# Patient Record
Sex: MALE | Race: WHITE | NOT HISPANIC OR LATINO | Employment: OTHER | URBAN - METROPOLITAN AREA
[De-identification: names, ages, dates, MRNs, and addresses within clinical notes are randomized per-mention and may not be internally consistent; named-entity substitution may affect disease eponyms.]

---

## 2017-05-30 DIAGNOSIS — E78.5 DYSLIPIDEMIA: ICD-10-CM

## 2017-05-30 DIAGNOSIS — I10 ESSENTIAL HYPERTENSION: ICD-10-CM

## 2017-05-30 DIAGNOSIS — I25.10 CORONARY ARTERY DISEASE, ANGINA PRESENCE UNSPECIFIED, UNSPECIFIED VESSEL OR LESION TYPE, UNSPECIFIED WHETHER NATIVE OR TRANSPLANTED HEART: Primary | ICD-10-CM

## 2017-05-30 RX ORDER — EZETIMIBE 10 MG
TABLET ORAL
Qty: 30 TABLET | Refills: 12 | Status: SHIPPED | OUTPATIENT
Start: 2017-05-30 | End: 2017-06-02 | Stop reason: SDUPTHER

## 2017-05-30 NOTE — TELEPHONE ENCOUNTER
----- Message from Poonam Meraz sent at 5/30/2017  9:32 AM CDT -----  Patient spouse is requesting a return call to be advised if patient needs any labs or test prior to his appointment on 06/02/17 contact her at [phone].      Thank you

## 2017-05-30 NOTE — TELEPHONE ENCOUNTER
Spoke with patient's wife. Lab orders entered and pt can come do fasting bloodwork in the morning. She verbalized understanding and thanked us.

## 2017-05-31 ENCOUNTER — LAB VISIT (OUTPATIENT)
Dept: LAB | Facility: HOSPITAL | Age: 67
End: 2017-05-31
Attending: INTERNAL MEDICINE
Payer: MEDICARE

## 2017-05-31 DIAGNOSIS — I25.10 CORONARY ARTERY DISEASE, ANGINA PRESENCE UNSPECIFIED, UNSPECIFIED VESSEL OR LESION TYPE, UNSPECIFIED WHETHER NATIVE OR TRANSPLANTED HEART: ICD-10-CM

## 2017-05-31 DIAGNOSIS — E78.5 DYSLIPIDEMIA: ICD-10-CM

## 2017-05-31 DIAGNOSIS — I10 ESSENTIAL HYPERTENSION: ICD-10-CM

## 2017-05-31 LAB
ALBUMIN SERPL BCP-MCNC: 3.9 G/DL
ALP SERPL-CCNC: 43 U/L
ALT SERPL W/O P-5'-P-CCNC: 35 U/L
ANION GAP SERPL CALC-SCNC: 11 MMOL/L
AST SERPL-CCNC: 29 U/L
BASOPHILS # BLD AUTO: 0.03 K/UL
BASOPHILS NFR BLD: 0.4 %
BILIRUB SERPL-MCNC: 0.6 MG/DL
BUN SERPL-MCNC: 34 MG/DL
CALCIUM SERPL-MCNC: 9.7 MG/DL
CHLORIDE SERPL-SCNC: 106 MMOL/L
CHOLEST/HDLC SERPL: 4.5 {RATIO}
CO2 SERPL-SCNC: 25 MMOL/L
CREAT SERPL-MCNC: 1.5 MG/DL
DIFFERENTIAL METHOD: NORMAL
EOSINOPHIL # BLD AUTO: 0.1 K/UL
EOSINOPHIL NFR BLD: 1.9 %
ERYTHROCYTE [DISTWIDTH] IN BLOOD BY AUTOMATED COUNT: 13.2 %
EST. GFR  (AFRICAN AMERICAN): 54.1 ML/MIN/1.73 M^2
EST. GFR  (NON AFRICAN AMERICAN): 46.8 ML/MIN/1.73 M^2
GLUCOSE SERPL-MCNC: 104 MG/DL
HCT VFR BLD AUTO: 45.2 %
HDL/CHOLESTEROL RATIO: 22.5 %
HDLC SERPL-MCNC: 178 MG/DL
HDLC SERPL-MCNC: 40 MG/DL
HGB BLD-MCNC: 15.4 G/DL
LDLC SERPL CALC-MCNC: 99.8 MG/DL
LYMPHOCYTES # BLD AUTO: 1.7 K/UL
LYMPHOCYTES NFR BLD: 24.2 %
MCH RBC QN AUTO: 30.9 PG
MCHC RBC AUTO-ENTMCNC: 34.1 %
MCV RBC AUTO: 91 FL
MONOCYTES # BLD AUTO: 0.5 K/UL
MONOCYTES NFR BLD: 6.6 %
NEUTROPHILS # BLD AUTO: 4.7 K/UL
NEUTROPHILS NFR BLD: 66.8 %
NONHDLC SERPL-MCNC: 138 MG/DL
PLATELET # BLD AUTO: 202 K/UL
PMV BLD AUTO: 11.8 FL
POTASSIUM SERPL-SCNC: 4.4 MMOL/L
PROT SERPL-MCNC: 7.2 G/DL
RBC # BLD AUTO: 4.98 M/UL
SODIUM SERPL-SCNC: 142 MMOL/L
TRIGL SERPL-MCNC: 191 MG/DL
WBC # BLD AUTO: 7.02 K/UL

## 2017-05-31 PROCEDURE — 85025 COMPLETE CBC W/AUTO DIFF WBC: CPT

## 2017-05-31 PROCEDURE — 80061 LIPID PANEL: CPT

## 2017-05-31 PROCEDURE — 36415 COLL VENOUS BLD VENIPUNCTURE: CPT | Mod: PO

## 2017-05-31 PROCEDURE — 80053 COMPREHEN METABOLIC PANEL: CPT

## 2017-06-02 ENCOUNTER — OFFICE VISIT (OUTPATIENT)
Dept: CARDIOLOGY | Facility: CLINIC | Age: 67
End: 2017-06-02
Payer: MEDICARE

## 2017-06-02 VITALS
HEART RATE: 50 BPM | WEIGHT: 197.31 LBS | HEIGHT: 70 IN | BODY MASS INDEX: 28.25 KG/M2 | DIASTOLIC BLOOD PRESSURE: 80 MMHG | SYSTOLIC BLOOD PRESSURE: 139 MMHG

## 2017-06-02 DIAGNOSIS — I25.10 CORONARY ARTERY DISEASE INVOLVING NATIVE CORONARY ARTERY OF NATIVE HEART WITHOUT ANGINA PECTORIS: Chronic | ICD-10-CM

## 2017-06-02 DIAGNOSIS — R00.1 BRADYCARDIA: Chronic | ICD-10-CM

## 2017-06-02 DIAGNOSIS — I10 ESSENTIAL HYPERTENSION: Chronic | ICD-10-CM

## 2017-06-02 DIAGNOSIS — Z95.5 STATUS POST CORONARY ARTERY STENT PLACEMENT: Primary | Chronic | ICD-10-CM

## 2017-06-02 DIAGNOSIS — E78.5 DYSLIPIDEMIA: ICD-10-CM

## 2017-06-02 PROCEDURE — 99499 UNLISTED E&M SERVICE: CPT | Mod: S$GLB,,, | Performed by: INTERNAL MEDICINE

## 2017-06-02 PROCEDURE — 99214 OFFICE O/P EST MOD 30 MIN: CPT | Mod: S$GLB,,, | Performed by: INTERNAL MEDICINE

## 2017-06-02 PROCEDURE — 1159F MED LIST DOCD IN RCRD: CPT | Mod: S$GLB,,, | Performed by: INTERNAL MEDICINE

## 2017-06-02 PROCEDURE — 1126F AMNT PAIN NOTED NONE PRSNT: CPT | Mod: S$GLB,,, | Performed by: INTERNAL MEDICINE

## 2017-06-02 PROCEDURE — 99999 PR PBB SHADOW E&M-EST. PATIENT-LVL III: CPT | Mod: PBBFAC,,, | Performed by: INTERNAL MEDICINE

## 2017-06-02 RX ORDER — CLOPIDOGREL BISULFATE 75 MG/1
75 TABLET ORAL DAILY
Qty: 90 TABLET | Refills: 5 | Status: SHIPPED | OUTPATIENT
Start: 2017-06-02 | End: 2018-06-29 | Stop reason: SDUPTHER

## 2017-06-02 RX ORDER — PRAVASTATIN SODIUM 20 MG/1
20 TABLET ORAL NIGHTLY
Qty: 90 TABLET | Refills: 4 | Status: SHIPPED | OUTPATIENT
Start: 2017-06-02 | End: 2018-10-25 | Stop reason: SDUPTHER

## 2017-06-02 RX ORDER — AMLODIPINE BESYLATE 5 MG/1
5 TABLET ORAL NIGHTLY
Qty: 90 TABLET | Refills: 4 | Status: SHIPPED | OUTPATIENT
Start: 2017-06-02 | End: 2018-08-02 | Stop reason: SDUPTHER

## 2017-06-02 RX ORDER — EZETIMIBE 10 MG/1
10 TABLET ORAL DAILY
Qty: 90 TABLET | Refills: 5 | Status: SHIPPED | OUTPATIENT
Start: 2017-06-02 | End: 2018-10-25 | Stop reason: SDUPTHER

## 2017-06-02 RX ORDER — LISINOPRIL AND HYDROCHLOROTHIAZIDE 12.5; 2 MG/1; MG/1
1 TABLET ORAL EVERY MORNING
Qty: 90 TABLET | Refills: 5 | Status: SHIPPED | OUTPATIENT
Start: 2017-06-02 | End: 2018-01-26

## 2017-06-02 NOTE — PROGRESS NOTES
Subjective:    Patient ID:  Alfonso Ewing is a 67 y.o. male who presents for follow-up of No chief complaint on file.      HPI Here for follow up of CAD-PCI (2011). Patients states is doing well no chest pain, SOB or change in exertional tolerence. Patient does not exercise but remains very active with out change in exertional tolerance or chest pain.       Review of Systems   Constitution: Negative for decreased appetite and malaise/fatigue.   HENT: Negative for congestion and nosebleeds.    Eyes: Negative for blurred vision.   Cardiovascular: Negative for chest pain, claudication, cyanosis, dyspnea on exertion, irregular heartbeat, leg swelling, near-syncope, orthopnea, palpitations, paroxysmal nocturnal dyspnea and syncope.   Respiratory: Negative for cough and shortness of breath.    Endocrine: Negative for polyuria.   Hematologic/Lymphatic: Does not bruise/bleed easily.   Musculoskeletal: Negative for back pain, falls, joint pain, joint swelling, muscle cramps, muscle weakness and myalgias.   Gastrointestinal: Negative for bloating, abdominal pain, change in bowel habit, nausea and vomiting.   Genitourinary: Negative for urgency.   Neurological: Negative for dizziness, focal weakness and light-headedness.   Psychiatric/Behavioral: Negative for altered mental status.        Objective:    Physical Exam   Constitutional: He is oriented to person, place, and time. He appears well-developed and well-nourished. He is cooperative.   HENT:   Head: Normocephalic and atraumatic.   Eyes: Conjunctivae are normal. Right eye exhibits no exudate. Left eye exhibits no exudate.   Neck: Normal range of motion. Neck supple. Normal carotid pulses and no JVD present. Carotid bruit is not present. No thyromegaly present.   Cardiovascular: Normal rate, regular rhythm, normal heart sounds and intact distal pulses.    Pulses:       Carotid pulses are 2+ on the right side, and 2+ on the left side.       Radial pulses are 2+ on the right  side, and 2+ on the left side.        Dorsalis pedis pulses are 2+ on the right side, and 2+ on the left side.        Posterior tibial pulses are 2+ on the right side, and 2+ on the left side.   Pulmonary/Chest: Effort normal and breath sounds normal.   Abdominal: Soft. Bowel sounds are normal.   Musculoskeletal: Normal range of motion. He exhibits no edema.   Neurological: He is alert and oriented to person, place, and time. Gait normal.   Skin: Skin is warm, dry and intact. No cyanosis. Nails show no clubbing.   Psychiatric: He has a normal mood and affect. His speech is normal and behavior is normal. Judgment and thought content normal.             ..    Chemistry        Component Value Date/Time     05/31/2017 0736    K 4.4 05/31/2017 0736     05/31/2017 0736    CO2 25 05/31/2017 0736    BUN 34 (H) 05/31/2017 0736    CREATININE 1.5 (H) 05/31/2017 0736     05/31/2017 0736        Component Value Date/Time    CALCIUM 9.7 05/31/2017 0736    ALKPHOS 43 (L) 05/31/2017 0736    AST 29 05/31/2017 0736    ALT 35 05/31/2017 0736    BILITOT 0.6 05/31/2017 0736            ..  Lab Results   Component Value Date    CHOL 178 05/31/2017    CHOL 165 10/20/2016    CHOL 164 10/01/2015     Lab Results   Component Value Date    HDL 40 05/31/2017    HDL 36 (L) 10/20/2016    HDL 36 (L) 10/01/2015     Lab Results   Component Value Date    LDLCALC 99.8 05/31/2017    LDLCALC 75.8 10/20/2016    LDLCALC 85.0 10/01/2015     Lab Results   Component Value Date    TRIG 191 (H) 05/31/2017    TRIG 266 (H) 10/20/2016    TRIG 215 (H) 10/01/2015     Lab Results   Component Value Date    CHOLHDL 22.5 05/31/2017    CHOLHDL 21.8 10/20/2016    CHOLHDL 22.0 10/01/2015     ..  Lab Results   Component Value Date    WBC 7.02 05/31/2017    HGB 15.4 05/31/2017    HCT 45.2 05/31/2017    MCV 91 05/31/2017     05/31/2017       Test(s) Reviewed  I have reviewed the following in detail:  [] Stress test   [] Angiography   [] Echocardiogram    [x] Labs   [] Other:       Assessment:         ICD-10-CM ICD-9-CM   1. Status post coronary artery stent placement Z95.5 V45.82   2. Bradycardia R00.1 427.89   3. Dyslipidemia E78.5 272.4   4. Essential hypertension I10 401.9   5. Coronary artery disease involving native coronary artery of native heart without angina pectoris I25.10 414.01     Problem List Items Addressed This Visit     Bradycardia (Chronic)    CAD (coronary artery disease) (Chronic)    Overview     07/11/2011 mid LAD, 80% stenosis; mid LCX, 60% stenosis; mid RCA, 80%        stenosis;                                                                    11/14/2000 LAD, luminal irregularities; LCX, luminal irregularities;         RCA, occluded;                                                               Previous PCI:                                                                S/P coated stent to LAD, 07/29/2011                                          S/P coated stent to RCA, 07/29/2011                                          S/P stent to RCA, 11/14/2000            Dyslipidemia    Overview     hx increased LFT while on niaspan an  vytorin. Myalgia with lipitor, zocor.         Essential hypertension (Chronic)    Status post coronary artery stent placement - Primary (Chronic)    Overview                                          Previous PCI:                                                                S/P coated stent to LAD, 07/29/2011                                          S/P coated stent to RCA, 07/29/2011                                          S/P stent to RCA, 11/14/2000                 Other Visit Diagnoses    None.          Plan:           Return to clinic 9 months   Low level/low impact aerobic exercise 5x's/wk. Heart healthy diet and risk factor modification.    See labs and med orders.  Myalgias imroved statins better not at goal.   TM nuc next visit eval chronotropic response

## 2017-07-13 RX ORDER — NITROGLYCERIN 0.4 MG/1
0.4 TABLET SUBLINGUAL EVERY 5 MIN PRN
Qty: 25 TABLET | Refills: 12 | Status: SHIPPED | OUTPATIENT
Start: 2017-07-13 | End: 2019-08-02 | Stop reason: SDUPTHER

## 2017-07-13 NOTE — TELEPHONE ENCOUNTER
----- Message from Patricia Sanders sent at 7/13/2017  1:46 PM CDT -----  Contact: Crystal SanzSpouse  nitroGLYCERIN (NITROSTAT) 0.4 MG SL tablet  .  [facility] [employer] [id number] [place] [address] & [employer] [address] [place] [id number]  Phone: [phone] Fax: [phone]

## 2017-08-05 RX ORDER — AMLODIPINE BESYLATE 5 MG/1
TABLET ORAL
Qty: 90 TABLET | Refills: 0 | Status: SHIPPED | OUTPATIENT
Start: 2017-08-05 | End: 2017-12-01 | Stop reason: SDUPTHER

## 2017-08-28 RX ORDER — PRAVASTATIN SODIUM 20 MG/1
TABLET ORAL
Qty: 90 TABLET | Refills: 4 | Status: SHIPPED | OUTPATIENT
Start: 2017-08-28 | End: 2017-12-01 | Stop reason: SDUPTHER

## 2017-08-29 RX ORDER — FINASTERIDE 5 MG/1
TABLET, FILM COATED ORAL
Qty: 90 TABLET | Refills: 3 | Status: SHIPPED | OUTPATIENT
Start: 2017-08-29 | End: 2018-09-27 | Stop reason: SDUPTHER

## 2017-09-21 ENCOUNTER — PATIENT OUTREACH (OUTPATIENT)
Dept: ADMINISTRATIVE | Facility: HOSPITAL | Age: 67
End: 2017-09-21

## 2017-09-21 NOTE — LETTER
September 21, 2017    Alfonso Ewing  [address]  [facility] [address]             [facility]  [address]  [facility] [address]  Phone: [phone] Dear  Gildardo:    [facility] is committed to your overall health and would like to ensure that you are up to date on all of your health maintenance testing.  Your insurance company ([employer]) has informed us that you have Dr. Raza listed as your primary care provider.    If Dr. Raza is not your primary care provider, please contact your insurance company and have them update their records with the correct provider.     Otherwise,  Please schedule an appointment with Dr. Raza by calling [phone] at your earliest convenience.    You may also be due for a One-time Hepatitis C Screening lab test(a viral condition that can harm the liver), Tetanus immunization, Shingles immunization, Pneumonia immunization, and Influenza vaccine        If you have any questions or concerns, please don't hesitate to call.    Sincerely,    Michaelle Courtney  Clinical Care Coordinator  [facility] [address] [facility] [address]  [place] [address]  Phone: [phone]   Fax: [phone]

## 2017-09-21 NOTE — PROGRESS NOTES
Jose weiss over 12 month report.  Has Dr. Raza as PCP.  Needs to establish care or notify insurance correct pcp.  Letter mailed.

## 2017-09-29 RX ORDER — CLOPIDOGREL BISULFATE 75 MG/1
TABLET ORAL
Qty: 90 TABLET | Refills: 0 | Status: SHIPPED | OUTPATIENT
Start: 2017-09-29 | End: 2017-12-01 | Stop reason: SDUPTHER

## 2017-10-07 RX ORDER — LISINOPRIL AND HYDROCHLOROTHIAZIDE 12.5; 2 MG/1; MG/1
1 TABLET ORAL EVERY MORNING
Qty: 90 TABLET | Refills: 4 | Status: SHIPPED | OUTPATIENT
Start: 2017-10-07 | End: 2017-12-01 | Stop reason: SDUPTHER

## 2017-10-31 ENCOUNTER — OFFICE VISIT (OUTPATIENT)
Dept: PRIMARY CARE CLINIC | Facility: CLINIC | Age: 67
End: 2017-10-31
Payer: MEDICARE

## 2017-10-31 ENCOUNTER — HOSPITAL ENCOUNTER (OUTPATIENT)
Dept: RADIOLOGY | Facility: HOSPITAL | Age: 67
Discharge: HOME | End: 2017-10-31
Attending: NURSE PRACTITIONER
Payer: MEDICARE

## 2017-10-31 VITALS
HEIGHT: 70 IN | WEIGHT: 197.06 LBS | DIASTOLIC BLOOD PRESSURE: 84 MMHG | SYSTOLIC BLOOD PRESSURE: 136 MMHG | HEART RATE: 85 BPM | TEMPERATURE: 99 F | OXYGEN SATURATION: 97 % | BODY MASS INDEX: 28.21 KG/M2

## 2017-10-31 DIAGNOSIS — M25.561 ACUTE PAIN OF RIGHT KNEE: ICD-10-CM

## 2017-10-31 DIAGNOSIS — T14.8XXA BRUISING: ICD-10-CM

## 2017-10-31 DIAGNOSIS — M25.461 EFFUSION OF RIGHT KNEE: ICD-10-CM

## 2017-10-31 DIAGNOSIS — M25.561 ACUTE PAIN OF RIGHT KNEE: Primary | ICD-10-CM

## 2017-10-31 PROCEDURE — 73562 X-RAY EXAM OF KNEE 3: CPT | Mod: 26,RT,, | Performed by: RADIOLOGY

## 2017-10-31 PROCEDURE — 73560 X-RAY EXAM OF KNEE 1 OR 2: CPT | Mod: 26,59,LT, | Performed by: RADIOLOGY

## 2017-10-31 PROCEDURE — 99214 OFFICE O/P EST MOD 30 MIN: CPT | Mod: S$GLB,,, | Performed by: NURSE PRACTITIONER

## 2017-10-31 PROCEDURE — 73560 X-RAY EXAM OF KNEE 1 OR 2: CPT | Mod: TC,LT,59,PO

## 2017-10-31 PROCEDURE — 99999 PR PBB SHADOW E&M-EST. PATIENT-LVL IV: CPT | Mod: PBBFAC,,, | Performed by: NURSE PRACTITIONER

## 2017-10-31 NOTE — PATIENT INSTRUCTIONS
RICE     Rest an injury, elevate it, and use ice and compression as directed.   RICE stands for rest, ice, compression, and elevation. These can limit pain and swelling after an injury. RICE may be recommended to help treat fractures, sprains, strains, and bruises or bumps.   Home care  The following explain the details of RICE:  · Rest. Limit the use of the injured body part. This helps prevent further damage to the body part and gives it time to heal. In some cases, you may need a sling, brace, splint, or cast to help keep the body part still until it has healed.  · Ice. Applying ice right after an injury helps relieve pain and swelling. Wrap a bag of ice in a thin towel. Then, place it over the injured area. Do this for 10 to 15 minutes every 3 to 4 hours. Continue for the next 1 to 3 days or until your symptoms improve. Never put ice directly on your skin or ice an area longer than 15 minutes at a time.  · Compression. Putting pressure on an injury helps reduce swelling and provides support. Wrap the injured area firmly with an elastic bandage/wrap. Make sure not to wrap the bandage too tightly or you will cut off blood flow to the injured area. If your bandage loosens, rewrap it.  · Elevation. Keeping an injury raised above the level of your heart reduces swelling, pain, and throbbing. For instance, if you have a broken leg, it may help to rest your leg on several pillows when sitting or lying down. Try to keep the injured area elevated for at least 2 to 3 hours per day.  Follow-up care  Follow up with your healthcare provider, or as advised.  When to seek medical advice  Call your healthcare provider right away if any of these occur:  · Fever of 100.4°F (38°C) or higher, or as directed by your healthcare provider  · Increased pain or swelling in the injured body part  · Injured body part becomes cold, blue, numb, or tingly  · Signs of infection. These include warmth in the skin, redness, drainage, or bad  smell coming from the injured body part.  Date Last Reviewed: 1/18/2016  © 2158-0387 [employer]. [address] [place] [address], [place] [address]. All rights reserved. This information is not intended as a substitute for professional medical care. Always follow your healthcare professional's instructions.

## 2017-11-01 NOTE — PROGRESS NOTES
Please let the patient know that there is some fluid in the knee and some soft tissue injury. I put in a referral for ortho and I want him to be seen soon. He also needs to be set up with an appointment with Dr. Raza. Thanks.

## 2017-11-02 ENCOUNTER — OFFICE VISIT (OUTPATIENT)
Dept: ORTHOPEDICS | Facility: CLINIC | Age: 67
End: 2017-11-02
Payer: MEDICARE

## 2017-11-02 VITALS — HEIGHT: 70 IN | WEIGHT: 197 LBS | BODY MASS INDEX: 28.2 KG/M2

## 2017-11-02 DIAGNOSIS — S83.241A ACUTE MEDIAL MENISCUS TEAR OF RIGHT KNEE, INITIAL ENCOUNTER: Primary | ICD-10-CM

## 2017-11-02 PROCEDURE — 99999 PR PBB SHADOW E&M-EST. PATIENT-LVL II: CPT | Mod: PBBFAC,,, | Performed by: ORTHOPAEDIC SURGERY

## 2017-11-02 PROCEDURE — 99203 OFFICE O/P NEW LOW 30 MIN: CPT | Mod: S$GLB,,, | Performed by: ORTHOPAEDIC SURGERY

## 2017-11-02 NOTE — PROGRESS NOTES
DATE: 11/2/2017  PATIENT: Alfonso Ewing  REFERRING MD:  CHIEF COMPLAINT:   Chief Complaint   Patient presents with    Right Knee - Pain       HISTORY:  Alfonso Ewing is a 67 y.o. male  who presents for initial evaluation of right knee injury.  States he was well 4 days ago when he was loading hogs onto a truck.  He developed pain.  He noted swelling and bruising but denies any michael injury.  He had difficulty ambulating.  He states over the last 2 or 3 days the pain started to improve and is been able to weight-bear better.  Pain is rated at 4/10.  He denies any previous injuries to his knee.  He is using a cane for ambulation.      PAST MEDICAL/SURGICAL HISTORY:  Past Medical History:   Diagnosis Date    Allergy     Anticoagulant long-term use     ASA 81 mg, Plavix    BPH (benign prostatic hyperplasia)     BPH (benign prostatic hypertrophy)     Bradycardia     chronic since 2012    Cataract     ou done//    Coronary artery disease     2 stents    Dyspnea on exertion     stable, chronic    Gout     HEARING LOSS     no hearing aides anymore    History of myocardial infarction 2000    Hypertension     Nephrolithiasis 1980    passed it on his own    Otitis media     Sleep apnea     Hx of sleep apnea, does not use CPAP anymore, feels it has improved     Past Surgical History:   Procedure Laterality Date    CARDIAC CATHETERIZATION      2 stents    CATARACT EXTRACTION W/  INTRAOCULAR LENS IMPLANT Right 11/24/2015    By Dr Byrd    CATARACT EXTRACTION W/  INTRAOCULAR LENS IMPLANT Left 1/12/2016    [facility]    COLONOSCOPY      CORONARY STENT PLACEMENT      x 2       Current Medications:   Current Outpatient Prescriptions:     amlodipine (NORVASC) 5 MG tablet, Take 1 tablet (5 mg total) by mouth every evening., Disp: 90 tablet, Rfl: 4    amlodipine (NORVASC) 5 MG tablet, TAKE 1 TABLET(5 MG) BY MOUTH EVERY EVENING, Disp: 90 tablet, Rfl: 0    aspirin (ECOTRIN) 81 MG EC tablet, Take 81 mg by mouth every  evening. , Disp: , Rfl:     clopidogrel (PLAVIX) 75 mg tablet, Take 1 tablet (75 mg total) by mouth once daily., Disp: 90 tablet, Rfl: 5    clopidogrel (PLAVIX) 75 mg tablet, TAKE 1 TABLET BY MOUTH EVERY DAY, Disp: 90 tablet, Rfl: 0    colchicine 0.6 mg tablet, 1 tab now and 1 at bedtime then 1 daily. (Patient taking differently: Take 0.6 mg by mouth daily as needed. ), Disp: 30 tablet, Rfl: 5    ezetimibe (ZETIA) 10 mg tablet, Take 1 tablet (10 mg total) by mouth once daily., Disp: 90 tablet, Rfl: 5    finasteride (PROSCAR) 5 mg tablet, TAKE 1 TABLET BY MOUTH EVERY DAY, Disp: 90 tablet, Rfl: 3    lisinopril-hydrochlorothiazide (PRINZIDE,ZESTORETIC) 20-12.5 mg per tablet, Take 1 tablet by mouth every morning., Disp: 90 tablet, Rfl: 5    lisinopril-hydrochlorothiazide (PRINZIDE,ZESTORETIC) 20-12.5 mg per tablet, TAKE 1 TABLET BY MOUTH EVERY MORNING, Disp: 90 tablet, Rfl: 4    nitroGLYCERIN (NITROSTAT) 0.4 MG SL tablet, Place 1 tablet (0.4 mg total) under the tongue every 5 (five) minutes as needed for Chest pain. 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 Negative for unexplained weight loss.  HENT: Negative for headaches and blurry vision.   Cardiovascular: Negative for chest pain, irregular heartbeat, leg swelling and palpitations.   Respiratory: Negative for cough and shortness of breath.   Gastrointestinal: Negative for abdominal pain, heartburn, nausea and vomiting.   Genitourinary: Negative for bladder incontinence and dysuria.   Musculoskeletal: Negative for systemic arthritis, muscle weakness and myalgias.   Neurological: Negative for numbness.   Psychiatric/Behavioral: Negative for depression.  Endocrine: Negative for polyuria.   Hematologic/Lymphatic: Negative for bleeding disorders.   Skin: Negative for poor wound healing.        PHYSICAL EXAM:  Ht 5' 10" (1.778 m)   Wt 89.4 kg (197 lb)   BMI 28.27 kg/m²   Alfonso Ewing is a well developed, well nourished male in no acute distress. Physical examination of the left knee evaluated the following:    Gait and Alignment  Inspection for ecchymosis, swelling, atrophy, or deformity  Inspection for intra-articular and/or bursal effusions  Tenderness to palpation over the bony and soft tissue structures around the knee  Range of Motion and presence of extensor lag/contractures  Sensation and motor strength  Varus/valgus or anterior/posterior/rotatory instability  Flexion pinch and Huong's Tests  Patellar alignment/tracking/pain to palpation  Vascular exam to include skin temperature/color/capillary refill    Remarkable findings included:  Ecchymosis seen about the medial knee.  Trace effusion.  Range of motion 10° lacking full extension to 120° of flexion.  There is pain at the extreme of extension.  There appears to be a mechanical block.  There is mild medial joint line tenderness.  No lateral tenderness.  No instability on exam.  Flexion pinch is painful.  Huong's test nonspecific.        IMAGING:   The patient's knee radiographs " were personally reviewed and compared to the radiologist's report. No acute fractures or dislocations are seen. The medial and lateral compartments are well preserved without degenerative changes or malalignment. The patellofemoral joint is also without degenerative changes or malalignment. No bony or soft tissue lesions are seen. No loose bodies or calcifications are present.         ASSESSMENT:   Contusion versus medial meniscus tear right knee    PLAN:  The nature of the diagnosis, using models and diagrams when appropriate, was explained to the patient and his wife in detail.  I recommended gentle range of motion exercises and observation versus MRI to rule out a displaced medial meniscus tear.. All questions answered and the patient wishes to observe his symptoms for the next week.  I've recommended gentle exercises.  I will see him back.  Monday for reexamination.  Should he continue to have loss of extension, MRI will be considered to rule out a medial meniscus tear.    This note was dictated using voice recognition software. Please excuse any grammatical or typographical errors.

## 2017-11-02 NOTE — LETTER
November 2, 2017      Smitha Alex NP  [address] [facility] [address] [place] [address]           [facility]  [address] [facility] [address] [place] [address]  Phone: [phone]          Patient: Alfonso Ewing   MR Number: [id number]   YOB: 1950   Date of Visit: 11/2/2017       Dear Smitha Alex:    Thank you for referring Alfonso Ewing to me for evaluation. Attached you will find relevant portions of my assessment and plan of care.    If you have questions, please do not hesitate to call me. I look forward to following Alfonso Ewing along with you.    Sincerely,    Froylan Castle MD    Enclosure  CC:  No Recipients    If you would like to receive this communication electronically, please contact [email or web address] or [phone] to request more information on [employer] Link access.    For providers and/or their staff who would like to refer a patient to [facility], please contact us through our one-stop-shop provider referral line, [facility], at [phone].    If you feel you have received this communication in error or would no longer like to receive these types of communications, please e-mail [email or web address]

## 2017-11-09 ENCOUNTER — LAB VISIT (OUTPATIENT)
Dept: LAB | Facility: HOSPITAL | Age: 67
End: 2017-11-09
Attending: NURSE PRACTITIONER
Payer: MEDICARE

## 2017-11-09 ENCOUNTER — OFFICE VISIT (OUTPATIENT)
Dept: PRIMARY CARE CLINIC | Facility: CLINIC | Age: 67
End: 2017-11-09
Payer: MEDICARE

## 2017-11-09 VITALS
OXYGEN SATURATION: 98 % | SYSTOLIC BLOOD PRESSURE: 138 MMHG | TEMPERATURE: 98 F | BODY MASS INDEX: 28.53 KG/M2 | HEART RATE: 50 BPM | HEIGHT: 70 IN | WEIGHT: 199.31 LBS | DIASTOLIC BLOOD PRESSURE: 88 MMHG

## 2017-11-09 DIAGNOSIS — M79.674 PAIN IN RIGHT TOE(S): Primary | ICD-10-CM

## 2017-11-09 DIAGNOSIS — M79.674 PAIN IN RIGHT TOE(S): ICD-10-CM

## 2017-11-09 LAB — URATE SERPL-MCNC: 7 MG/DL

## 2017-11-09 PROCEDURE — 99999 PR PBB SHADOW E&M-EST. PATIENT-LVL III: CPT | Mod: PBBFAC,,, | Performed by: NURSE PRACTITIONER

## 2017-11-09 PROCEDURE — 36415 COLL VENOUS BLD VENIPUNCTURE: CPT | Mod: PO

## 2017-11-09 PROCEDURE — 84550 ASSAY OF BLOOD/URIC ACID: CPT | Mod: PO

## 2017-11-09 PROCEDURE — 99213 OFFICE O/P EST LOW 20 MIN: CPT | Mod: S$GLB,,, | Performed by: NURSE PRACTITIONER

## 2017-11-09 RX ORDER — COLCHICINE 0.6 MG/1
TABLET ORAL
Qty: 30 TABLET | Refills: 2 | Status: SHIPPED | OUTPATIENT
Start: 2017-11-09 | End: 2022-02-17 | Stop reason: SDUPTHER

## 2017-11-09 NOTE — PATIENT INSTRUCTIONS
Gout    Gout is an inflammation of a joint due to a build-up of gout crystals in the joint fluid. This occurs when there is an excess of uric acid (a normal waste product) in the body. Uric acid builds up in the body when the kidneys are unable to filter enough of it from the blood. This may occur with age. It is also associated with kidney disease. Gout occurs more often in people with obesity, diabetes, high blood pressure, or high levels of fats in the blood. It may run in families. Gout tends to come and go. A flare up of gout is called an attack. Drinking alcohol or eating certain foods (such as shellfish or foods with additives such as high-fructose corn syrup) may increase uric acid levels in the blood and cause a gout attack.  During a gout attack, the affected joint may become a hot, red, swollen and painful. If you have had one attack of gout, you are likely to have another. An attack of gout can be treated with medicine. If these attacks become frequent, a daily medicine may be prescribed to help the kidneys remove uric acid from the body.  Home care  During a gout attack:  · Rest painful joints. If gout affects the joints of your foot or leg, you may want to use crutches for the first few days to keep from bearing weight on the affected joint.  · When sitting or lying down, raise the painful joint to a level higher than your heart.  · Apply an ice pack (ice cubes in a plastic bag wrapped in a thin towel) over the injured area for 20 minutes every 1 to 2 hours the first day for pain relief. Continue this 3 to 4 times a day for swelling and pain.  · Avoid alcohol and foods listed below (see Preventing attacks) during a gout attack. Drink extra fluid to help flush the uric acid through your kidneys.  · If you were prescribed a medicine to treat gout, take it as your healthcare provider has instructed. Don't skip doses.  · Take anti-inflammatory medicine as directed.   · If pain medicines have been  prescribed, take them exactly as directed.    Preventing attacks  · Minimize or avoid alcohol use. Excess alcohol intake can cause a gout attack.  · Limit these foods and beverages:  ¨ Organ meats, such as kidneys and liver  ¨ Certain seafoods (anchovies, sardines, shrimp, scallops, herring, mackerel)  ¨ Wild game, meat extracts and meat gravies  ¨ Foods and beverages sweetened with high-fructose corn syrup, such as sodas  · Eat a healthy diet including low-fat and nonfat dairy, whole grains, and vegetables.  · If you are overweight, talk to your healthcare provider about a weight reduction plan. Avoid fasting or extreme low calorie diets (less than 900 calories per day). This will increase uric acid levels in the body.  · If you have diabetes or high blood pressure, work with your doctor to manage these conditions.  · Protect the joint from injury. Trauma can trigger a gout attack.  Follow-up care  Follow up with your healthcare provider, or as advised.   When to seek medical advice  Call your healthcare provider if you have any of the following:  · Fever over 100.4°F (38.ºC) with worsening joint pain  · Increasing redness around the joint  · Pain developing in another joint  · Repeated vomiting, abdominal pain, or blood in the vomit or stool (black or red color)  Date Last Reviewed: 3/1/2017  © 1647-5170 [employer]. [address] [place] [address], [place] [address]. All rights reserved. This information is not intended as a substitute for professional medical care. Always follow your healthcare professional's instructions.        Treating Gout Attacks     Raising the joint above the level of your heart can help reduce gout symptoms.     Gout is a disease that affects the joints. It is caused by excess uric acid in your blood stream that may lead to crystals forming in your joints. Left untreated, it can lead to painful foot and joint deformities and even kidney problems. But, by treating gout early, you can  relieve pain and help prevent future problems. Gout can usually be treated with medication and proper diet. In severe cases, surgery may be needed.  Gout attacks are painful and often happen more than once. Taking medications may reduce pain and prevent attacks in the future. There are also some things you can do at home to relieve symptoms.  Medications for gout  Your healthcare provider may prescribe a daily medication to reduce levels of uric acid. Reducing your uric acid levels may help prevent gout attacks. Allopurinol is one commonly used medication taken daily to reduce uric acid levels. Other medications can help relieve pain and swelling during an acute attack. Medicines such as NSAIDs (nonsteroidal anti-inflammatory medicines), steroids, and colchicine may be prescribed for intermittent use to relieve an acute gout attack. Be sure to take your medication as directed.  What you can do  Below are some things you can do at home to relieve gout symptoms. Your healthcare provider may have other tips.  · Rest the painful joint as much as you can.  · Raise the painful joint so it is at a level higher than your heart.  · Use ice for 10 minutes every 1-2 hours as possible.  How can I prevent gout?  With a little effort, you may be able to prevent gout attacks in the future. Here are some things you can do:  · Avoid foods high in purines  ¨ Certain meats (red meat, processed meat, turkey)  ¨ Organ meats (kidney, liver, sweetbread)  ¨ Shellfish (lobster, crab, shrimp, scallop, mussel)  ¨ Certain fish (anchovy, sardine, herring, mackerel)  · Take any medications prescribed by your healthcare provider.  · Lose weight if you need to.  · Reduce high fructose corn syrup in meals and drinks.  · Reduce or eliminate consumption of alcohol, particularly beer, but also red wine and spirits.  · Control blood pressure, diabetes, and cholesterol.  · Drink plenty of water to help flush uric acid from your body.  Date Last  Reviewed: 2/1/2016  © 1010-3754 [employer]. [address] [place] [address], [place] [address]. All rights reserved. This information is not intended as a substitute for professional medical care. Always follow your healthcare professional's instructions.      If you are not better in 3 days, if worse or you have concerns or questions, please do not hesitate to call.  You can reach us at [phone] Monday through Friday (except holidays) 12 nooon to 8 p.m.    Thank you for using the [facility]!    JENNIFER Holloway, [facility]-BC  [facility]  [facility]

## 2017-11-09 NOTE — PROGRESS NOTES
Subjective:       Patient ID: Alfonso Ewing is a 67 y.o. male.    Chief Complaint: Foot Pain (right- Tylenol taken once a day)    Patient who is known to me presents with c/o right toe pain. He has had similar symptoms in the past and recently ate some red meat. He tried to use black cherry concentrate to help with symptoms.       Foot Pain   This is a new problem. Episode onset: 4-5 days ago. The problem occurs daily. The problem has been gradually worsening. Associated symptoms include arthralgias and joint swelling. Pertinent negatives include no abdominal pain, chest pain, chills, congestion, coughing, diaphoresis, fatigue, fever, headaches, numbness, rash, sore throat, swollen glands, urinary symptoms, vertigo, visual change, vomiting or weakness. The symptoms are aggravated by standing and walking (touching the toe). He has tried immobilization, position changes, acetaminophen and drinking for the symptoms. The treatment provided mild relief.     Review of Systems   Constitutional: Negative for chills, diaphoresis, fatigue and fever.   HENT: Negative for congestion, ear pain, sinus pressure and sore throat.    Respiratory: Negative for cough, shortness of breath and wheezing.    Cardiovascular: Negative for chest pain and leg swelling.   Gastrointestinal: Negative for abdominal distention, abdominal pain and vomiting.   Genitourinary: Negative for dysuria and flank pain.   Musculoskeletal: Positive for arthralgias, gait problem and joint swelling.   Skin: Positive for color change. Negative for pallor and rash.   Neurological: Negative for vertigo, weakness, light-headedness, numbness and headaches.       Objective:      Physical Exam   Constitutional: He is oriented to person, place, and time. He appears well-developed and well-nourished.   HENT:   Head: Normocephalic and atraumatic.   Right Ear: External ear normal.   Left Ear: External ear normal.   Eyes: Conjunctivae and EOM are normal. Pupils are equal,  round, and reactive to light.   Neck: Normal range of motion. Neck supple.   Cardiovascular: Normal rate and regular rhythm.    Pulmonary/Chest: Effort normal and breath sounds normal.   Abdominal: Soft. Bowel sounds are normal.   Musculoskeletal:        Right foot: There is decreased range of motion, tenderness and swelling. There is normal capillary refill, no crepitus and no laceration.        Feet:    Neurological: He is alert and oriented to person, place, and time.   Skin: Skin is warm and dry.   Nursing note and vitals reviewed.      Assessment:       1. Pain in right toe(s)        Plan:       Pain in right toe(s)  -     URIC ACID; Future; Expected date: 11/23/2017  -     colchicine 0.6 mg tablet; 1 tab now and 1 at bedtime, then 1 tab daily.  Dispense: 30 tablet; Refill: 2    Patient instructed to follow up in 1 week if no improvement in symptoms.

## 2017-11-09 NOTE — PROGRESS NOTES
Please call patient and let him know that he should take the colchicine as the level is on the high end of normal and since the symptoms are similar to his last gout flare. If he is not better in a week after taking the medication he should give my a call. Thanks.

## 2017-12-01 ENCOUNTER — OFFICE VISIT (OUTPATIENT)
Dept: FAMILY MEDICINE | Facility: CLINIC | Age: 67
End: 2017-12-01
Payer: MEDICARE

## 2017-12-01 ENCOUNTER — TELEPHONE (OUTPATIENT)
Dept: CARDIOLOGY | Facility: CLINIC | Age: 67
End: 2017-12-01

## 2017-12-01 VITALS
HEART RATE: 49 BPM | SYSTOLIC BLOOD PRESSURE: 145 MMHG | HEIGHT: 70 IN | DIASTOLIC BLOOD PRESSURE: 79 MMHG | WEIGHT: 193.31 LBS | BODY MASS INDEX: 27.67 KG/M2

## 2017-12-01 DIAGNOSIS — Z00.00 ENCOUNTER FOR PREVENTIVE HEALTH EXAMINATION: ICD-10-CM

## 2017-12-01 DIAGNOSIS — Z95.5 STATUS POST CORONARY ARTERY STENT PLACEMENT: Chronic | ICD-10-CM

## 2017-12-01 DIAGNOSIS — Z12.11 ENCOUNTER FOR SCREENING FOR MALIGNANT NEOPLASM OF COLON: Primary | ICD-10-CM

## 2017-12-01 DIAGNOSIS — I25.10 CORONARY ARTERY DISEASE INVOLVING NATIVE CORONARY ARTERY OF NATIVE HEART WITHOUT ANGINA PECTORIS: Chronic | ICD-10-CM

## 2017-12-01 DIAGNOSIS — E78.5 DYSLIPIDEMIA: ICD-10-CM

## 2017-12-01 DIAGNOSIS — I10 ESSENTIAL HYPERTENSION: Chronic | ICD-10-CM

## 2017-12-01 PROCEDURE — 99999 PR PBB SHADOW E&M-EST. PATIENT-LVL IV: CPT | Mod: PBBFAC,,, | Performed by: NURSE PRACTITIONER

## 2017-12-01 PROCEDURE — 99499 UNLISTED E&M SERVICE: CPT | Mod: S$GLB,,, | Performed by: NURSE PRACTITIONER

## 2017-12-01 PROCEDURE — G0439 PPPS, SUBSEQ VISIT: HCPCS | Mod: S$GLB,,, | Performed by: NURSE PRACTITIONER

## 2017-12-01 PROCEDURE — G0009 ADMIN PNEUMOCOCCAL VACCINE: HCPCS | Mod: S$GLB,,, | Performed by: INTERNAL MEDICINE

## 2017-12-01 PROCEDURE — 90670 PCV13 VACCINE IM: CPT | Mod: S$GLB,,, | Performed by: INTERNAL MEDICINE

## 2017-12-01 NOTE — PATIENT INSTRUCTIONS
Counseling and Referral of Other Preventative  (Italic type indicates deductible and co-insurance are waived)    Patient Name: Alfonso Ewing  Today's Date: 12/1/2017      SERVICE LIMITATIONS RECOMMENDATION    Vaccines    · Pneumococcal (once after 65)    · Influenza (annually)    · Hepatitis B (if medium/high risk)    · Prevnar 13      Hepatitis B medium/high risk factors:       - End-stage renal disease       - Hemophiliacs who received Factor VII or         IX concentrates       - Clients of institutions for the mentally             retarded       - Persons who live in the same house as          a HepB carrier       - Homosexual men       - Illicit injectable drug abusers     Pneumococcal: N/A     Influenza: Recommended to patient, declined     Hepatitis B: N/A     Prevnar 13: Scheduled - see appointments    Prostate cancer screening (annually to age 75)     Prostate specific antigen (PSA) Shared decision making with Provider. Sometimes a co-pay may be required if the patient decides to have this test. The USPSTF no longer recommends prostate cancer screening routinely in medicine: not to follow    Colorectal cancer screening (to age 75)    · Fecal occult blood test (annual)  · Flexible sigmoidoscopy (5y)  · Screening colonoscopy (10y)  · Barium enema   Scheduled, see appointments    Diabetes self-management training (no USPSTF recommendations)  Requires referral by treating physician for patient with diabetes or renal disease. 10 hours of initial DSMT sessions of no less than 30 minutes each in a continuous 12-month period. 2 hours of follow-up DSMT in subsequent years.  N/A    Glaucoma screening (no USPSTF recommendation)  Diabetes mellitus, family history   , age 50 or over    American, age 65 or over  Recommend follow up with eye care professional regularly    Medical nutrition therapy for diabetes or renal disease (no recommended schedule)  Requires referral by treating physician for  patient with diabetes or renal disease or kidney transplant within the past 3 years.  Can be provided in same year as diabetes self-management training (DSMT), and CMS recommends medical nutrition therapy take place after DSMT. Up to 3 hours for initial year and 2 hours in subsequent years.  N/A    Cardiovascular screening blood tests (every 5 years)  · Fasting lipid panel  Order as a panel if possible  Done this year, repeat every year    Diabetes screening tests (at least every 3 years, Medicare covers annually or at 6-month intervals for prediabetic patients)  · Fasting blood sugar (FBS) or glucose tolerance test (GTT)  Patient must be diagnosed with one of the following:       - Hypertension       - Dyslipidemia       - Obesity (BMI 30kg/m2)       - Previous elevated impaired FBS or GTT       ... or any two of the following:       - Overweight (BMI 25 but <30)       - Family history of diabetes       - Age 65 or older       - History of gestational diabetes or birth of baby weighing more than 9 pounds  Done this year, repeat every year    Abdominal aortic aneurysm screening (once)  · Sonogram   Limited to patients who meet one of the following criteria:       - Men who are 65-75 years old and have smoked more than 100 cigarette in their lifetime       - Anyone with a family history of abdominal aortic aneurysm       - Anyone recommended for screening by the USPSTF  N/A    HIV screening (annually for increased risk patients)  · HIV-1 and HIV-2 by EIA, or SHANDRA, rapid antibody test or oral mucosa transudate  Patients must be at increased risk for HIV infection per USPSTF guidelines or pregnant. Tests covered annually for patient at increased risk or as requested by the patient. Pregnant patients may receive up to 3 tests during pregnancy.  Risks discussed, screening is not recommended    Smoking cessation counseling (up to 8 sessions per year)  Patients must be asymptomatic of tobacco-related conditions to receive  as a preventative service.  Non-smoker    Subsequent annual wellness visit  At least 12 months since last AWV  Return in one year     The following information is provided to all patients.  This information is to help you find resources for any of the problems found today that may be affecting your health:                Living healthy guide: www.[facility].[place].[facility]      Understanding Diabetes: www.diabetes.org      Eating healthy: www.cdc.gov/healthyweight      CDC home safety checklist: www.cdc.gov/steadi/patient.html      Agency on Aging: www.goea.[place].[facility]      Alcoholics anonymous (AA): www.aa.org      Physical Activity: www.kathya.nih.gov/yl9yesf      Tobacco use: www.quitwithusla.org

## 2017-12-01 NOTE — TELEPHONE ENCOUNTER
----- Message from Vonda Vail sent at 12/1/2017  1:45 PM CST -----  Contact: patient  He said he is to schedule a follow up appointment with Dr Borrero in February, 2018 and labs also. I also see a NM stress test, and lab work. I did not know if these are to be done prior to his seeing Dr Borrero or if they have been done already. Please advise the patient about these appointments.

## 2017-12-11 NOTE — PROGRESS NOTES
"Alfonso Ewing presented for a  Medicare AWV and comprehensive Health Risk Assessment today. The following components were reviewed and updated:    · Medical history  · Family History  · Social history  · Allergies and Current Medications  · Health Risk Assessment  · Health Maintenance  · Care Team     ** See Completed Assessments for Annual Wellness Visit within the encounter summary.**       The following assessments were completed:  · Living Situation  · CAGE  · Depression Screening  · Timed Get Up and Go  · Whisper Test  · Cognitive Function Screening          · Nutrition Screening  · ADL Screening  · PAQ Screening    Vitals:    12/01/17 1249   BP: (!) 145/79   BP Location: Left arm   Patient Position: Sitting   BP Method: Medium (Automatic)   Pulse: (!) 49   Weight: 87.7 kg (193 lb 5.5 oz)   Height: 5' 10" (1.778 m)     Body mass index is 27.74 kg/m².  Physical Exam   Constitutional: He is oriented to person, place, and time. He appears well-developed and well-nourished. No distress.   HENT:   Head: Normocephalic and atraumatic.   Right Ear: Decreased hearing is noted.   Left Ear: Decreased hearing is noted.   Neck: Carotid bruit is not present. No tracheal deviation present.   Cardiovascular: Normal rate, regular rhythm and intact distal pulses.    No murmur heard.  Pulmonary/Chest: Effort normal and breath sounds normal. No respiratory distress. He has no wheezes.   Lymphadenopathy:     He has no cervical adenopathy.   Neurological: He is alert and oriented to person, place, and time.   Skin: Skin is warm and dry.   Psychiatric: He has a normal mood and affect. His behavior is normal. Judgment and thought content normal.         Diagnoses and health risks identified today and associated recommendations/orders:    1. Encounter for screening for malignant neoplasm of colon  Reviewed health maintenance and provided recommendations    Written rx for PCV13  Recommend est care with Ry  - Case request GI: " COLONOSCOPY    2. Encounter for preventive health examination    - Hepatitis C antibody; Future    3. Coronary artery disease involving native coronary artery of native heart without angina pectoris  Stable.   No CP  Followed by Gissell.       4. Dyslipidemia  Stable.   Taking statin  Followed by gissell.       5. Essential hypertension  Stable.   Controlled on current medications.  Followed by gissell.       6. Status post coronary artery stent placement  Stable.   No CP  Followed by gissell.         Provided Alfonso with a 5-10 year written screening schedule and personal prevention plan. Recommendations were developed using the USPSTF age appropriate recommendations. Education, counseling, and referrals were provided as needed. After Visit Summary printed and given to patient which includes a list of additional screenings\tests needed.    Return in about 1 year (around 12/1/2018).    Kiara Joseph NP

## 2018-01-04 ENCOUNTER — TELEPHONE (OUTPATIENT)
Dept: CARDIOLOGY | Facility: CLINIC | Age: 68
End: 2018-01-04

## 2018-01-04 NOTE — TELEPHONE ENCOUNTER
Spoke to pt. Wife, stated they need a prescription for the generic brand of Zetia. Advise her to request for the generic brand of the Zetia at the pharmacy's upon getting a refill and if there is a problem give us a call back. Pt. Wife verbalized understanding.

## 2018-01-04 NOTE — TELEPHONE ENCOUNTER
----- Message from Santy Mccabe sent at 1/4/2018 11:45 AM CST -----  Contact: Wife, Crystal Rojas want to know if you can write a rx for Ezetimibe if so please send to [facility], any questions please call back at [phone] (home)     [facility] [address] [place] [address] & [address] [address]  Phone: [phone] Fax: [phone]

## 2018-01-24 ENCOUNTER — HOSPITAL ENCOUNTER (OUTPATIENT)
Dept: RADIOLOGY | Facility: HOSPITAL | Age: 68
Discharge: HOME | End: 2018-01-24
Attending: INTERNAL MEDICINE
Payer: MEDICARE

## 2018-01-24 ENCOUNTER — CLINICAL SUPPORT (OUTPATIENT)
Dept: CARDIOLOGY | Facility: CLINIC | Age: 68
End: 2018-01-24
Attending: INTERNAL MEDICINE
Payer: MEDICARE

## 2018-01-24 DIAGNOSIS — Z95.5 STATUS POST CORONARY ARTERY STENT PLACEMENT: Chronic | ICD-10-CM

## 2018-01-24 DIAGNOSIS — I10 ESSENTIAL HYPERTENSION: Chronic | ICD-10-CM

## 2018-01-24 DIAGNOSIS — E78.5 DYSLIPIDEMIA: ICD-10-CM

## 2018-01-24 DIAGNOSIS — R00.1 BRADYCARDIA: Chronic | ICD-10-CM

## 2018-01-24 PROCEDURE — 78452 HT MUSCLE IMAGE SPECT MULT: CPT | Mod: 26,,, | Performed by: INTERNAL MEDICINE

## 2018-01-24 PROCEDURE — 93015 CV STRESS TEST SUPVJ I&R: CPT | Mod: S$GLB,,, | Performed by: INTERNAL MEDICINE

## 2018-01-24 PROCEDURE — A9502 TC99M TETROFOSMIN: HCPCS | Mod: PO

## 2018-01-25 LAB — DIASTOLIC DYSFUNCTION: NO

## 2018-01-26 ENCOUNTER — OFFICE VISIT (OUTPATIENT)
Dept: CARDIOLOGY | Facility: CLINIC | Age: 68
End: 2018-01-26
Payer: MEDICARE

## 2018-01-26 VITALS
HEART RATE: 52 BPM | SYSTOLIC BLOOD PRESSURE: 164 MMHG | WEIGHT: 192.88 LBS | BODY MASS INDEX: 27.61 KG/M2 | DIASTOLIC BLOOD PRESSURE: 79 MMHG | HEIGHT: 70 IN

## 2018-01-26 DIAGNOSIS — E78.5 DYSLIPIDEMIA: ICD-10-CM

## 2018-01-26 DIAGNOSIS — R94.39 ABNORMAL THALLIUM STRESS TEST: ICD-10-CM

## 2018-01-26 DIAGNOSIS — R00.1 BRADYCARDIA: Chronic | ICD-10-CM

## 2018-01-26 DIAGNOSIS — I25.10 CORONARY ARTERY DISEASE INVOLVING NATIVE CORONARY ARTERY OF NATIVE HEART WITHOUT ANGINA PECTORIS: Chronic | ICD-10-CM

## 2018-01-26 DIAGNOSIS — I10 ESSENTIAL HYPERTENSION: Chronic | ICD-10-CM

## 2018-01-26 DIAGNOSIS — Z95.5 STATUS POST CORONARY ARTERY STENT PLACEMENT: Primary | Chronic | ICD-10-CM

## 2018-01-26 PROCEDURE — 99999 PR PBB SHADOW E&M-EST. PATIENT-LVL III: CPT | Mod: PBBFAC,,, | Performed by: INTERNAL MEDICINE

## 2018-01-26 PROCEDURE — 99213 OFFICE O/P EST LOW 20 MIN: CPT | Mod: S$GLB,,, | Performed by: INTERNAL MEDICINE

## 2018-01-26 PROCEDURE — 99499 UNLISTED E&M SERVICE: CPT | Mod: S$GLB,,, | Performed by: INTERNAL MEDICINE

## 2018-01-26 RX ORDER — VALSARTAN AND HYDROCHLOROTHIAZIDE 160; 12.5 MG/1; MG/1
1 TABLET, FILM COATED ORAL DAILY
Qty: 90 TABLET | Refills: 3 | Status: SHIPPED | OUTPATIENT
Start: 2018-01-26 | End: 2018-10-25 | Stop reason: SDUPTHER

## 2018-01-26 RX ORDER — PNEUMOCOCCAL 13-VALENT CONJUGATE VACCINE 2.2; 2.2; 2.2; 2.2; 2.2; 4.4; 2.2; 2.2; 2.2; 2.2; 2.2; 2.2; 2.2 UG/.5ML; UG/.5ML; UG/.5ML; UG/.5ML; UG/.5ML; UG/.5ML; UG/.5ML; UG/.5ML; UG/.5ML; UG/.5ML; UG/.5ML; UG/.5ML; UG/.5ML
INJECTION, SUSPENSION INTRAMUSCULAR
COMMUNITY
Start: 2017-12-01 | End: 2018-08-08

## 2018-01-26 NOTE — PROGRESS NOTES
Subjective:    Patient ID:  Alfonso Ewing is a 67 y.o. male who presents for follow-up of No chief complaint on file.      HPI  Here for follow up of CAD-PCI(2011) returns due to abnl stress test. Active man, no CP no change in exertional tolereance.    Review of Systems   Constitution: Negative for malaise/fatigue.   Eyes: Negative for blurred vision.   Cardiovascular: Negative for chest pain, claudication, cyanosis, dyspnea on exertion, irregular heartbeat, leg swelling, near-syncope, orthopnea, palpitations, paroxysmal nocturnal dyspnea and syncope.   Respiratory: Negative for cough and shortness of breath.    Hematologic/Lymphatic: Does not bruise/bleed easily.   Musculoskeletal: Negative for back pain, falls, joint pain, muscle cramps, muscle weakness and myalgias.   Gastrointestinal: Negative for abdominal pain, change in bowel habit, nausea and vomiting.   Genitourinary: Negative for urgency.   Neurological: Negative for dizziness, focal weakness and light-headedness.        Objective:    Physical Exam   Constitutional: He is oriented to person, place, and time. He appears well-developed and well-nourished.   Neck: Normal range of motion. Neck supple. No JVD present.   Cardiovascular: Normal rate, regular rhythm, normal heart sounds and intact distal pulses.    Pulses:       Carotid pulses are 2+ on the right side, and 2+ on the left side.       Radial pulses are 2+ on the right side, and 2+ on the left side.   Pulmonary/Chest: Effort normal and breath sounds normal.   Musculoskeletal: Normal range of motion. He exhibits no edema.   Neurological: He is alert and oriented to person, place, and time.   Skin: Skin is warm and dry.   Psychiatric: He has a normal mood and affect. His speech is normal. Cognition and memory are normal.   Nursing note and vitals reviewed.            ..    Chemistry        Component Value Date/Time     01/24/2018 0836    K 4.1 01/24/2018 0836     01/24/2018 0836    CO2 27  01/24/2018 0836    BUN 23 01/24/2018 0836    CREATININE 1.3 01/24/2018 0836     01/24/2018 0836        Component Value Date/Time    CALCIUM 9.7 01/24/2018 0836    ALKPHOS 45 (L) 01/24/2018 0836    AST 31 01/24/2018 0836    ALT 45 (H) 01/24/2018 0836    BILITOT 0.9 01/24/2018 0836    ESTGFRAFRICA >60.0 01/24/2018 0836    EGFRNONAA 55.7 (A) 01/24/2018 0836            ..  Lab Results   Component Value Date    CHOL 153 01/24/2018    CHOL 178 05/31/2017    CHOL 165 10/20/2016     Lab Results   Component Value Date    HDL 38 (L) 01/24/2018    HDL 40 05/31/2017    HDL 36 (L) 10/20/2016     Lab Results   Component Value Date    LDLCALC 88.4 01/24/2018    LDLCALC 99.8 05/31/2017    LDLCALC 75.8 10/20/2016     Lab Results   Component Value Date    TRIG 133 01/24/2018    TRIG 191 (H) 05/31/2017    TRIG 266 (H) 10/20/2016     Lab Results   Component Value Date    CHOLHDL 24.8 01/24/2018    CHOLHDL 22.5 05/31/2017    CHOLHDL 21.8 10/20/2016     ..  Lab Results   Component Value Date    WBC 7.02 05/31/2017    HGB 15.4 05/31/2017    HCT 45.2 05/31/2017    MCV 91 05/31/2017     05/31/2017       Test(s) Reviewed  I have reviewed the following in detail:  [] Stress test   [] Angiography   [] Echocardiogram   [x] Labs   [x] Other:       Assessment:         ICD-10-CM ICD-9-CM   1. Status post coronary artery stent placement Z95.5 V45.82   2. Bradycardia R00.1 427.89   3. Dyslipidemia E78.5 272.4   4. Essential hypertension I10 401.9   5. Coronary artery disease involving native coronary artery of native heart without angina pectoris I25.10 414.01     Problem List Items Addressed This Visit     Bradycardia (Chronic)    CAD (coronary artery disease) (Chronic)    Overview     07/11/2011 mid LAD, 80% stenosis; mid LCX, 60% stenosis; mid RCA, 80%        stenosis;                                                                    11/14/2000 LAD, luminal irregularities; LCX, luminal irregularities;         RCA, occluded;                                                                         Dyslipidemia    Overview     hx increased LFT while on niaspan an  vytorin. Myalgia with lipitor, zocor.         Essential hypertension (Chronic)    Status post coronary artery stent placement - Primary (Chronic)    Overview                                          Previous PCI:                                                                S/P coated stent to LAD, 07/29/2011                                          S/P coated stent to RCA, 07/29/2011                                          S/P stent to RCA, 11/14/2000                      Plan:           Return to clinic 8 months   Low level/low impact aerobic exercise 5x's/wk. Heart healthy diet and risk factor modification.    See labs and med orders.  If develops sx's will do LHC. Pt started riding stationary bike this week w/o sx's continue.

## 2018-02-19 ENCOUNTER — OFFICE VISIT (OUTPATIENT)
Dept: FAMILY MEDICINE | Facility: CLINIC | Age: 68
End: 2018-02-19
Payer: MEDICARE

## 2018-02-19 VITALS
WEIGHT: 195.13 LBS | OXYGEN SATURATION: 96 % | HEART RATE: 96 BPM | HEIGHT: 70 IN | DIASTOLIC BLOOD PRESSURE: 68 MMHG | BODY MASS INDEX: 27.94 KG/M2 | RESPIRATION RATE: 14 BRPM | SYSTOLIC BLOOD PRESSURE: 120 MMHG

## 2018-02-19 DIAGNOSIS — E78.5 DYSLIPIDEMIA: ICD-10-CM

## 2018-02-19 DIAGNOSIS — I25.10 CORONARY ARTERY DISEASE, ANGINA PRESENCE UNSPECIFIED, UNSPECIFIED VESSEL OR LESION TYPE, UNSPECIFIED WHETHER NATIVE OR TRANSPLANTED HEART: ICD-10-CM

## 2018-02-19 DIAGNOSIS — L98.9 SKIN LESION: ICD-10-CM

## 2018-02-19 DIAGNOSIS — I10 ESSENTIAL HYPERTENSION: ICD-10-CM

## 2018-02-19 DIAGNOSIS — Z00.00 ROUTINE PHYSICAL EXAMINATION: Primary | ICD-10-CM

## 2018-02-19 DIAGNOSIS — M10.9 GOUT, UNSPECIFIED CAUSE, UNSPECIFIED CHRONICITY, UNSPECIFIED SITE: ICD-10-CM

## 2018-02-19 PROCEDURE — 99499 UNLISTED E&M SERVICE: CPT | Mod: S$GLB,,, | Performed by: INTERNAL MEDICINE

## 2018-02-19 PROCEDURE — 99999 PR PBB SHADOW E&M-EST. PATIENT-LVL III: CPT | Mod: PBBFAC,,, | Performed by: INTERNAL MEDICINE

## 2018-02-19 PROCEDURE — 99397 PER PM REEVAL EST PAT 65+ YR: CPT | Mod: S$GLB,,, | Performed by: INTERNAL MEDICINE

## 2018-02-19 RX ORDER — LISINOPRIL AND HYDROCHLOROTHIAZIDE 12.5; 2 MG/1; MG/1
TABLET ORAL
Refills: 4 | COMMUNITY
Start: 2018-01-06 | End: 2018-08-08

## 2018-02-19 NOTE — PROGRESS NOTES
Subjective:       Patient ID: Alfonso Ewing is a 67 y.o. male.    Chief Complaint: Annual Exam    Here for routine health maintenance.    Hard of hearing.  Quit hearing aids.   Gout - rare attacks.  Uric acid mildly high.  Controlled with prn colchicine - CAN.   CAD s/p MI s/p stents x2.  No chest pain.  Dr Borrero  HTN - controlled.  Will start valsartan HCTZ 160/12.5 shortly.   HLD - mildly uncontrolled goal < 70 LDL on Zetia (CAN) and 20 mg pravastatin.  Previous high dose statin caused muscle pain.   BPH - controlled          Review of Systems   Constitutional: Negative for appetite change and fever.   HENT: Negative for nosebleeds and trouble swallowing.    Eyes: Negative for discharge and visual disturbance.   Respiratory: Negative for choking and shortness of breath.    Cardiovascular: Negative for chest pain and palpitations.   Gastrointestinal: Negative for abdominal pain, nausea and vomiting.   Musculoskeletal: Negative for arthralgias and joint swelling.   Skin: Negative for rash and wound.   Neurological: Negative for dizziness and syncope.   Psychiatric/Behavioral: Negative for confusion and dysphoric mood.       Objective:      Vitals:    02/19/18 1651   BP: 120/68   Pulse: 96   Resp: 14     Physical Exam   Constitutional: He appears well-nourished.   Eyes: Conjunctivae and EOM are normal.   Neck: Trachea normal and normal range of motion. No thyromegaly present.   Cardiovascular: Normal heart sounds.    Edema negative   Pulmonary/Chest: Effort normal and breath sounds normal.   Abdominal: Soft. There is no hepatomegaly.   Neurological: No cranial nerve deficit.   DTR decreased bilateral   Skin: Skin is warm, dry and intact.   Psychiatric: He has a normal mood and affect.   Alert and Oriented    Vitals reviewed.        Assessment:       1. Routine physical examination    2. Essential hypertension    3. Dyslipidemia    4. Coronary artery disease, angina presence unspecified, unspecified vessel or lesion  "type, unspecified whether native or transplanted heart    5. Gout, unspecified cause, unspecified chronicity, unspecified site        Plan:       Routine physical examination    Essential hypertension    Dyslipidemia    Coronary artery disease, angina presence unspecified, unspecified vessel or lesion type, unspecified whether native or transplanted heart    Gout, unspecified cause, unspecified chronicity, unspecified site    Skin lesion  -     Ambulatory referral to Dermatology    wellness reviewed  Colchicine rx given  Will see card in 6 mo        Counseled on regular exercise, maintenance of a healthy weight, balanced diet rich in fruits/vegetables and lean protein, and avoidance of unhealthy habits like smoking and excessive alcohol intake.   Also, counseled on importance of being compliant with medication, health appointments, diet and exercise.     Follow-up in about 1 year (around 2/19/2019).    "This note will not be shared with the patient."  "

## 2018-03-19 ENCOUNTER — INITIAL CONSULT (OUTPATIENT)
Dept: DERMATOLOGY | Facility: CLINIC | Age: 68
End: 2018-03-19
Payer: MEDICARE

## 2018-03-19 VITALS
RESPIRATION RATE: 16 BRPM | WEIGHT: 195 LBS | BODY MASS INDEX: 27.92 KG/M2 | HEIGHT: 70 IN | HEART RATE: 44 BPM | DIASTOLIC BLOOD PRESSURE: 78 MMHG | SYSTOLIC BLOOD PRESSURE: 149 MMHG

## 2018-03-19 DIAGNOSIS — D48.5 NEOPLASM OF UNCERTAIN BEHAVIOR OF SKIN: ICD-10-CM

## 2018-03-19 DIAGNOSIS — L81.4 LENTIGINES: ICD-10-CM

## 2018-03-19 DIAGNOSIS — L57.0 ACTINIC KERATOSIS: ICD-10-CM

## 2018-03-19 DIAGNOSIS — L28.1 PRURIGO NODULARIS: ICD-10-CM

## 2018-03-19 DIAGNOSIS — L82.1 SEBORRHEIC KERATOSES: Primary | ICD-10-CM

## 2018-03-19 PROCEDURE — 11100 PR BIOPSY OF SKIN LESION: CPT | Mod: 59,S$GLB,, | Performed by: DERMATOLOGY

## 2018-03-19 PROCEDURE — 88305 TISSUE EXAM BY PATHOLOGIST: CPT | Performed by: PATHOLOGY

## 2018-03-19 PROCEDURE — 99202 OFFICE O/P NEW SF 15 MIN: CPT | Mod: 25,S$GLB,, | Performed by: DERMATOLOGY

## 2018-03-19 PROCEDURE — 17000 DESTRUCT PREMALG LESION: CPT | Mod: S$GLB,,, | Performed by: DERMATOLOGY

## 2018-03-19 PROCEDURE — 3074F SYST BP LT 130 MM HG: CPT | Mod: CPTII,S$GLB,, | Performed by: DERMATOLOGY

## 2018-03-19 PROCEDURE — 99999 PR PBB SHADOW E&M-EST. PATIENT-LVL IV: CPT | Mod: PBBFAC,,, | Performed by: DERMATOLOGY

## 2018-03-19 PROCEDURE — 3078F DIAST BP <80 MM HG: CPT | Mod: CPTII,S$GLB,, | Performed by: DERMATOLOGY

## 2018-03-19 RX ORDER — TRIAMCINOLONE ACETONIDE 1 MG/G
CREAM TOPICAL 2 TIMES DAILY
Qty: 45 G | Refills: 0 | Status: SHIPPED | OUTPATIENT
Start: 2018-03-19 | End: 2019-12-27

## 2018-03-19 NOTE — PROGRESS NOTES
Subjective:       Patient ID:  Alfonso Ewing is a 67 y.o. male who presents for   Chief Complaint   Patient presents with    Lesion     R ear , arms , L wrist & R lower lip     Initial visit for skin lesion per Dr Adrian Raza .  Lesions to bi-lat arms . Slightly raised , dry & scaly . Never treated   Lesion to top of R ear . Slightly raised , dry & scaly   Lesion to R lower lip, a few years . Dark in color . Never drains . Never treated   Lesion over R  eyebrow Slightly raised - years .Never treated   Lesion R wrist , few years . Raised & red . Never treated     High exposure to sun . Worked outdoors , fished & gardened   No phx skin ca   No known fhx skin ca       Past Medical History:   Diagnosis Date    Allergy     Anticoagulant long-term use     ASA 81 mg, Plavix    BPH (benign prostatic hyperplasia)     BPH (benign prostatic hypertrophy)     Bradycardia     chronic since 2012    Cataract     ou done//    Coronary artery disease     2 stents    Dyspnea on exertion     stable, chronic    Gout     HEARING LOSS     no hearing aides anymore    History of myocardial infarction 2000    Hypertension     Nephrolithiasis 1980    passed it on his own    Otitis media     Sleep apnea     Hx of sleep apnea, does not use CPAP anymore, feels it has improved     Review of Systems   Skin: Positive for dry skin.   Hematologic/Lymphatic: Bruises/bleeds easily.        Objective:    Physical Exam   Skin:   Areas Examined (abnormalities noted in diagram):   Scalp / Hair Palpated and Inspected  Head / Face Inspection Performed  Neck Inspection Performed  Chest / Axilla Inspection Performed  Abdomen Inspection Performed  Back Inspection Performed  RUE Inspected  LUE Inspection Performed                       Diagram Legend     Erythematous scaling macule/papule c/w actinic keratosis       Vascular papule c/w angioma      Pigmented verrucoid papule/plaque c/w seborrheic keratosis      Yellow umbilicated papule c/w  sebaceous hyperplasia      Irregularly shaped tan macule c/w lentigo     1-2 mm smooth white papules consistent with Milia      Movable subcutaneous cyst with punctum c/w epidermal inclusion cyst      Subcutaneous movable cyst c/w pilar cyst      Firm pink to brown papule c/w dermatofibroma      Pedunculated fleshy papule(s) c/w skin tag(s)      Evenly pigmented macule c/w junctional nevus     Mildly variegated pigmented, slightly irregular-bordered macule c/w mildly atypical nevus      Flesh colored to evenly pigmented papule c/w intradermal nevus       Pink pearly papule/plaque c/w basal cell carcinoma      Erythematous hyperkeratotic cursted plaque c/w SCC      Surgical scar with no sign of skin cancer recurrence      Open and closed comedones      Inflammatory papules and pustules      Verrucoid papule consistent consistent with wart     Erythematous eczematous patches and plaques     Dystrophic onycholytic nail with subungual debris c/w onychomycosis     Umbilicated papule    Erythematous-base heme-crusted tan verrucoid plaque consistent with inflamed seborrheic keratosis     Erythematous Silvery Scaling Plaque c/w Psoriasis     See annotation      Assessment / Plan:      Pathology Orders:     Normal Orders This Visit    Tissue Specimen To Pathology, Dermatology     Questions:    Directional Terms:  Other(comment)    Clinical information:  r/o SCC vs BCC vs other    Specific Site:  L dorsal wrist        Seborrheic keratoses  These are benign inherited growths without a malignant potential. Reassurance given to patient. No treatment is necessary.     Actinic keratosis  Cryosurgery Procedure Note    Verbal consent from the patient is obtained and the patient is aware of the precancerous quality and need for treatment of these lesions. Liquid nitrogen cryosurgery is applied to the 1 actinic keratosis, as detailed in the physical exam, to produce a freeze injury. The patient is aware that blisters may form and is  instructed on wound care with gentle cleansing and use of vaseline ointment to keep moist until healed. The patient is supplied a handout on cryosurgery and is instructed to call if lesions do not completely resolve.    Neoplasm of uncertain behavior of skin  -     Tissue Specimen To Pathology, Dermatology  Shave biopsy procedure note:    Shave biopsy performed after verbal consent including risk of infection, scar, recurrence, need for additional treatment of site. Area prepped with alcohol, anesthetized with approximately 1.0cc of 1% lidocaine with epinephrine. Lesional tissue shaved with razor blade. Hemostasis achieved with application of aluminum chloride followed by hyfrecation. No complications. Dressing applied. Wound care explained.      Prurigo nodularis  -     triamcinolone acetonide 0.1% (KENALOG) 0.1 % cream; Apply topically 2 (two) times daily.  Dispense: 45 g; Refill: 0  Encouraged patient to stop scratching/rubbing as this can exacerbate the condition    Lentigines  These are benign hyperpigmented sun induced lesions. Daily sun protection will reduce the number of new lesions    **Pt had vasovagal episode after biopsy.  Bradycardic at baseline (40s-50s).  BP stable.  Pt felt better after cool compresses and legs elevated. Pt left ambulatory with his wife.  Declined wheelchair**         Follow-up for pending Pathology.

## 2018-03-19 NOTE — LETTER
March 19, 2018      Adrian Raza MD  [address] [facility] [address] [place] [address]           [facility]  [address] [facility] [address] [place] [address]  Phone: [phone]  Fax: [phone]          Patient: Alfonso Ewing   MR Number: [id number]   YOB: 1950   Date of Visit: 3/19/2018       Dear Dr. Adrian Raza:    Thank you for referring Alfonso Ewing to me for evaluation. Attached you will find relevant portions of my assessment and plan of care.    If you have questions, please do not hesitate to call me. I look forward to following Alfonso Ewing along with you.    Sincerely,    Desirae Bolden MD    Enclosure  CC:  No Recipients    If you would like to receive this communication electronically, please contact [email or web address] or [phone] to request more information on [employer] Mobile App Link access.    For providers and/or their staff who would like to refer a patient to [facility], please contact us through our one-stop-shop provider referral line, [facility], at [phone].    If you feel you have received this communication in error or would no longer like to receive these types of communications, please e-mail [email or web address]

## 2018-06-29 DIAGNOSIS — I10 ESSENTIAL HYPERTENSION: Chronic | ICD-10-CM

## 2018-06-29 DIAGNOSIS — R00.1 BRADYCARDIA: Chronic | ICD-10-CM

## 2018-06-29 DIAGNOSIS — Z95.5 STATUS POST CORONARY ARTERY STENT PLACEMENT: Chronic | ICD-10-CM

## 2018-06-29 DIAGNOSIS — E78.5 DYSLIPIDEMIA: ICD-10-CM

## 2018-06-29 RX ORDER — CLOPIDOGREL BISULFATE 75 MG/1
TABLET ORAL
Qty: 90 TABLET | Refills: 4 | Status: SHIPPED | OUTPATIENT
Start: 2018-06-29 | End: 2018-10-25 | Stop reason: SDUPTHER

## 2018-07-05 ENCOUNTER — PES CALL (OUTPATIENT)
Dept: ADMINISTRATIVE | Facility: CLINIC | Age: 68
End: 2018-07-05

## 2018-07-25 ENCOUNTER — TELEPHONE (OUTPATIENT)
Dept: UROLOGY | Facility: CLINIC | Age: 68
End: 2018-07-25

## 2018-07-25 DIAGNOSIS — R97.20 ELEVATED PSA: Primary | ICD-10-CM

## 2018-07-25 NOTE — TELEPHONE ENCOUNTER
----- Message from Ning Berry sent at 7/25/2018 12:25 PM CDT -----  Contact: pt wife  Type: Needs Medical Advice    Who Called:  Crystaledison Ewing   Symptoms (please be specific):  n/a  How long has patient had these symptoms:  n/a  Pharmacy name and phone #:   n/a  Best Call Back Number:     Additional Information:  Pt wife is calling to see if pt would need blood work prior to his appt for an annual check up . Please call back and advise or email.    [email or web address]

## 2018-08-02 ENCOUNTER — LAB VISIT (OUTPATIENT)
Dept: LAB | Facility: HOSPITAL | Age: 68
End: 2018-08-02
Attending: UROLOGY
Payer: MEDICARE

## 2018-08-02 DIAGNOSIS — E78.5 DYSLIPIDEMIA: ICD-10-CM

## 2018-08-02 DIAGNOSIS — R00.1 BRADYCARDIA: Chronic | ICD-10-CM

## 2018-08-02 DIAGNOSIS — I10 ESSENTIAL HYPERTENSION: Chronic | ICD-10-CM

## 2018-08-02 DIAGNOSIS — Z95.5 STATUS POST CORONARY ARTERY STENT PLACEMENT: Chronic | ICD-10-CM

## 2018-08-02 DIAGNOSIS — R97.20 ELEVATED PSA: ICD-10-CM

## 2018-08-02 LAB — COMPLEXED PSA SERPL-MCNC: 4.3 NG/ML

## 2018-08-02 PROCEDURE — 84153 ASSAY OF PSA TOTAL: CPT

## 2018-08-02 PROCEDURE — 36415 COLL VENOUS BLD VENIPUNCTURE: CPT | Mod: PO

## 2018-08-02 RX ORDER — AMLODIPINE BESYLATE 5 MG/1
TABLET ORAL
Qty: 90 TABLET | Refills: 4 | Status: SHIPPED | OUTPATIENT
Start: 2018-08-02 | End: 2018-10-25 | Stop reason: SDUPTHER

## 2018-08-08 ENCOUNTER — OFFICE VISIT (OUTPATIENT)
Dept: UROLOGY | Facility: CLINIC | Age: 68
End: 2018-08-08
Payer: MEDICARE

## 2018-08-08 VITALS — WEIGHT: 194.44 LBS | BODY MASS INDEX: 27.84 KG/M2 | HEIGHT: 70 IN

## 2018-08-08 DIAGNOSIS — R97.20 ELEVATED PSA: ICD-10-CM

## 2018-08-08 DIAGNOSIS — N40.0 BENIGN PROSTATIC HYPERPLASIA WITHOUT LOWER URINARY TRACT SYMPTOMS: Primary | ICD-10-CM

## 2018-08-08 LAB
BILIRUB SERPL-MCNC: NORMAL MG/DL
BLOOD URINE, POC: NORMAL
COLOR, POC UA: YELLOW
GLUCOSE UR QL STRIP: NORMAL
KETONES UR QL STRIP: NORMAL
LEUKOCYTE ESTERASE URINE, POC: NORMAL
NITRITE, POC UA: NORMAL
PH, POC UA: 5.5
PROTEIN, POC: NORMAL
SPECIFIC GRAVITY, POC UA: 1.02
UROBILINOGEN, POC UA: NORMAL

## 2018-08-08 PROCEDURE — 81002 URINALYSIS NONAUTO W/O SCOPE: CPT | Mod: S$GLB,,, | Performed by: UROLOGY

## 2018-08-08 PROCEDURE — 99999 PR PBB SHADOW E&M-EST. PATIENT-LVL II: CPT | Mod: PBBFAC,,, | Performed by: UROLOGY

## 2018-08-08 PROCEDURE — 99214 OFFICE O/P EST MOD 30 MIN: CPT | Mod: 25,S$GLB,, | Performed by: UROLOGY

## 2018-08-08 NOTE — PROGRESS NOTES
UROLOGY [place]  8 8 18    c-c prostate check    Age 68, nocturia x 2-3, no burning or pains. Stream is good, no need to strain to void. Pt is on proscar.  Nocturia x 2.    Pt also being followed for borderline psa, which was 4.2 one year ago. No nodularities were noted in his rectal exam when he was examined. Had a prostate biopsy in July 2011 and it was negative.   His psa was higher in the past, having been 9.7 six years ago.        PMH     Surgical:  has a past surgical history that includes Coronary stent placement; Colonoscopy; Cardiac catheterization; Cataract extraction w/  intraocular lens implant (Right, 11/24/2015); and Cataract extraction w/  intraocular lens implant (Left, 1/12/2016).     Medical:  has a past medical history of Allergy; Anticoagulant long-term use; BPH (benign prostatic hyperplasia); BPH (benign prostatic hypertrophy); Bradycardia; Cataract; Coronary artery disease; Dyspnea on exertion; Gout; HEARING LOSS; History of myocardial infarction; Hypertension; Nephrolithiasis; Otitis media; and Sleep apnea.      Familial: father had heart failure, mother had a stroke     Social: , lives in [place], retired, used to work for [employer], did asbestos work            Current Outpatient Prescriptions on File Prior to Visit   Medication Sig Dispense Refill    amlodipine (NORVASC) 5 MG tablet Take 1 table. 90 tablet 4    aspirin (ECOTRIN) 81 MG EC tablet Garrick        clopidogrel (PLAVIX) 75 mg tablet Take 1 t. 90 tablet 0    colchicine 0.6 mg tablet 1 da 30 tablet 5    ezetimibe (ZETIA) 10 mg tablet Take 1 tablet (. 30 tablet 12    finasteride (PROSCAR) 5 mg tablet  diff DAY-  90 tablet 3    lisinopril-hydrochlorothiazi (PRINZIDE,ZESTORETIC)  Take 1 tablet  90 tablet 5    nitroGLYCERIN (NITROSTAT) 0.4 MG SL Place 1 ta tongue rachel 25 tablet 12    pravastatin (PRAVACHOL) 20 MG tablet Take 1  90 tablet 4   ROS:  Denies malaise, headaches, eye symptoms, difficulty swallowing or breathing  problems.   No chest pains or palpitations.   No change in bowel habits, no tarry stools or hematochezia. No acid reflux.  No genital lesions.  No bleeding disorders, no seizures.         Pt alert, oriented, no distress  HEENT: wnl.  Neck: supple, no JVD, no lymphadenopathy  Chest: CV NSR  Lungs: normal chest expansion  Abdomen flat, nontender, no organomegaly, no masses.  No hernias  Penis noncircumcised, meatus nl  Testes nl, epi nl, scrotum nl  ANNALEE: anus nl, sphincter nl tone, mucosa without lesions, prostate 40 gm, symmetric, no nodules or indurations  Extremities: no edema, peripheral pulses nl  Neuro: preserved        IMPRESSION:  Bph, on proscar  elevated psa, we need to adjust value for use of proscar (psa currently 8, but has been at this level for several years)  Hypoacusia  Obesity  Has family hx of prostate ca  Can RTC in 6 mo, might have to do another biopsy on account of family hx of prostate ca

## 2018-09-27 RX ORDER — FINASTERIDE 5 MG/1
TABLET, FILM COATED ORAL
Qty: 90 TABLET | Refills: 3 | Status: SHIPPED | OUTPATIENT
Start: 2018-09-27 | End: 2019-08-02

## 2018-10-25 ENCOUNTER — OFFICE VISIT (OUTPATIENT)
Dept: CARDIOLOGY | Facility: CLINIC | Age: 68
End: 2018-10-25
Payer: MEDICARE

## 2018-10-25 VITALS
HEIGHT: 70 IN | HEART RATE: 45 BPM | WEIGHT: 194 LBS | SYSTOLIC BLOOD PRESSURE: 139 MMHG | DIASTOLIC BLOOD PRESSURE: 82 MMHG | BODY MASS INDEX: 27.77 KG/M2

## 2018-10-25 DIAGNOSIS — Z95.5 STATUS POST CORONARY ARTERY STENT PLACEMENT: Primary | Chronic | ICD-10-CM

## 2018-10-25 DIAGNOSIS — I10 ESSENTIAL HYPERTENSION: Chronic | ICD-10-CM

## 2018-10-25 DIAGNOSIS — R00.1 BRADYCARDIA: Chronic | ICD-10-CM

## 2018-10-25 DIAGNOSIS — E78.5 DYSLIPIDEMIA: ICD-10-CM

## 2018-10-25 DIAGNOSIS — I25.10 CORONARY ARTERY DISEASE INVOLVING NATIVE CORONARY ARTERY OF NATIVE HEART WITHOUT ANGINA PECTORIS: Chronic | ICD-10-CM

## 2018-10-25 PROCEDURE — 3075F SYST BP GE 130 - 139MM HG: CPT | Mod: CPTII,,, | Performed by: INTERNAL MEDICINE

## 2018-10-25 PROCEDURE — 1101F PT FALLS ASSESS-DOCD LE1/YR: CPT | Mod: CPTII,,, | Performed by: INTERNAL MEDICINE

## 2018-10-25 PROCEDURE — 3079F DIAST BP 80-89 MM HG: CPT | Mod: CPTII,,, | Performed by: INTERNAL MEDICINE

## 2018-10-25 PROCEDURE — 99214 OFFICE O/P EST MOD 30 MIN: CPT | Mod: S$PBB,,, | Performed by: INTERNAL MEDICINE

## 2018-10-25 PROCEDURE — 99999 PR PBB SHADOW E&M-EST. PATIENT-LVL III: CPT | Mod: PBBFAC,,, | Performed by: INTERNAL MEDICINE

## 2018-10-25 PROCEDURE — 99213 OFFICE O/P EST LOW 20 MIN: CPT | Mod: PBBFAC,PO | Performed by: INTERNAL MEDICINE

## 2018-10-25 RX ORDER — PRAVASTATIN SODIUM 20 MG/1
20 TABLET ORAL NIGHTLY
Qty: 90 TABLET | Refills: 4 | Status: SHIPPED | OUTPATIENT
Start: 2018-10-25 | End: 2019-08-02 | Stop reason: SDUPTHER

## 2018-10-25 RX ORDER — AMLODIPINE BESYLATE 5 MG/1
5 TABLET ORAL NIGHTLY
Qty: 90 TABLET | Refills: 4 | Status: SHIPPED | OUTPATIENT
Start: 2018-10-25 | End: 2019-08-02 | Stop reason: SDUPTHER

## 2018-10-25 RX ORDER — CLOPIDOGREL BISULFATE 75 MG/1
75 TABLET ORAL DAILY
Qty: 90 TABLET | Refills: 4 | Status: SHIPPED | OUTPATIENT
Start: 2018-10-25 | End: 2019-08-02 | Stop reason: SDUPTHER

## 2018-10-25 RX ORDER — EZETIMIBE 10 MG/1
10 TABLET ORAL DAILY
Qty: 90 TABLET | Refills: 5 | Status: SHIPPED | OUTPATIENT
Start: 2018-10-25 | End: 2019-08-02 | Stop reason: SDUPTHER

## 2018-10-25 RX ORDER — VALSARTAN AND HYDROCHLOROTHIAZIDE 160; 12.5 MG/1; MG/1
1 TABLET, FILM COATED ORAL DAILY
Qty: 90 TABLET | Refills: 3 | Status: SHIPPED | OUTPATIENT
Start: 2018-10-25 | End: 2019-08-02 | Stop reason: SDUPTHER

## 2018-10-25 NOTE — PROGRESS NOTES
Subjective:    Patient ID:  Alfonso Ewing is a 68 y.o. male who presents for follow-up of Hypertension (6 Month F/U)      HPI  Here for follow up of CAD-PCI(2011)/low risk abn stress test. Patients states is doing well no chest pain, SOB or change in exertional tolerence. Patient is exercising regularly with out symptoms.        Review of Systems   Constitution: Negative for malaise/fatigue.   Eyes: Negative for blurred vision.   Cardiovascular: Negative for chest pain, claudication, cyanosis, dyspnea on exertion, irregular heartbeat, leg swelling, near-syncope, orthopnea, palpitations, paroxysmal nocturnal dyspnea and syncope.   Respiratory: Negative for cough and shortness of breath.    Hematologic/Lymphatic: Does not bruise/bleed easily.   Musculoskeletal: Negative for back pain, falls, joint pain, muscle cramps, muscle weakness and myalgias.   Gastrointestinal: Negative for abdominal pain, change in bowel habit, nausea and vomiting.   Genitourinary: Negative for urgency.   Neurological: Negative for dizziness, focal weakness and light-headedness.        Objective:    Physical Exam   Constitutional: He is oriented to person, place, and time. He appears well-developed and well-nourished.   Neck: Normal range of motion. Neck supple. No JVD present.   Cardiovascular: Normal rate, regular rhythm, normal heart sounds and intact distal pulses.   Pulses:       Carotid pulses are 2+ on the right side, and 2+ on the left side.       Radial pulses are 2+ on the right side, and 2+ on the left side.   Pulmonary/Chest: Effort normal and breath sounds normal.   Musculoskeletal: Normal range of motion. He exhibits no edema.   Neurological: He is alert and oriented to person, place, and time.   Skin: Skin is warm and dry.   Psychiatric: He has a normal mood and affect. His speech is normal. Cognition and memory are normal.   Nursing note and vitals reviewed.              ..    Chemistry        Component Value Date/Time      01/24/2018 0836    K 4.1 01/24/2018 0836     01/24/2018 0836    CO2 27 01/24/2018 0836    BUN 23 01/24/2018 0836    CREATININE 1.3 01/24/2018 0836     01/24/2018 0836        Component Value Date/Time    CALCIUM 9.7 01/24/2018 0836    ALKPHOS 45 (L) 01/24/2018 0836    AST 31 01/24/2018 0836    ALT 45 (H) 01/24/2018 0836    BILITOT 0.9 01/24/2018 0836    ESTGFRAFRICA >60.0 01/24/2018 0836    EGFRNONAA 55.7 (A) 01/24/2018 0836            ..  Lab Results   Component Value Date    CHOL 153 01/24/2018    CHOL 178 05/31/2017    CHOL 165 10/20/2016     Lab Results   Component Value Date    HDL 38 (L) 01/24/2018    HDL 40 05/31/2017    HDL 36 (L) 10/20/2016     Lab Results   Component Value Date    LDLCALC 88.4 01/24/2018    LDLCALC 99.8 05/31/2017    LDLCALC 75.8 10/20/2016     Lab Results   Component Value Date    TRIG 133 01/24/2018    TRIG 191 (H) 05/31/2017    TRIG 266 (H) 10/20/2016     Lab Results   Component Value Date    CHOLHDL 24.8 01/24/2018    CHOLHDL 22.5 05/31/2017    CHOLHDL 21.8 10/20/2016     ..  Lab Results   Component Value Date    WBC 7.02 05/31/2017    HGB 15.4 05/31/2017    HCT 45.2 05/31/2017    MCV 91 05/31/2017     05/31/2017       Test(s) Reviewed  I have reviewed the following in detail:  [] Stress test   [] Angiography   [] Echocardiogram   [x] Labs   [] Other:       Assessment:         ICD-10-CM ICD-9-CM   1. Status post coronary artery stent placement Z95.5 V45.82   2. Dyslipidemia E78.5 272.4   3. Coronary artery disease involving native coronary artery of native heart without angina pectoris I25.10 414.01   4. Essential hypertension I10 401.9     Problem List Items Addressed This Visit     CAD (coronary artery disease) (Chronic)    Overview     07/11/2011 mid LAD, 80% stenosis; mid LCX, 60% stenosis; mid RCA, 80%        stenosis;                                                                    11/14/2000 LAD, luminal irregularities; LCX, luminal irregularities;          RCA, occluded;                                                                        Dyslipidemia    Overview     hx increased LFT while on niaspan an  vytorin. Myalgia with lipitor, zocor.         Essential hypertension (Chronic)    Status post coronary artery stent placement - Primary (Chronic)    Overview                                          Previous PCI:                                                                S/P coated stent to LAD, 07/29/2011                                          S/P coated stent to RCA, 07/29/2011                                          S/P stent to RCA, 11/14/2000                      Plan:           Return to clinic 10 months   Low level/low impact aerobic exercise 5x's/wk. Heart healthy diet and risk factor modification.    See labs and med orders.  [facility] if develops sx's  Labs next visit

## 2018-12-19 ENCOUNTER — OFFICE VISIT (OUTPATIENT)
Dept: FAMILY MEDICINE | Facility: CLINIC | Age: 68
End: 2018-12-19
Payer: MEDICARE

## 2018-12-19 ENCOUNTER — IMMUNIZATION (OUTPATIENT)
Dept: PHARMACY | Facility: CLINIC | Age: 68
End: 2018-12-19
Payer: MEDICARE

## 2018-12-19 VITALS
WEIGHT: 202.19 LBS | BODY MASS INDEX: 28.95 KG/M2 | OXYGEN SATURATION: 99 % | HEART RATE: 45 BPM | DIASTOLIC BLOOD PRESSURE: 78 MMHG | HEIGHT: 70 IN | SYSTOLIC BLOOD PRESSURE: 145 MMHG

## 2018-12-19 DIAGNOSIS — R00.1 BRADYCARDIA: Chronic | ICD-10-CM

## 2018-12-19 DIAGNOSIS — H25.12 NUCLEAR SENILE CATARACT OF LEFT EYE: ICD-10-CM

## 2018-12-19 DIAGNOSIS — E78.5 DYSLIPIDEMIA: ICD-10-CM

## 2018-12-19 DIAGNOSIS — I10 ESSENTIAL HYPERTENSION: Chronic | ICD-10-CM

## 2018-12-19 DIAGNOSIS — Z95.5 STATUS POST CORONARY ARTERY STENT PLACEMENT: Chronic | ICD-10-CM

## 2018-12-19 DIAGNOSIS — Z00.00 ENCOUNTER FOR PREVENTIVE HEALTH EXAMINATION: Primary | ICD-10-CM

## 2018-12-19 DIAGNOSIS — H90.5 SENSORINEURAL HEARING LOSS (SNHL), UNSPECIFIED LATERALITY: ICD-10-CM

## 2018-12-19 DIAGNOSIS — I25.10 CORONARY ARTERY DISEASE INVOLVING NATIVE CORONARY ARTERY OF NATIVE HEART WITHOUT ANGINA PECTORIS: Chronic | ICD-10-CM

## 2018-12-19 DIAGNOSIS — R94.39 ABNORMAL THALLIUM STRESS TEST: ICD-10-CM

## 2018-12-19 PROCEDURE — 99999 PR PBB SHADOW E&M-EST. PATIENT-LVL IV: CPT | Mod: PBBFAC,,, | Performed by: NURSE PRACTITIONER

## 2018-12-19 PROCEDURE — 3077F SYST BP >= 140 MM HG: CPT | Mod: CPTII,S$GLB,, | Performed by: NURSE PRACTITIONER

## 2018-12-19 PROCEDURE — G0439 PPPS, SUBSEQ VISIT: HCPCS | Mod: S$GLB,,, | Performed by: NURSE PRACTITIONER

## 2018-12-19 PROCEDURE — 3078F DIAST BP <80 MM HG: CPT | Mod: CPTII,S$GLB,, | Performed by: NURSE PRACTITIONER

## 2018-12-19 NOTE — PATIENT INSTRUCTIONS
Counseling and Referral of Other Preventative  (Italic type indicates deductible and co-insurance are waived)    Patient Name: Alfonso Ewing  Today's Date: 12/19/2018    Health Maintenance       Date Due Completion Date    Abdominal Aortic Aneurysm Screening 04/22/2013 ---    Influenza Vaccine 08/01/2018 2/19/2018 (Declined)    Override on 2/19/2018: Declined    Pneumococcal Vaccine (65+ Low/Medium Risk) (2 of 2 - PPSV23) 12/01/2018 12/1/2017    High Dose Statin 12/19/2019 12/19/2018    Colonoscopy 06/06/2021 6/6/2011 (Done)    Override on 6/6/2011: Done    Lipid Panel 01/24/2023 1/24/2018    TETANUS VACCINE 07/13/2028 7/13/2018        No orders of the defined types were placed in this encounter.    The following information is provided to all patients.  This information is to help you find resources for any of the problems found today that may be affecting your health:                Living healthy guide: www.[facility].[place].[facility]      Understanding Diabetes: www.diabetes.org      Eating healthy: www.cdc.gov/healthyweight      CDC home safety checklist: www.cdc.gov/steadi/patient.html      Agency on Aging: www.goea.[place].gov      Alcoholics anonymous (AA): www.aa.org      Physical Activity: www.kathya.nih.gov/ly9tcpy      Tobacco use: www.quitwithusla.org

## 2018-12-21 ENCOUNTER — TELEPHONE (OUTPATIENT)
Dept: GASTROENTEROLOGY | Facility: CLINIC | Age: 68
End: 2018-12-21

## 2018-12-21 NOTE — TELEPHONE ENCOUNTER
Hi,     Pt has been schedule with Dr. Basurto for a colon on 1/31. Pt will need to stop plavix 5 days prior. Please advise.    Thank you,  Page

## 2018-12-24 NOTE — PROGRESS NOTES
"Alfonso Ewing presented for a  Medicare AWV and comprehensive Health Risk Assessment today. The following components were reviewed and updated:    · Medical history  · Family History  · Social history  · Allergies and Current Medications  · Health Risk Assessment  · Health Maintenance  · Care Team     ** See Completed Assessments for Annual Wellness Visit within the encounter summary.**       The following assessments were completed:  · Living Situation  · CAGE  · Depression Screening  · Timed Get Up and Go  · Whisper Test  · Cognitive Function Screening          · Nutrition Screening  · ADL Screening  · PAQ Screening    Vitals:    12/19/18 1116   BP: (!) 145/78   BP Location: Left arm   Patient Position: Sitting   BP Method: Medium (Automatic)   Pulse: (!) 45   SpO2: 99%   Weight: 91.7 kg (202 lb 2.6 oz)   Height: 5' 10" (1.778 m)     Body mass index is 29.01 kg/m².  Physical Exam   Constitutional: He is oriented to person, place, and time. He appears well-developed. No distress.   HENT:   Right Ear: Decreased hearing is noted.   Left Ear: Decreased hearing is noted.   Cardiovascular: Normal heart sounds. Bradycardia present.   Pulmonary/Chest: Effort normal and breath sounds normal. No respiratory distress.   Neurological: He is alert and oriented to person, place, and time. No cranial nerve deficit.   Skin: Skin is warm.   Vitals reviewed.        Diagnoses and health risks identified today and associated recommendations/orders:    1. Encounter for preventive health examination  Reviewed health maintenance and provided recommendations      - Case request GI: COLONOSCOPY    2. Sensorineural hearing loss (SNHL), unspecified laterality  Continue to monitor   Followed by Adrian Raza MD     3. Abnormal thallium stress test  Continue to monitor     4. Bradycardia  Followed by Dr. Borrero    5. Coronary artery disease involving native coronary artery of native heart without angina pectoris  Continue to monitor   No " CP  Followed by Gissell    6. Dyslipidemia  Stable.     Followed by Adrian Raza MD .      7. Essential hypertension  Stable.     Followed by Adrian Raza MD .      8. Status post coronary artery stent placement  No CP  Followed by Gissell.      9. Nuclear senile cataract of left eye  Continue to monitor   Followed by optometry.        Provided Alfonso with a 5-10 year written screening schedule and personal prevention plan. Recommendations were developed using the USPSTF age appropriate recommendations. Education, counseling, and referrals were provided as needed. After Visit Summary printed and given to patient which includes a list of additional screenings\tests needed.    Follow-up in about 1 year (around 12/19/2019).    Kiara Joseph NP

## 2019-01-22 ENCOUNTER — TELEPHONE (OUTPATIENT)
Dept: UROLOGY | Facility: CLINIC | Age: 69
End: 2019-01-22

## 2019-01-22 DIAGNOSIS — R97.20 ELEVATED PSA: Primary | ICD-10-CM

## 2019-01-22 NOTE — TELEPHONE ENCOUNTER
----- Message from Jimmie Garcia sent at 1/22/2019 10:31 AM CST -----  Type:  Patient Call Back    Who Called:pt wife ashanti     Does the patient know what this is regarding?: pt needs to know do they have to have blood work done before pat;s appointment   Best Call Back Number: [phone]   Additional Information:  Please call pt and leave a detailed message if there is no answer.

## 2019-01-31 ENCOUNTER — ANESTHESIA EVENT (OUTPATIENT)
Dept: ENDOSCOPY | Facility: HOSPITAL | Age: 69
End: 2019-01-31
Payer: MEDICARE

## 2019-01-31 ENCOUNTER — HOSPITAL ENCOUNTER (OUTPATIENT)
Facility: HOSPITAL | Age: 69
Discharge: HOME | End: 2019-01-31
Attending: INTERNAL MEDICINE | Admitting: INTERNAL MEDICINE
Payer: MEDICARE

## 2019-01-31 ENCOUNTER — ANESTHESIA (OUTPATIENT)
Dept: ENDOSCOPY | Facility: HOSPITAL | Age: 69
End: 2019-01-31
Payer: MEDICARE

## 2019-01-31 VITALS
HEART RATE: 39 BPM | RESPIRATION RATE: 18 BRPM | TEMPERATURE: 97 F | BODY MASS INDEX: 27.49 KG/M2 | WEIGHT: 192 LBS | DIASTOLIC BLOOD PRESSURE: 63 MMHG | OXYGEN SATURATION: 99 % | SYSTOLIC BLOOD PRESSURE: 145 MMHG | HEIGHT: 70 IN

## 2019-01-31 DIAGNOSIS — R00.1 BRADYCARDIA: ICD-10-CM

## 2019-01-31 DIAGNOSIS — Z12.11 COLON CANCER SCREENING: ICD-10-CM

## 2019-01-31 PROCEDURE — 27201089 HC SNARE, DISP (ANY): Mod: HCNC,PO | Performed by: INTERNAL MEDICINE

## 2019-01-31 PROCEDURE — D9220A PRA ANESTHESIA: Mod: HCNC,PT,ANES, | Performed by: ANESTHESIOLOGY

## 2019-01-31 PROCEDURE — 37000008 HC ANESTHESIA 1ST 15 MINUTES: Mod: HCNC,PO | Performed by: INTERNAL MEDICINE

## 2019-01-31 PROCEDURE — 37000009 HC ANESTHESIA EA ADD 15 MINS: Mod: HCNC,PO | Performed by: INTERNAL MEDICINE

## 2019-01-31 PROCEDURE — 63600175 PHARM REV CODE 636 W HCPCS: Mod: HCNC,PO | Performed by: NURSE ANESTHETIST, CERTIFIED REGISTERED

## 2019-01-31 PROCEDURE — 45385 PR COLONOSCOPY,REMV LESN,SNARE: ICD-10-PCS | Mod: PT,HCNC,, | Performed by: INTERNAL MEDICINE

## 2019-01-31 PROCEDURE — 45385 COLONOSCOPY W/LESION REMOVAL: CPT | Mod: HCNC,PO | Performed by: INTERNAL MEDICINE

## 2019-01-31 PROCEDURE — 93010 EKG 12-LEAD: ICD-10-PCS | Mod: HCNC,,, | Performed by: INTERNAL MEDICINE

## 2019-01-31 PROCEDURE — 88305 TISSUE EXAM BY PATHOLOGIST: CPT | Mod: HCNC | Performed by: PATHOLOGY

## 2019-01-31 PROCEDURE — 25000003 PHARM REV CODE 250: Mod: HCNC,PO | Performed by: INTERNAL MEDICINE

## 2019-01-31 PROCEDURE — 88305 TISSUE EXAM BY PATHOLOGIST: CPT | Mod: 26,HCNC,, | Performed by: PATHOLOGY

## 2019-01-31 PROCEDURE — 88305 TISSUE SPECIMEN TO PATHOLOGY - SURGERY: ICD-10-PCS | Mod: 26,HCNC,, | Performed by: PATHOLOGY

## 2019-01-31 PROCEDURE — D9220A PRA ANESTHESIA: ICD-10-PCS | Mod: HCNC,PT,ANES, | Performed by: ANESTHESIOLOGY

## 2019-01-31 PROCEDURE — 45385 COLONOSCOPY W/LESION REMOVAL: CPT | Mod: PT,HCNC,, | Performed by: INTERNAL MEDICINE

## 2019-01-31 PROCEDURE — 93005 ELECTROCARDIOGRAM TRACING: CPT | Mod: HCNC,PO,59

## 2019-01-31 PROCEDURE — 93010 ELECTROCARDIOGRAM REPORT: CPT | Mod: HCNC,,, | Performed by: INTERNAL MEDICINE

## 2019-01-31 RX ORDER — PROPOFOL 10 MG/ML
VIAL (ML) INTRAVENOUS
Status: DISCONTINUED | OUTPATIENT
Start: 2019-01-31 | End: 2019-01-31

## 2019-01-31 RX ORDER — SODIUM CHLORIDE 0.9 % (FLUSH) 0.9 %
3 SYRINGE (ML) INJECTION
Status: DISCONTINUED | OUTPATIENT
Start: 2019-01-31 | End: 2019-01-31 | Stop reason: HOSPADM

## 2019-01-31 RX ORDER — SODIUM CHLORIDE, SODIUM LACTATE, POTASSIUM CHLORIDE, CALCIUM CHLORIDE 600; 310; 30; 20 MG/100ML; MG/100ML; MG/100ML; MG/100ML
INJECTION, SOLUTION INTRAVENOUS CONTINUOUS
Status: DISCONTINUED | OUTPATIENT
Start: 2019-01-31 | End: 2019-01-31 | Stop reason: HOSPADM

## 2019-01-31 RX ORDER — LIDOCAINE HCL/PF 100 MG/5ML
SYRINGE (ML) INTRAVENOUS
Status: DISCONTINUED | OUTPATIENT
Start: 2019-01-31 | End: 2019-01-31

## 2019-01-31 RX ADMIN — PROPOFOL 100 MG: 10 INJECTION, EMULSION INTRAVENOUS at 11:01

## 2019-01-31 RX ADMIN — PROPOFOL 20 MG: 10 INJECTION, EMULSION INTRAVENOUS at 11:01

## 2019-01-31 RX ADMIN — SODIUM CHLORIDE, SODIUM LACTATE, POTASSIUM CHLORIDE, AND CALCIUM CHLORIDE: .6; .31; .03; .02 INJECTION, SOLUTION INTRAVENOUS at 10:01

## 2019-01-31 RX ADMIN — LIDOCAINE HYDROCHLORIDE 100 MG: 20 INJECTION, SOLUTION INTRAVENOUS at 11:01

## 2019-01-31 NOTE — H&P
History & Physical - Short Stay  Gastroenterology      SUBJECTIVE:     Procedure: Colonoscopy    Chief Complaint/Indication for Procedure: Screening    PTA Medications   Medication Sig    amLODIPine (NORVASC) 5 MG tablet Take 1 tablet (5 mg total) by mouth every evening.    aspirin (ECOTRIN) 81 MG EC tablet Take 81 mg by mouth every evening.     clopidogrel (PLAVIX) 75 mg tablet Take 1 tablet (75 mg total) by mouth once daily.    colchicine 0.6 mg tablet 1 tab now and 1 at bedtime, then 1 tab daily.    ezetimibe (ZETIA) 10 mg tablet Take 1 tablet (10 mg total) by mouth once daily.    finasteride (PROSCAR) 5 mg tablet TAKE 1 TABLET BY MOUTH EVERY DAY    nitroGLYCERIN (NITROSTAT) 0.4 MG SL tablet Place 1 tablet (0.4 mg total) under the tongue every 5 (five) minutes as needed for Chest pain. As directed PRN    pravastatin (PRAVACHOL) 20 MG tablet Take 1 tablet (20 mg total) by mouth every evening.    triamcinolone acetonide 0.1% (KENALOG) 0.1 % cream Apply topically 2 (two) times daily.    valsartan-hydrochlorothiazide (DIOVAN-HCT) 160-12.5 mg per tablet Take 1 tablet by mouth once daily.       Review of patient's allergies indicates:   Allergen Reactions    Niacin Rash    Promethazine Nausea Only    Statins-hmg-coa reductase inhibitors Other (See Comments)     Other reaction(s): Muscle pain        Past Medical History:   Diagnosis Date    Allergy     Anticoagulant long-term use     ASA 81 mg, Plavix    BPH (benign prostatic hyperplasia)     BPH (benign prostatic hypertrophy)     Bradycardia     chronic since 2012    Cataract     ou done//    Coronary artery disease     2 stents    Dyspnea on exertion     stable, chronic    Gout     HEARING LOSS     no hearing aides anymore    History of myocardial infarction 2000    Hypertension     Nephrolithiasis 1980    passed it on his own    Otitis media     Sleep apnea     Hx of sleep apnea, does not use CPAP anymore, feels it has improved     Past  Surgical History:   Procedure Laterality Date    CARDIAC CATHETERIZATION      2 stents    CATARACT EXTRACTION W/  INTRAOCULAR LENS IMPLANT Right 11/24/2015    By Dr Byrd    CATARACT EXTRACTION W/  INTRAOCULAR LENS IMPLANT Left 1/12/2016    [facility]    COLONOSCOPY      CORONARY STENT PLACEMENT      x 2    INSERTION-INTRAOCULAR LENS (IOL) Left 1/12/2016    [facility] at [facility]    INSERTION-INTRAOCULAR LENS (IOL) Right 11/24/2015    [facility]    PHACOEMULSIFICATION-ASPIRATION-CATARACT Left 1/12/2016    [facility]    PHACOEMULSIFICATION-ASPIRATION-CATARACT Right 11/24/2015    [facility] at [facility]     Family History   Problem Relation Age of Onset    Stroke Mother     Kidney disease Mother     Glaucoma Mother     Heart failure Father     Heart disease Father     Hypertension Father     Hyperlipidemia Father     Glaucoma Brother     Diabetes Brother     Heart disease Brother     Hyperlipidemia Brother     Cancer Sister     Hypertension Son     Heart disease Brother     Diabetes Brother     Hyperlipidemia Brother     Heart disease Brother     Hyperlipidemia Brother     Heart disease Brother     Hyperlipidemia Brother     Hypertension Brother     Hypertension Brother     Hyperlipidemia Brother     Heart disease Sister     Hypertension Sister     Hyperlipidemia Sister     Heart disease Sister     Hypertension Sister     Hyperlipidemia Sister     Heart disease Sister     Hypertension Sister     Hyperlipidemia Sister     Prostate cancer Sister     Amblyopia Neg Hx     Blindness Neg Hx     Cataracts Neg Hx     Macular degeneration Neg Hx     Retinal detachment Neg Hx     Strabismus Neg Hx     Thyroid disease Neg Hx      Social History     Tobacco Use    Smoking status: Never Smoker    Smokeless tobacco: Former User     Types: Snuff    Tobacco comment: dipped  tobacco   Substance Use Topics    Alcohol use: No    Drug use: No         OBJECTIVE:     Vital Signs (Most Recent)       Physical Exam                                                        GENERAL:  Comfortable, in no acute distress.                                 HEENT EXAM:  Nonicteric.  No adenopathy.  Oropharynx is clear.               NECK:  Supple.                                                               LUNGS:  Clear.                                                               CARDIAC:  Regular rate and rhythm.  S1, S2.  No murmur.                      ABDOMEN:  Soft, positive bowel sounds, nontender.  No hepatosplenomegaly or masses.  No rebound or guarding.                                             EXTREMITIES:  No edema.     MENTAL STATUS:  Normal, alert and oriented.      ASSESSMENT/PLAN:     Assessment: Colorectal cancer screening    Plan: Colonoscopy    Anesthesia Plan: General    ASA Grade: ASA 2 - Patient with mild systemic disease with no functional limitations    MALLAMPATI SCORE:  I (soft palate, uvula, fauces, and tonsillar pillars visible)

## 2019-01-31 NOTE — PLAN OF CARE
0320 Dr. Escalera called to bedside. Pt's heart rate 37. Pt states that he runs low. Pt asymptomatic. Verbal orders for EKG per Dr. Escalera.

## 2019-01-31 NOTE — DISCHARGE SUMMARY
Discharge Note  Short Stay      SUMMARY     Admit Date: 1/31/2019    Attending Physician: Evan Basurto MD     Discharge Physician: Evan Basurto MD    Discharge Date: 1/31/2019 11:20 AM    Final Diagnosis: Screening [Z13.9]    Disposition: HOME OR SELF CARE    Patient Instructions:   Current Discharge Medication List      CONTINUE these medications which have NOT CHANGED    Details   amLODIPine (NORVASC) 5 MG tablet Take 1 tablet (5 mg total) by mouth every evening.  Qty: 90 tablet, Refills: 4    Associated Diagnoses: Status post coronary artery stent placement; Bradycardia; Dyslipidemia; Essential hypertension      colchicine 0.6 mg tablet 1 tab now and 1 at bedtime, then 1 tab daily.  Qty: 30 tablet, Refills: 2    Associated Diagnoses: Pain in right toe(s)      ezetimibe (ZETIA) 10 mg tablet Take 1 tablet (10 mg total) by mouth once daily.  Qty: 90 tablet, Refills: 5    Associated Diagnoses: Status post coronary artery stent placement; Bradycardia; Dyslipidemia; Essential hypertension      finasteride (PROSCAR) 5 mg tablet TAKE 1 TABLET BY MOUTH EVERY DAY  Qty: 90 tablet, Refills: 3      pravastatin (PRAVACHOL) 20 MG tablet Take 1 tablet (20 mg total) by mouth every evening.  Qty: 90 tablet, Refills: 4    Associated Diagnoses: Status post coronary artery stent placement; Bradycardia; Dyslipidemia; Essential hypertension      valsartan-hydrochlorothiazide (DIOVAN-HCT) 160-12.5 mg per tablet Take 1 tablet by mouth once daily.  Qty: 90 tablet, Refills: 3      aspirin (ECOTRIN) 81 MG EC tablet Take 81 mg by mouth every evening.       clopidogrel (PLAVIX) 75 mg tablet Take 1 tablet (75 mg total) by mouth once daily.  Qty: 90 tablet, Refills: 4    Associated Diagnoses: Status post coronary artery stent placement; Bradycardia; Dyslipidemia; Essential hypertension      nitroGLYCERIN (NITROSTAT) 0.4 MG SL tablet Place 1 tablet (0.4 mg total) under the tongue every 5 (five) minutes as needed for Chest pain. As  directed PRN  Qty: 25 tablet, Refills: 12      triamcinolone acetonide 0.1% (KENALOG) 0.1 % cream Apply topically 2 (two) times daily.  Qty: 45 g, Refills: 0    Associated Diagnoses: Prurigo nodularis             Discharge Procedure Orders (must include Diet, Follow-up, Activity)    Follow Up:  Follow up with PCP as previously scheduled  Resume routine diet.  Activity as tolerated.    No driving day of procedure.

## 2019-01-31 NOTE — ANESTHESIA PREPROCEDURE EVALUATION
01/31/2019  Alfonso Ewing is a 68 y.o., male.    Anesthesia Evaluation    I have reviewed the Patient Summary Reports.    I have reviewed the Nursing Notes.      Review of Systems  Anesthesia Hx:  No problems with previous Anesthesia    Cardiovascular:   Hypertension CAD asymptomatic CABG/stent  CAD - S/P MI.  Stents in place.  Stable   Pulmonary:   Shortness of breath Sleep Apnea, CPAP        Physical Exam  General:  Obesity    Airway/Jaw/Neck:  Airway Findings: Mallampati: II                Anesthesia Plan  Type of Anesthesia, risks & benefits discussed:  Anesthesia Type:  general  Patient's Preference:   Intra-op Monitoring Plan:   Intra-op Monitoring Plan Comments:   Post Op Pain Control Plan:   Post Op Pain Control Plan Comments:   Induction:   IV  Beta Blocker:  Patient is not currently on a Beta-Blocker (No further documentation required).       Informed Consent: Patient understands risks and agrees with Anesthesia plan.  Questions answered. Anesthesia consent signed with patient.  ASA Score: 3     Day of Surgery Review of History & Physical:    H&P update referred to the surgeon.         Ready For Surgery From Anesthesia Perspective.

## 2019-01-31 NOTE — DISCHARGE INSTRUCTIONS

## 2019-01-31 NOTE — ANESTHESIA POSTPROCEDURE EVALUATION
"Anesthesia Post Evaluation    Patient: Alfonso Ewing    Procedure(s) Performed: Procedure(s) (LRB):  COLONOSCOPY (N/A)    Final Anesthesia Type: general  Patient location during evaluation: PACU  Patient participation: Yes- Able to Participate  Level of consciousness: awake and alert  Post-procedure vital signs: reviewed and stable  Pain management: adequate  Airway patency: patent  PONV status at discharge: No PONV  Anesthetic complications: no      Cardiovascular status: blood pressure returned to baseline and hemodynamically stable  Respiratory status: unassisted  Hydration status: euvolemic  Follow-up not needed.        Visit Vitals  BP (!) 149/73 (BP Location: Right arm, Patient Position: Sitting)   Pulse (!) 46   Temp 36.3 °C (97.3 °F) (Skin)   Resp 16   Ht 5' 10" (1.778 m)   Wt 87.1 kg (192 lb)   SpO2 97%   BMI 27.55 kg/m²       Pain/Kofi Score: No Data Recorded      "

## 2019-01-31 NOTE — TRANSFER OF CARE
"Anesthesia Transfer of Care Note    Patient: Alfonso Ewing    Procedure(s) Performed: Procedure(s) (LRB):  COLONOSCOPY (N/A)    Patient location: PACU    Anesthesia Type: general    Transport from OR: Transported from OR on room air with adequate spontaneous ventilation    Post pain: adequate analgesia    Post assessment: no apparent anesthetic complications and tolerated procedure well    Post vital signs: stable    Level of consciousness: sedated    Nausea/Vomiting: no nausea/vomiting    Complications: none    Transfer of care protocol was followed      Last vitals:   Visit Vitals  BP (!) 149/73 (BP Location: Right arm, Patient Position: Sitting)   Pulse (!) 46   Temp 36.3 °C (97.3 °F) (Skin)   Resp 16   Ht 5' 10" (1.778 m)   Wt 87.1 kg (192 lb)   SpO2 97%   BMI 27.55 kg/m²     "

## 2019-02-01 ENCOUNTER — TELEPHONE (OUTPATIENT)
Dept: CARDIOLOGY | Facility: CLINIC | Age: 69
End: 2019-02-01

## 2019-02-01 NOTE — TELEPHONE ENCOUNTER
Spoke to. Pt. Wife, stated pt. May need to f/u with Dr. Borrero from Colonoscopy procedure. Informed pt. Wife f/u appointment it to be scheduled with Dr. Raza PCP. Pt. Wife verbalized understanding.

## 2019-02-01 NOTE — TELEPHONE ENCOUNTER
----- Message from Nate Alvarez sent at 2/1/2019  8:42 AM CST -----  Contact: Crystal - Wife  Type:  Sooner Apoointment Request    Caller is requesting a sooner appointment.    Name of Caller:  Crystal  When is the first available appointment?  8/1  Symptoms:  Very low heart rate after colonoscopy; hospital follow up within 2 weeks  Best Call Back Number:  [phone]  Additional Information:  NA

## 2019-02-04 ENCOUNTER — TELEPHONE (OUTPATIENT)
Dept: FAMILY MEDICINE | Facility: CLINIC | Age: 69
End: 2019-02-04

## 2019-02-04 NOTE — TELEPHONE ENCOUNTER
Spoke w/ wife. Req sooner appt with Dr. Raza only. Has appt for 2/19/19. Advised to keep appt & can be placed on waitlist. Voices understanding.

## 2019-02-04 NOTE — TELEPHONE ENCOUNTER
----- Message from Racquel Flor sent at 2/4/2019 10:55 AM CST -----  Type:  Sooner Apoointment Request    Caller is requesting a sooner appointment.  Requesting a message be sent to doctor.    Name of Caller: Alfonso  When is the first available appointment?  2/19/19  Symptoms:  Heart rate dropped during the colonoscopy#EKG didn't show anything.  He was told to follow up with you within 2 weeks.  It was performed on 1/31/19.  Best Call Back Number:  [phone]  Additional Information:  Thank you!

## 2019-02-19 ENCOUNTER — OFFICE VISIT (OUTPATIENT)
Dept: FAMILY MEDICINE | Facility: CLINIC | Age: 69
End: 2019-02-19
Payer: MEDICARE

## 2019-02-19 ENCOUNTER — LAB VISIT (OUTPATIENT)
Dept: LAB | Facility: HOSPITAL | Age: 69
End: 2019-02-19
Attending: UROLOGY
Payer: MEDICARE

## 2019-02-19 VITALS
OXYGEN SATURATION: 97 % | WEIGHT: 197.75 LBS | DIASTOLIC BLOOD PRESSURE: 80 MMHG | HEIGHT: 70 IN | HEART RATE: 48 BPM | BODY MASS INDEX: 28.31 KG/M2 | SYSTOLIC BLOOD PRESSURE: 130 MMHG

## 2019-02-19 DIAGNOSIS — Z13.6 SCREENING FOR AAA (ABDOMINAL AORTIC ANEURYSM): ICD-10-CM

## 2019-02-19 DIAGNOSIS — H91.90 DECREASED HEARING, UNSPECIFIED LATERALITY: ICD-10-CM

## 2019-02-19 DIAGNOSIS — R97.20 ELEVATED PSA: ICD-10-CM

## 2019-02-19 DIAGNOSIS — I10 ESSENTIAL HYPERTENSION: ICD-10-CM

## 2019-02-19 DIAGNOSIS — G47.33 OSA (OBSTRUCTIVE SLEEP APNEA): ICD-10-CM

## 2019-02-19 DIAGNOSIS — I25.10 CORONARY ARTERY DISEASE, ANGINA PRESENCE UNSPECIFIED, UNSPECIFIED VESSEL OR LESION TYPE, UNSPECIFIED WHETHER NATIVE OR TRANSPLANTED HEART: ICD-10-CM

## 2019-02-19 DIAGNOSIS — E78.5 DYSLIPIDEMIA: ICD-10-CM

## 2019-02-19 DIAGNOSIS — Z00.00 ROUTINE PHYSICAL EXAMINATION: Primary | ICD-10-CM

## 2019-02-19 DIAGNOSIS — R00.1 BRADYCARDIA: ICD-10-CM

## 2019-02-19 PROCEDURE — 84153 ASSAY OF PSA TOTAL: CPT | Mod: HCNC

## 2019-02-19 PROCEDURE — 3079F DIAST BP 80-89 MM HG: CPT | Mod: HCNC,CPTII,S$GLB, | Performed by: INTERNAL MEDICINE

## 2019-02-19 PROCEDURE — 3075F SYST BP GE 130 - 139MM HG: CPT | Mod: HCNC,CPTII,S$GLB, | Performed by: INTERNAL MEDICINE

## 2019-02-19 PROCEDURE — 99999 PR PBB SHADOW E&M-EST. PATIENT-LVL III: ICD-10-PCS | Mod: PBBFAC,HCNC,, | Performed by: INTERNAL MEDICINE

## 2019-02-19 PROCEDURE — 3079F PR MOST RECENT DIASTOLIC BLOOD PRESSURE 80-89 MM HG: ICD-10-PCS | Mod: HCNC,CPTII,S$GLB, | Performed by: INTERNAL MEDICINE

## 2019-02-19 PROCEDURE — 99397 PR PREVENTIVE VISIT,EST,65 & OVER: ICD-10-PCS | Mod: HCNC,S$GLB,, | Performed by: INTERNAL MEDICINE

## 2019-02-19 PROCEDURE — 99999 PR PBB SHADOW E&M-EST. PATIENT-LVL III: CPT | Mod: PBBFAC,HCNC,, | Performed by: INTERNAL MEDICINE

## 2019-02-19 PROCEDURE — 99397 PER PM REEVAL EST PAT 65+ YR: CPT | Mod: HCNC,S$GLB,, | Performed by: INTERNAL MEDICINE

## 2019-02-19 PROCEDURE — 3075F PR MOST RECENT SYSTOLIC BLOOD PRESS GE 130-139MM HG: ICD-10-PCS | Mod: HCNC,CPTII,S$GLB, | Performed by: INTERNAL MEDICINE

## 2019-02-19 PROCEDURE — 80053 COMPREHEN METABOLIC PANEL: CPT | Mod: HCNC

## 2019-02-19 PROCEDURE — 36415 COLL VENOUS BLD VENIPUNCTURE: CPT | Mod: HCNC,PO

## 2019-02-19 NOTE — PROGRESS NOTES
Subjective:       Patient ID: Alfonso Ewing is a 68 y.o. male.    Chief Complaint: Annual Exam    Here for routine health maintenance.    Hard of hearing.  Quit hearing aids.   Gout - rare attacks.  Uric acid mildly high.  Controlled with prn colchicine     Had an episode of bradycardia during c/scp; EKG was normal.  History of HR staying in 40s well known to cardiology.  No dizziness, near syncope or weakness.  Monitors at home and staying the same.   CAD s/p MI s/p stents x2.  Abnormal stress test /124/19.  No chest pain.  Saw Dr Borrero after stress test  HTN - uncontrolled.    HLD - controlled goal < 70 LDL on Zetia (CAN) and 20 mg pravastatin.  Previous high dose statin caused muscle pain.   BPH - controlled  Has a cold and cough that is almost completely gone      Review of Systems   Constitutional: Negative for appetite change and fever.   HENT: Negative for nosebleeds and trouble swallowing.    Eyes: Negative for discharge and visual disturbance.   Respiratory: Positive for cough. Negative for choking and shortness of breath.    Cardiovascular: Negative for chest pain and palpitations.   Gastrointestinal: Negative for abdominal pain, nausea and vomiting.   Musculoskeletal: Negative for arthralgias and joint swelling.   Skin: Negative for rash and wound.   Neurological: Negative for dizziness and syncope.   Psychiatric/Behavioral: Negative for confusion and dysphoric mood.       Objective:      Vitals:    02/19/19 1641   BP: (!) 150/82   Pulse: (!) 48     Physical Exam   Constitutional: He appears well-nourished.   Eyes: Conjunctivae and EOM are normal.   Neck: Trachea normal and normal range of motion. No thyromegaly present.   Cardiovascular: Normal heart sounds.   Edema negative   Pulmonary/Chest: Effort normal and breath sounds normal.   Abdominal: Soft. There is no hepatomegaly.   Neurological: No cranial nerve deficit.   DTR decreased bilateral   Skin: Skin is warm, dry and intact.   Psychiatric: He has a  normal mood and affect.   Alert and Oriented    Vitals reviewed.        Assessment:       1. Routine physical examination    2. Essential hypertension    3. Dyslipidemia    4. Bradycardia    5. Coronary artery disease, angina presence unspecified, unspecified vessel or lesion type, unspecified whether native or transplanted heart        Plan:       Routine physical examination    Essential hypertension  -     Comprehensive metabolic panel; Future; Expected date: 02/19/2019    Dyslipidemia    Bradycardia    Coronary artery disease, angina presence unspecified, unspecified vessel or lesion type, unspecified whether native or transplanted heart    NIC (obstructive sleep apnea)  -     Ambulatory consult to Sleep Disorders    Decreased hearing, unspecified laterality  -     Ambulatory consult to Audiology    Screening for AAA (abdominal aortic aneurysm)            Medication List with Changes/Refills   Current Medications    AMLODIPINE (NORVASC) 5 MG TABLET    Take 1 tablet (5 mg total) by mouth every evening.    ASPIRIN (ECOTRIN) 81 MG EC TABLET    Take 81 mg by mouth every evening.     CLOPIDOGREL (PLAVIX) 75 MG TABLET    Take 1 tablet (75 mg total) by mouth once daily.    COLCHICINE 0.6 MG TABLET    1 tab now and 1 at bedtime, then 1 tab daily.    EZETIMIBE (ZETIA) 10 MG TABLET    Take 1 tablet (10 mg total) by mouth once daily.    FINASTERIDE (PROSCAR) 5 MG TABLET    TAKE 1 TABLET BY MOUTH EVERY DAY    NITROGLYCERIN (NITROSTAT) 0.4 MG SL TABLET    Place 1 tablet (0.4 mg total) under the tongue every 5 (five) minutes as needed for Chest pain. As directed PRN    PRAVASTATIN (PRAVACHOL) 20 MG TABLET    Take 1 tablet (20 mg total) by mouth every evening.    TRIAMCINOLONE ACETONIDE 0.1% (KENALOG) 0.1 % CREAM    Apply topically 2 (two) times daily.    VALSARTAN-HYDROCHLOROTHIAZIDE (DIOVAN-HCT) 160-12.5 MG PER TABLET    Take 1 tablet by mouth once daily.     Wellness reviewed  Stated card checked his AAA screen on one of  his echos - could not find record  No flu today while recovering from cold  Monitor sx and contact cardiology if worsen or HR worsens - likely related to procedure/sedation  Continue current management and monitor.    Counseled on regular exercise, maintenance of a healthy weight, balanced diet rich in fruits/vegetables and lean protein, and avoidance of unhealthy habits like smoking and excessive alcohol intake.   Also, counseled on importance of being compliant with medication, health appointments, diet and exercise.     Follow-up in about 1 year (around 2/19/2020).

## 2019-02-20 LAB
ALBUMIN SERPL BCP-MCNC: 4 G/DL
ALP SERPL-CCNC: 39 U/L
ALT SERPL W/O P-5'-P-CCNC: 36 U/L
ANION GAP SERPL CALC-SCNC: 11 MMOL/L
AST SERPL-CCNC: 31 U/L
BILIRUB SERPL-MCNC: 0.5 MG/DL
BUN SERPL-MCNC: 25 MG/DL
CALCIUM SERPL-MCNC: 9.8 MG/DL
CHLORIDE SERPL-SCNC: 104 MMOL/L
CO2 SERPL-SCNC: 27 MMOL/L
COMPLEXED PSA SERPL-MCNC: 4.7 NG/ML
CREAT SERPL-MCNC: 1.6 MG/DL
EST. GFR  (AFRICAN AMERICAN): 49.7 ML/MIN/1.73 M^2
EST. GFR  (NON AFRICAN AMERICAN): 43 ML/MIN/1.73 M^2
GLUCOSE SERPL-MCNC: 103 MG/DL
POTASSIUM SERPL-SCNC: 3.7 MMOL/L
PROT SERPL-MCNC: 7.3 G/DL
SODIUM SERPL-SCNC: 142 MMOL/L

## 2019-02-21 ENCOUNTER — TELEPHONE (OUTPATIENT)
Dept: FAMILY MEDICINE | Facility: CLINIC | Age: 69
End: 2019-02-21

## 2019-02-21 DIAGNOSIS — N18.30 CKD (CHRONIC KIDNEY DISEASE), STAGE III: Primary | ICD-10-CM

## 2019-02-25 ENCOUNTER — OFFICE VISIT (OUTPATIENT)
Dept: NEPHROLOGY | Facility: CLINIC | Age: 69
End: 2019-02-25
Payer: MEDICARE

## 2019-02-25 VITALS
DIASTOLIC BLOOD PRESSURE: 72 MMHG | HEART RATE: 57 BPM | HEIGHT: 70 IN | WEIGHT: 195.13 LBS | SYSTOLIC BLOOD PRESSURE: 136 MMHG | OXYGEN SATURATION: 98 % | BODY MASS INDEX: 27.94 KG/M2

## 2019-02-25 DIAGNOSIS — N18.30 CKD (CHRONIC KIDNEY DISEASE) STAGE 3, GFR 30-59 ML/MIN: Primary | ICD-10-CM

## 2019-02-25 PROCEDURE — 99204 PR OFFICE/OUTPT VISIT, NEW, LEVL IV, 45-59 MIN: ICD-10-PCS | Mod: HCNC,S$GLB,, | Performed by: INTERNAL MEDICINE

## 2019-02-25 PROCEDURE — 99999 PR PBB SHADOW E&M-EST. PATIENT-LVL III: CPT | Mod: PBBFAC,HCNC,, | Performed by: INTERNAL MEDICINE

## 2019-02-25 PROCEDURE — 99499 UNLISTED E&M SERVICE: CPT | Mod: S$GLB,,, | Performed by: INTERNAL MEDICINE

## 2019-02-25 PROCEDURE — 3075F PR MOST RECENT SYSTOLIC BLOOD PRESS GE 130-139MM HG: ICD-10-PCS | Mod: HCNC,CPTII,S$GLB, | Performed by: INTERNAL MEDICINE

## 2019-02-25 PROCEDURE — 3078F PR MOST RECENT DIASTOLIC BLOOD PRESSURE < 80 MM HG: ICD-10-PCS | Mod: HCNC,CPTII,S$GLB, | Performed by: INTERNAL MEDICINE

## 2019-02-25 PROCEDURE — 99999 PR PBB SHADOW E&M-EST. PATIENT-LVL III: ICD-10-PCS | Mod: PBBFAC,HCNC,, | Performed by: INTERNAL MEDICINE

## 2019-02-25 PROCEDURE — 99204 OFFICE O/P NEW MOD 45 MIN: CPT | Mod: HCNC,S$GLB,, | Performed by: INTERNAL MEDICINE

## 2019-02-25 PROCEDURE — 1101F PT FALLS ASSESS-DOCD LE1/YR: CPT | Mod: HCNC,CPTII,S$GLB, | Performed by: INTERNAL MEDICINE

## 2019-02-25 PROCEDURE — 99499 RISK ADDL DX/OHS AUDIT: ICD-10-PCS | Mod: S$GLB,,, | Performed by: INTERNAL MEDICINE

## 2019-02-25 PROCEDURE — 3075F SYST BP GE 130 - 139MM HG: CPT | Mod: HCNC,CPTII,S$GLB, | Performed by: INTERNAL MEDICINE

## 2019-02-25 PROCEDURE — 1101F PR PT FALLS ASSESS DOC 0-1 FALLS W/OUT INJ PAST YR: ICD-10-PCS | Mod: HCNC,CPTII,S$GLB, | Performed by: INTERNAL MEDICINE

## 2019-02-25 PROCEDURE — 3078F DIAST BP <80 MM HG: CPT | Mod: HCNC,CPTII,S$GLB, | Performed by: INTERNAL MEDICINE

## 2019-02-25 NOTE — PROGRESS NOTES
Subjective:       Patient ID: Alfonso Ewing is a 68 y.o. White male who presents for new patient evaluation for chronic renal failure.    Alfonso Ewing is referred by Adrian Raza MD to be evaluated for chronic renal failure.  He reports that he does not recall that he has been told he has kidney disease.  He has chronic bradycardia and is followed by Dr. Borrero.  He has no uremic or urinary symptoms and is in his usual state of health.  There have been no recent illnesses, hospitalizations or procedures.  He is to get a hearing aid evaluation soon.      Review of Systems   Constitutional: Negative for appetite change, chills and fever.   HENT: Positive for hearing loss.         Dry mouth   Eyes: Negative for visual disturbance.   Respiratory: Negative for cough and shortness of breath.    Cardiovascular: Negative for chest pain and leg swelling.        Bradycardia   Gastrointestinal: Negative for diarrhea, nausea and vomiting.   Genitourinary: Negative for difficulty urinating, dysuria and hematuria.   Musculoskeletal: Negative for myalgias.   Skin: Negative for rash.   Neurological: Negative for headaches.   Psychiatric/Behavioral: Negative for sleep disturbance.       The past medical, family and social histories were reviewed for this encounter.     Past Medical History:   Diagnosis Date    Allergy     Anticoagulant long-term use     ASA 81 mg, Plavix    BPH (benign prostatic hyperplasia)     BPH (benign prostatic hypertrophy)     Bradycardia     chronic since 2012    Cataract     ou done//    Coronary artery disease     2 stents    Dyspnea on exertion     stable, chronic    Gout     HEARING LOSS     no hearing aides anymore    History of myocardial infarction 2000    Hypertension     Nephrolithiasis 1980    passed it on his own    Otitis media     Sleep apnea     Hx of sleep apnea, does not use CPAP anymore, feels it has improved     Past Surgical History:   Procedure Laterality Date    CARDIAC  CATHETERIZATION      2 stents    CATARACT EXTRACTION W/  INTRAOCULAR LENS IMPLANT Right 11/24/2015    By Dr Byrd    CATARACT EXTRACTION W/  INTRAOCULAR LENS IMPLANT Left 1/12/2016    [facility]    COLONOSCOPY      COLONOSCOPY N/A 1/31/2019    [facility]    CORONARY STENT PLACEMENT      x 2    INSERTION-INTRAOCULAR LENS (IOL) Left 1/12/2016    [facility]    INSERTION-INTRAOCULAR LENS (IOL) Right 11/24/2015    [facility]    PHACOEMULSIFICATION-ASPIRATION-CATARACT Left 1/12/2016    [facility]    PHACOEMULSIFICATION-ASPIRATION-CATARACT Right 11/24/2015    [facility] at [facility]     Social History     Socioeconomic History    Marital status:      Spouse name: Not on file    Number of children: 2    Years of education: Not on file    Highest education level: Not on file   Social Needs    Financial resource strain: Not on file    Food insecurity - worry: Not on file    Food insecurity - inability: Not on file    Transportation needs - medical: Not on file    Transportation needs - non-medical: Not on file   Occupational History    Occupation: retired retired     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    aspirin (ECOTRIN) 81 MG EC tablet Take 81 mg by mouth every evening.     clopidogrel (PLAVIX) 75 mg tablet Take 1 tablet (75 mg  "total) by mouth once daily.    colchicine 0.6 mg tablet 1 tab now and 1 at bedtime, then 1 tab daily.    ezetimibe (ZETIA) 10 mg tablet Take 1 tablet (10 mg total) by mouth once daily.    finasteride (PROSCAR) 5 mg tablet TAKE 1 TABLET BY MOUTH EVERY DAY    nitroGLYCERIN (NITROSTAT) 0.4 MG SL tablet Place 1 tablet (0.4 mg total) under the tongue every 5 (five) minutes as needed for Chest pain. As directed PRN    pravastatin (PRAVACHOL) 20 MG tablet Take 1 tablet (20 mg total) by mouth every evening.    triamcinolone acetonide 0.1% (KENALOG) 0.1 % cream Apply topically 2 (two) times daily.    valsartan-hydrochlorothiazide (DIOVAN-HCT) 160-12.5 mg per tablet Take 1 tablet by mouth once daily.     No current facility-administered medications for this visit.        /72 (BP Location: Left arm, Patient Position: Sitting)   Pulse (!) 57   Ht 5' 10" (1.778 m)   Wt 88.5 kg (195 lb 1.7 oz)   SpO2 98%   BMI 27.99 kg/m²     Objective:      Physical Exam   Constitutional: He is oriented to person, place, and time. He appears well-developed and well-nourished. No distress.   HENT:   Head: Normocephalic and atraumatic.   Eyes: Conjunctivae are normal.   Neck: Neck supple. No JVD present.   Cardiovascular: Normal rate, regular rhythm and normal heart sounds. Exam reveals no gallop and no friction rub.   No murmur heard.  Pulmonary/Chest: Effort normal and breath sounds normal. No respiratory distress. He has no wheezes. He has no rales.   Abdominal: Soft. Bowel sounds are normal. He exhibits no distension. There is no tenderness.   Musculoskeletal: He exhibits no edema.   Neurological: He is alert and oriented to person, place, and time.   Skin: Skin is warm and dry. No rash noted.   Psychiatric: He has a normal mood and affect.   Vitals reviewed.      Assessment:       1. CKD (chronic kidney disease) stage 3, GFR 30-59 ml/min        Plan:   Return to clinic in 6 months.  Labs for next visit include rp, pth, ua, " renal US.  Baseline creatinine is 1.3-1.5 since 2004.  Blood pressure is controlled on the current regimen.  Continue current medications as prescribed and reviewed.   His function has been stable but I will check his urine and a renal US to further define his anatomy.

## 2019-02-25 NOTE — LETTER
February 25, 2019      Adrian Raza MD  [address] [facility] [address] [place] [address]           [facility]  [address] [facility] [address] [place] [address]  Phone: [phone]          Patient: Alfonso Ewing   MR Number: [id number]   YOB: 1950   Date of Visit: 2/25/2019       Dear Dr. Adrian Raza:    Thank you for referring Alfonso Ewing to me for evaluation. Attached you will find relevant portions of my assessment and plan of care.    If you have questions, please do not hesitate to call me. I look forward to following Alfonso Ewing along with you.    Sincerely,    Quin Shabazz, HECTOR    Enclosure  CC:  No Recipients    If you would like to receive this communication electronically, please contact [email or web address] or [phone] to request more information on [facility] access.    For providers and/or their staff who would like to refer a patient to [facility], please contact us through our one-stop-shop provider referral line, Zara Mcdonald, at [phone].    If you feel you have received this communication in error or would no longer like to receive these types of communications, please e-mail [email or web address]

## 2019-04-04 ENCOUNTER — OFFICE VISIT (OUTPATIENT)
Dept: UROLOGY | Facility: CLINIC | Age: 69
End: 2019-04-04
Payer: MEDICARE

## 2019-04-04 VITALS
HEIGHT: 70 IN | WEIGHT: 195.56 LBS | SYSTOLIC BLOOD PRESSURE: 141 MMHG | BODY MASS INDEX: 28 KG/M2 | HEART RATE: 60 BPM | DIASTOLIC BLOOD PRESSURE: 75 MMHG

## 2019-04-04 DIAGNOSIS — N40.1 BENIGN PROSTATIC HYPERPLASIA WITH URINARY OBSTRUCTION: ICD-10-CM

## 2019-04-04 DIAGNOSIS — N13.8 BENIGN PROSTATIC HYPERPLASIA WITH URINARY OBSTRUCTION: ICD-10-CM

## 2019-04-04 DIAGNOSIS — R97.20 ELEVATED PSA: Primary | ICD-10-CM

## 2019-04-04 PROCEDURE — 3077F SYST BP >= 140 MM HG: CPT | Mod: HCNC,CPTII,S$GLB, | Performed by: UROLOGY

## 2019-04-04 PROCEDURE — 3077F PR MOST RECENT SYSTOLIC BLOOD PRESSURE >= 140 MM HG: ICD-10-PCS | Mod: HCNC,CPTII,S$GLB, | Performed by: UROLOGY

## 2019-04-04 PROCEDURE — 3078F PR MOST RECENT DIASTOLIC BLOOD PRESSURE < 80 MM HG: ICD-10-PCS | Mod: HCNC,CPTII,S$GLB, | Performed by: UROLOGY

## 2019-04-04 PROCEDURE — 3078F DIAST BP <80 MM HG: CPT | Mod: HCNC,CPTII,S$GLB, | Performed by: UROLOGY

## 2019-04-04 PROCEDURE — 1101F PR PT FALLS ASSESS DOC 0-1 FALLS W/OUT INJ PAST YR: ICD-10-PCS | Mod: HCNC,CPTII,S$GLB, | Performed by: UROLOGY

## 2019-04-04 PROCEDURE — 1101F PT FALLS ASSESS-DOCD LE1/YR: CPT | Mod: HCNC,CPTII,S$GLB, | Performed by: UROLOGY

## 2019-04-04 PROCEDURE — 81002 URINALYSIS NONAUTO W/O SCOPE: CPT | Mod: HCNC,S$GLB,, | Performed by: UROLOGY

## 2019-04-04 PROCEDURE — 99999 PR PBB SHADOW E&M-EST. PATIENT-LVL III: ICD-10-PCS | Mod: PBBFAC,HCNC,, | Performed by: UROLOGY

## 2019-04-04 PROCEDURE — 99214 OFFICE O/P EST MOD 30 MIN: CPT | Mod: 25,HCNC,S$GLB, | Performed by: UROLOGY

## 2019-04-04 PROCEDURE — 99214 PR OFFICE/OUTPT VISIT, EST, LEVL IV, 30-39 MIN: ICD-10-PCS | Mod: 25,HCNC,S$GLB, | Performed by: UROLOGY

## 2019-04-04 PROCEDURE — 81002 POCT URINE DIPSTICK WITHOUT MICROSCOPE: ICD-10-PCS | Mod: HCNC,S$GLB,, | Performed by: UROLOGY

## 2019-04-04 PROCEDURE — 99999 PR PBB SHADOW E&M-EST. PATIENT-LVL III: CPT | Mod: PBBFAC,HCNC,, | Performed by: UROLOGY

## 2019-04-04 RX ORDER — FINASTERIDE 5 MG/1
5 TABLET, FILM COATED ORAL DAILY
Qty: 90 TABLET | Refills: 3 | Status: SHIPPED | OUTPATIENT
Start: 2019-04-04 | End: 2020-03-24 | Stop reason: SDUPTHER

## 2019-04-04 NOTE — PROGRESS NOTES
UROLOGY [place]  4 4 19    c-c prostate check     Severely hearing impaired. Age 69, comes in to follow up on his psa level. Has been fluctuating between 3 and 5 for the last 3-4 years. The latest value was 4.7 last month. It was 4.3 in aug 2018. Pt is on proscar but he takes it every other day. No nodularities were noted in his rectal exam when he was examined in the past. Had a prostate biopsy in July 2011 by dr hastings and it was negative. His psa was higher in the past, having been 9.7 six years ago.    Voids with acceptable stream, no hesitancy or need to strain. Has nocturia x 2-3, no burning or pains.    PMH     Surgical:  has a past surgical history that includes Coronary stent placement; Colonoscopy; Cardiac catheterization; Cataract extraction w/  intraocular lens implant (Right, 11/24/2015); and Cataract extraction w/  intraocular lens implant (Left, 1/12/2016).     Medical:  has a past medical history of Allergy; Anticoagulant long-term use; BPH (benign prostatic hyperplasia); BPH (benign prostatic hypertrophy); Bradycardia; Cataract; Coronary artery disease; Dyspnea on exertion; Gout; HEARING LOSS; History of myocardial infarction; Hypertension; Nephrolithiasis; Otitis media; and Sleep apnea.      Familial: father had heart failure, mother had a stroke     Social: , lives in [place], retired, used to work for [employer], did asbestos work                 Current Outpatient Prescriptions on File Prior to Visit   Medication Sig Dispense Refill    amlodipine (NORVASC) 5 MG tablet Take 1 table. 90 tablet 4    aspirin (ECOTRIN) 81  Garrick        clopidogrel (PLAVIX)  Take 1 t. 90 tablet 0    colchicine 0.6 mg tablet 1 da 30 tablet 5    ezetimibe (ZETIA) 10 m  30 tablet 12    finasteride (PROSC  diff DAY-  90 tablet 3    lisinopril-hydrochlorothiazi (PRINZIDE,ZESTORETIC)  Take 1 tablet  90 tablet 5    nitroGLYCERIN (NITROSTAT) 0.4 MG SL Place 1 ta to 25 tablet 12    pravastatin (PRAVACH Take 1  90  tablet 4   ROS:  Denies malaise, headaches, eye symptoms, difficulty swallowing or breathing problems.   No chest pains or palpitations.   No change in bowel habits, no tarry stools or hematochezia. Rare acid reflux.  No genital lesions.  No bleeding disorders, no seizures.         Pt alert, oriented, no distress  HEENT: wnl.  Neck: supple, no JVD, no lymphadenopathy  Chest: CV NSR  Lungs: normal chest expansion  Abdomen flat, nontender, no organomegaly, no masses.  No hernias  Penis noncircumcised, meatus nl  Testes nl, epi nl, scrotum nl  ANNALEE: anus nl, sphincter nl tone, mucosa without lesions, prostate 40 gm, symmetric, no nodules or indurations  Extremities: no edema, peripheral pulses nl  Neuro: preserved        IMPRESSION:    Bph, on proscar. I suggest he get on a standard dosing of proscar, so as to better interpret the true value of psa.   Borderline psa, has stayed generally stable. Will see again on 6 months and update the psa.  Hypoacusia  Obesity  Has family hx of prostate ca  Might have to do another biopsy on account of family hx of prostate ca

## 2019-04-08 RX ORDER — VALSARTAN AND HYDROCHLOROTHIAZIDE 160; 12.5 MG/1; MG/1
TABLET, FILM COATED ORAL
Qty: 90 TABLET | Refills: 4 | Status: SHIPPED | OUTPATIENT
Start: 2019-04-08 | End: 2019-08-02

## 2019-06-07 ENCOUNTER — PES CALL (OUTPATIENT)
Dept: ADMINISTRATIVE | Facility: CLINIC | Age: 69
End: 2019-06-07

## 2019-07-25 ENCOUNTER — CLINICAL SUPPORT (OUTPATIENT)
Dept: CARDIOLOGY | Facility: CLINIC | Age: 69
End: 2019-07-25
Attending: INTERNAL MEDICINE
Payer: MEDICARE

## 2019-07-25 ENCOUNTER — LAB VISIT (OUTPATIENT)
Dept: LAB | Facility: HOSPITAL | Age: 69
End: 2019-07-25
Attending: INTERNAL MEDICINE
Payer: MEDICARE

## 2019-07-25 VITALS
WEIGHT: 195.56 LBS | DIASTOLIC BLOOD PRESSURE: 70 MMHG | BODY MASS INDEX: 28 KG/M2 | HEIGHT: 70 IN | HEART RATE: 52 BPM | SYSTOLIC BLOOD PRESSURE: 133 MMHG

## 2019-07-25 DIAGNOSIS — Z95.5 STATUS POST CORONARY ARTERY STENT PLACEMENT: Chronic | ICD-10-CM

## 2019-07-25 DIAGNOSIS — I25.10 CORONARY ARTERY DISEASE INVOLVING NATIVE CORONARY ARTERY OF NATIVE HEART WITHOUT ANGINA PECTORIS: Chronic | ICD-10-CM

## 2019-07-25 DIAGNOSIS — I10 ESSENTIAL HYPERTENSION: ICD-10-CM

## 2019-07-25 DIAGNOSIS — Z95.5 STATUS POST CORONARY ARTERY STENT PLACEMENT: ICD-10-CM

## 2019-07-25 DIAGNOSIS — E78.5 DYSLIPIDEMIA: ICD-10-CM

## 2019-07-25 DIAGNOSIS — I25.10 CORONARY ARTERY DISEASE INVOLVING NATIVE CORONARY ARTERY OF NATIVE HEART WITHOUT ANGINA PECTORIS: ICD-10-CM

## 2019-07-25 DIAGNOSIS — N18.30 CKD (CHRONIC KIDNEY DISEASE) STAGE 3, GFR 30-59 ML/MIN: ICD-10-CM

## 2019-07-25 DIAGNOSIS — I10 ESSENTIAL HYPERTENSION: Chronic | ICD-10-CM

## 2019-07-25 LAB
ALBUMIN SERPL BCP-MCNC: 4.2 G/DL (ref 3.5–5.2)
ALP SERPL-CCNC: 44 U/L (ref 55–135)
ALT SERPL W/O P-5'-P-CCNC: 32 U/L (ref 10–44)
ANION GAP SERPL CALC-SCNC: 10 MMOL/L (ref 8–16)
AST SERPL-CCNC: 31 U/L (ref 10–40)
BASOPHILS # BLD AUTO: 0.03 K/UL (ref 0–0.2)
BASOPHILS NFR BLD: 0.5 % (ref 0–1.9)
BILIRUB SERPL-MCNC: 0.8 MG/DL (ref 0.1–1)
BUN SERPL-MCNC: 18 MG/DL (ref 8–23)
CALCIUM SERPL-MCNC: 10.5 MG/DL (ref 8.7–10.5)
CHLORIDE SERPL-SCNC: 103 MMOL/L (ref 95–110)
CHOLEST SERPL-MCNC: 172 MG/DL (ref 120–199)
CHOLEST/HDLC SERPL: 4.1 {RATIO} (ref 2–5)
CK SERPL-CCNC: 196 U/L (ref 20–200)
CO2 SERPL-SCNC: 29 MMOL/L (ref 23–29)
CREAT SERPL-MCNC: 1.5 MG/DL (ref 0.5–1.4)
DIFFERENTIAL METHOD: NORMAL
EOSINOPHIL # BLD AUTO: 0.1 K/UL (ref 0–0.5)
EOSINOPHIL NFR BLD: 1.2 % (ref 0–8)
ERYTHROCYTE [DISTWIDTH] IN BLOOD BY AUTOMATED COUNT: 12.7 % (ref 11.5–14.5)
EST. GFR  (AFRICAN AMERICAN): 53.4 ML/MIN/1.73 M^2
EST. GFR  (NON AFRICAN AMERICAN): 46.2 ML/MIN/1.73 M^2
GLUCOSE SERPL-MCNC: 107 MG/DL (ref 70–110)
HCT VFR BLD AUTO: 47.7 % (ref 40–54)
HDLC SERPL-MCNC: 42 MG/DL (ref 40–75)
HDLC SERPL: 24.4 % (ref 20–50)
HGB BLD-MCNC: 15.7 G/DL (ref 14–18)
IMM GRANULOCYTES # BLD AUTO: 0.02 K/UL (ref 0–0.04)
IMM GRANULOCYTES NFR BLD AUTO: 0.3 % (ref 0–0.5)
LDLC SERPL CALC-MCNC: 86 MG/DL (ref 63–159)
LYMPHOCYTES # BLD AUTO: 1.2 K/UL (ref 1–4.8)
LYMPHOCYTES NFR BLD: 18.7 % (ref 18–48)
MCH RBC QN AUTO: 30.7 PG (ref 27–31)
MCHC RBC AUTO-ENTMCNC: 32.9 G/DL (ref 32–36)
MCV RBC AUTO: 93 FL (ref 82–98)
MONOCYTES # BLD AUTO: 0.5 K/UL (ref 0.3–1)
MONOCYTES NFR BLD: 7.3 % (ref 4–15)
NEUTROPHILS # BLD AUTO: 4.7 K/UL (ref 1.8–7.7)
NEUTROPHILS NFR BLD: 72 % (ref 38–73)
NONHDLC SERPL-MCNC: 130 MG/DL
NRBC BLD-RTO: 0 /100 WBC
PHOSPHATE SERPL-MCNC: 3 MG/DL (ref 2.7–4.5)
PLATELET # BLD AUTO: 186 K/UL (ref 150–350)
PMV BLD AUTO: 12.5 FL (ref 9.2–12.9)
POTASSIUM SERPL-SCNC: 5 MMOL/L (ref 3.5–5.1)
PROT SERPL-MCNC: 7.3 G/DL (ref 6–8.4)
PTH-INTACT SERPL-MCNC: 76 PG/ML (ref 9–77)
RBC # BLD AUTO: 5.12 M/UL (ref 4.6–6.2)
SODIUM SERPL-SCNC: 142 MMOL/L (ref 136–145)
TRIGL SERPL-MCNC: 220 MG/DL (ref 30–150)
TSH SERPL DL<=0.005 MIU/L-ACNC: 2 UIU/ML (ref 0.4–4)
WBC # BLD AUTO: 6.54 K/UL (ref 3.9–12.7)

## 2019-07-25 PROCEDURE — 85025 COMPLETE CBC W/AUTO DIFF WBC: CPT

## 2019-07-25 PROCEDURE — 83970 ASSAY OF PARATHORMONE: CPT

## 2019-07-25 PROCEDURE — 93880 CV US DOPPLER CAROTID: ICD-10-PCS | Mod: S$GLB,,, | Performed by: INTERNAL MEDICINE

## 2019-07-25 PROCEDURE — 80061 LIPID PANEL: CPT

## 2019-07-25 PROCEDURE — 93306 TRANSTHORACIC ECHO (TTE) COMPLETE: ICD-10-PCS | Mod: S$GLB,,, | Performed by: INTERNAL MEDICINE

## 2019-07-25 PROCEDURE — 84100 ASSAY OF PHOSPHORUS: CPT

## 2019-07-25 PROCEDURE — 93880 EXTRACRANIAL BILAT STUDY: CPT | Mod: S$GLB,,, | Performed by: INTERNAL MEDICINE

## 2019-07-25 PROCEDURE — 84443 ASSAY THYROID STIM HORMONE: CPT

## 2019-07-25 PROCEDURE — 99999 PR PBB SHADOW E&M-EST. PATIENT-LVL II: ICD-10-PCS | Mod: PBBFAC,,,

## 2019-07-25 PROCEDURE — 93306 TTE W/DOPPLER COMPLETE: CPT | Mod: S$GLB,,, | Performed by: INTERNAL MEDICINE

## 2019-07-25 PROCEDURE — 80053 COMPREHEN METABOLIC PANEL: CPT

## 2019-07-25 PROCEDURE — 82550 ASSAY OF CK (CPK): CPT

## 2019-07-25 PROCEDURE — 36415 COLL VENOUS BLD VENIPUNCTURE: CPT | Mod: PO

## 2019-07-25 PROCEDURE — 99999 PR PBB SHADOW E&M-EST. PATIENT-LVL II: CPT | Mod: PBBFAC,,,

## 2019-07-26 LAB
ASCENDING AORTA: 3.24 CM
AV INDEX (PROSTH): 0.63
AV MEAN GRADIENT: 6 MMHG
AV PEAK GRADIENT: 9 MMHG
AV VALVE AREA: 1.98 CM2
AV VELOCITY RATIO: 0.66
BSA FOR ECHO PROCEDURE: 2.09 M2
CV ECHO LV RWT: 0.4 CM
DOP CALC AO PEAK VEL: 1.51 M/S
DOP CALC AO VTI: 40.37 CM
DOP CALC LVOT AREA: 3.1 CM2
DOP CALC LVOT DIAMETER: 2 CM
DOP CALC LVOT PEAK VEL: 1 M/S
DOP CALC LVOT STROKE VOLUME: 79.82 CM3
DOP CALCLVOT PEAK VEL VTI: 25.42 CM
E WAVE DECELERATION TIME: 177.81 MSEC
E/A RATIO: 0.86
E/E' RATIO: 10.31 M/S
ECHO LV POSTERIOR WALL: 1.01 CM (ref 0.6–1.1)
FRACTIONAL SHORTENING: 32 % (ref 28–44)
INTERVENTRICULAR SEPTUM: 1.11 CM (ref 0.6–1.1)
LA MAJOR: 5.35 CM
LA MINOR: 5.06 CM
LA WIDTH: 3.37 CM
LEFT ATRIUM SIZE: 3.88 CM
LEFT ATRIUM VOLUME INDEX: 28 ML/M2
LEFT ATRIUM VOLUME: 57.8 CM3
LEFT INTERNAL DIMENSION IN SYSTOLE: 3.39 CM (ref 2.1–4)
LEFT VENTRICLE DIASTOLIC VOLUME INDEX: 57.75 ML/M2
LEFT VENTRICLE DIASTOLIC VOLUME: 119.39 ML
LEFT VENTRICLE MASS INDEX: 96 G/M2
LEFT VENTRICLE SYSTOLIC VOLUME INDEX: 22.8 ML/M2
LEFT VENTRICLE SYSTOLIC VOLUME: 47.06 ML
LEFT VENTRICULAR INTERNAL DIMENSION IN DIASTOLE: 5.02 CM (ref 3.5–6)
LEFT VENTRICULAR MASS: 198.19 G
LV LATERAL E/E' RATIO: 9.57 M/S
LV SEPTAL E/E' RATIO: 11.17 M/S
MV PEAK A VEL: 0.78 M/S
MV PEAK E VEL: 0.67 M/S
PISA TR MAX VEL: 2.54 M/S
PULM VEIN S/D RATIO: 1.29
PV PEAK D VEL: 0.45 M/S
PV PEAK S VEL: 0.58 M/S
RA MAJOR: 5.22 CM
RA PRESSURE: 3 MMHG
RA WIDTH: 3.1 CM
RIGHT VENTRICULAR END-DIASTOLIC DIMENSION: 2.99 CM
RV TISSUE DOPPLER FREE WALL SYSTOLIC VELOCITY 1 (APICAL 4 CHAMBER VIEW): 12.04 CM/S
SINUS: 3.31 CM
STJ: 2.82 CM
TDI LATERAL: 0.07 M/S
TDI SEPTAL: 0.06 M/S
TDI: 0.07 M/S
TR MAX PG: 26 MMHG
TRICUSPID ANNULAR PLANE SYSTOLIC EXCURSION: 2.36 CM
TV REST PULMONARY ARTERY PRESSURE: 29 MMHG

## 2019-07-29 ENCOUNTER — TELEPHONE (OUTPATIENT)
Dept: NEPHROLOGY | Facility: CLINIC | Age: 69
End: 2019-07-29

## 2019-07-29 LAB
LEFT ARM DIASTOLIC BLOOD PRESSURE: 70 MMHG
LEFT ARM SYSTOLIC BLOOD PRESSURE: 133 MMHG
LEFT CBA DIAS: 14 CM/S
LEFT CBA SYS: 58 CM/S
LEFT CCA DIST DIAS: 18 CM/S
LEFT CCA DIST SYS: 69 CM/S
LEFT CCA MID DIAS: 18 CM/S
LEFT CCA MID SYS: 77 CM/S
LEFT CCA PROX DIAS: 14 CM/S
LEFT CCA PROX SYS: 82 CM/S
LEFT ECA DIAS: 5 CM/S
LEFT ECA SYS: 69 CM/S
LEFT ICA DIST DIAS: 19 CM/S
LEFT ICA DIST SYS: 69 CM/S
LEFT ICA MID DIAS: 19 CM/S
LEFT ICA MID SYS: 69 CM/S
LEFT ICA PROX DIAS: 15 CM/S
LEFT ICA PROX SYS: 55 CM/S
LEFT VERTEBRAL DIAS: 8 CM/S
LEFT VERTEBRAL SYS: 37 CM/S
OHS CV CAROTID RIGHT ICA EDV HIGHEST: 20
OHS CV CAROTID ULTRASOUND LEFT ICA/CCA RATIO: 0.84
OHS CV CAROTID ULTRASOUND RIGHT ICA/CCA RATIO: 0.72
OHS CV PV CAROTID LEFT HIGHEST CCA: 82
OHS CV PV CAROTID LEFT HIGHEST ICA: 69
OHS CV PV CAROTID RIGHT HIGHEST CCA: 98
OHS CV PV CAROTID RIGHT HIGHEST ICA: 71
OHS CV US CAROTID LEFT HIGHEST EDV: 19
RIGHT ARM DIASTOLIC BLOOD PRESSURE: 70 MMHG
RIGHT ARM SYSTOLIC BLOOD PRESSURE: 133 MMHG
RIGHT CBA DIAS: 11 CM/S
RIGHT CBA SYS: 56 CM/S
RIGHT CCA DIST DIAS: 17 CM/S
RIGHT CCA DIST SYS: 72 CM/S
RIGHT CCA MID DIAS: 19 CM/S
RIGHT CCA MID SYS: 98 CM/S
RIGHT CCA PROX DIAS: 10 CM/S
RIGHT CCA PROX SYS: 65 CM/S
RIGHT ECA DIAS: 9 CM/S
RIGHT ECA SYS: 80 CM/S
RIGHT ICA DIST DIAS: 15 CM/S
RIGHT ICA DIST SYS: 69 CM/S
RIGHT ICA MID DIAS: 20 CM/S
RIGHT ICA MID SYS: 71 CM/S
RIGHT ICA PROX DIAS: 12 CM/S
RIGHT ICA PROX SYS: 52 CM/S
RIGHT VERTEBRAL DIAS: 14 CM/S
RIGHT VERTEBRAL SYS: 69 CM/S

## 2019-07-29 NOTE — TELEPHONE ENCOUNTER
----- Message from Raúl Butts sent at 7/29/2019  3:51 PM CDT -----  Contact: Crystal  Type: Needs Medical Advice    Who Called:  Patient's wife Crystal  Symptoms (please be specific):    How long has patient had these symptoms:    Pharmacy name and phone #:    Best Call Back Number: [phone]  Additional Information: requesting a call back regarding upcoming appointment

## 2019-07-29 NOTE — TELEPHONE ENCOUNTER
Spoke to the pt's wife and she wanted to move her husbands appointment forward a month, anytime in September.  It will be for an EP appointment with Dr Mcgowan.

## 2019-08-02 ENCOUNTER — OFFICE VISIT (OUTPATIENT)
Dept: CARDIOLOGY | Facility: CLINIC | Age: 69
End: 2019-08-02
Payer: MEDICARE

## 2019-08-02 VITALS
HEIGHT: 70 IN | BODY MASS INDEX: 29.01 KG/M2 | HEART RATE: 50 BPM | WEIGHT: 202.63 LBS | SYSTOLIC BLOOD PRESSURE: 132 MMHG | DIASTOLIC BLOOD PRESSURE: 77 MMHG

## 2019-08-02 DIAGNOSIS — I25.10 CORONARY ARTERY DISEASE INVOLVING NATIVE CORONARY ARTERY OF NATIVE HEART WITHOUT ANGINA PECTORIS: Chronic | ICD-10-CM

## 2019-08-02 DIAGNOSIS — E78.5 DYSLIPIDEMIA: ICD-10-CM

## 2019-08-02 DIAGNOSIS — R00.1 BRADYCARDIA: Chronic | ICD-10-CM

## 2019-08-02 DIAGNOSIS — I10 ESSENTIAL HYPERTENSION: Chronic | ICD-10-CM

## 2019-08-02 DIAGNOSIS — Z95.5 STATUS POST CORONARY ARTERY STENT PLACEMENT: Primary | Chronic | ICD-10-CM

## 2019-08-02 PROCEDURE — 3075F SYST BP GE 130 - 139MM HG: CPT | Mod: CPTII,S$GLB,, | Performed by: INTERNAL MEDICINE

## 2019-08-02 PROCEDURE — 1101F PT FALLS ASSESS-DOCD LE1/YR: CPT | Mod: CPTII,S$GLB,, | Performed by: INTERNAL MEDICINE

## 2019-08-02 PROCEDURE — 99214 PR OFFICE/OUTPT VISIT, EST, LEVL IV, 30-39 MIN: ICD-10-PCS | Mod: S$GLB,,, | Performed by: INTERNAL MEDICINE

## 2019-08-02 PROCEDURE — 3075F PR MOST RECENT SYSTOLIC BLOOD PRESS GE 130-139MM HG: ICD-10-PCS | Mod: CPTII,S$GLB,, | Performed by: INTERNAL MEDICINE

## 2019-08-02 PROCEDURE — 3078F PR MOST RECENT DIASTOLIC BLOOD PRESSURE < 80 MM HG: ICD-10-PCS | Mod: CPTII,S$GLB,, | Performed by: INTERNAL MEDICINE

## 2019-08-02 PROCEDURE — 99999 PR PBB SHADOW E&M-EST. PATIENT-LVL III: ICD-10-PCS | Mod: PBBFAC,,, | Performed by: INTERNAL MEDICINE

## 2019-08-02 PROCEDURE — 1101F PR PT FALLS ASSESS DOC 0-1 FALLS W/OUT INJ PAST YR: ICD-10-PCS | Mod: CPTII,S$GLB,, | Performed by: INTERNAL MEDICINE

## 2019-08-02 PROCEDURE — 99214 OFFICE O/P EST MOD 30 MIN: CPT | Mod: S$GLB,,, | Performed by: INTERNAL MEDICINE

## 2019-08-02 PROCEDURE — 99999 PR PBB SHADOW E&M-EST. PATIENT-LVL III: CPT | Mod: PBBFAC,,, | Performed by: INTERNAL MEDICINE

## 2019-08-02 PROCEDURE — 3078F DIAST BP <80 MM HG: CPT | Mod: CPTII,S$GLB,, | Performed by: INTERNAL MEDICINE

## 2019-08-02 RX ORDER — PRAVASTATIN SODIUM 20 MG/1
20 TABLET ORAL NIGHTLY
Qty: 90 TABLET | Refills: 4 | Status: SHIPPED | OUTPATIENT
Start: 2019-08-02 | End: 2019-08-02

## 2019-08-02 RX ORDER — VALSARTAN AND HYDROCHLOROTHIAZIDE 160; 12.5 MG/1; MG/1
1 TABLET, FILM COATED ORAL DAILY
Qty: 90 TABLET | Refills: 3 | Status: SHIPPED | OUTPATIENT
Start: 2019-08-02 | End: 2020-06-29 | Stop reason: SDUPTHER

## 2019-08-02 RX ORDER — CLOPIDOGREL BISULFATE 75 MG/1
75 TABLET ORAL DAILY
Qty: 90 TABLET | Refills: 4 | Status: ON HOLD | OUTPATIENT
Start: 2019-08-02 | End: 2020-08-01 | Stop reason: SDUPTHER

## 2019-08-02 RX ORDER — AMLODIPINE BESYLATE 5 MG/1
5 TABLET ORAL NIGHTLY
Qty: 90 TABLET | Refills: 4 | Status: SHIPPED | OUTPATIENT
Start: 2019-08-02 | End: 2019-12-27 | Stop reason: SDUPTHER

## 2019-08-02 RX ORDER — EZETIMIBE 10 MG/1
10 TABLET ORAL DAILY
Qty: 90 TABLET | Refills: 5 | Status: SHIPPED | OUTPATIENT
Start: 2019-08-02 | End: 2020-08-07 | Stop reason: SDUPTHER

## 2019-08-02 RX ORDER — NITROGLYCERIN 0.4 MG/1
0.4 TABLET SUBLINGUAL EVERY 5 MIN PRN
Qty: 25 TABLET | Refills: 12 | Status: SHIPPED | OUTPATIENT
Start: 2019-08-02 | End: 2021-01-14

## 2019-08-02 RX ORDER — PRAVASTATIN SODIUM 20 MG/1
20 TABLET ORAL NIGHTLY
Qty: 90 TABLET | Refills: 4 | Status: SHIPPED | OUTPATIENT
Start: 2019-08-02 | End: 2020-08-20

## 2019-08-02 NOTE — PROGRESS NOTES
Subjective:    Patient ID:  Alfonso Ewing is a 69 y.o. male who presents for follow-up of No chief complaint on file.      HPI  Here for follow up of CAD-PCI(2011)/low risk abn stress test. Remians very active. No CP. No change in exertional tolerance.     Review of Systems   Constitution: Negative for malaise/fatigue.   Eyes: Negative for blurred vision.   Cardiovascular: Negative for chest pain, claudication, cyanosis, dyspnea on exertion, irregular heartbeat, leg swelling, near-syncope, orthopnea, palpitations, paroxysmal nocturnal dyspnea and syncope.   Respiratory: Negative for cough and shortness of breath.    Hematologic/Lymphatic: Does not bruise/bleed easily.   Musculoskeletal: Negative for back pain, falls, joint pain, muscle cramps, muscle weakness and myalgias.   Gastrointestinal: Negative for abdominal pain, change in bowel habit, nausea and vomiting.   Genitourinary: Negative for urgency.   Neurological: Negative for dizziness, focal weakness and light-headedness.       Past Medical History:   Diagnosis Date    Allergy     Anticoagulant long-term use     ASA 81 mg, Plavix    BPH (benign prostatic hyperplasia)     BPH (benign prostatic hypertrophy)     Bradycardia     chronic since 2012    Bradycardia 6/21/2012    CAD (coronary artery disease) 2/13/2012 07/11/2011 mid LAD, 80% stenosis; mid LCX, 60% stenosis; mid RCA, 80%       stenosis;                                                                   11/14/2000 LAD, luminal irregularities; LCX, luminal irregularities;        RCA, occluded;                                                                  Cataract     ou done//    Coronary artery disease     2 stents    Dyslipidemia 2/13/2012    hx increased LFT while on niaspan an  vytorin. Myalgia with lipitor, zocor.     Dyspnea on exertion     stable, chronic    Essential hypertension 2/13/2012    Gout     HEARING LOSS     SEVERE -  No hearing aids anymore    History of myocardial  infarction 2000    Hypertension     Nephrolithiasis 1980    passed it on his own    Otitis media     Sleep apnea     Hx of sleep apnea, does not use CPAP anymore, feels it has improved    Status post coronary artery stent placement 6/21/2012                                        Previous PCI:                                                               S/P coated stent to LAD, 07/29/2011                                         S/P coated stent to RCA, 07/29/2011                                         S/P stent to RCA, 11/14/2000                Objective:     Vitals:    08/02/19 1432   BP: 132/77   Pulse: (!) 50        Physical Exam   Constitutional: He is oriented to person, place, and time. He appears well-developed and well-nourished.   Neck: Normal range of motion. Neck supple. No JVD present.   Cardiovascular: Normal rate, regular rhythm, normal heart sounds and intact distal pulses.   Pulses:       Carotid pulses are 2+ on the right side, and 2+ on the left side.       Radial pulses are 2+ on the right side, and 2+ on the left side.   Pulmonary/Chest: Effort normal and breath sounds normal.   Musculoskeletal: Normal range of motion. He exhibits no edema.   Neurological: He is alert and oriented to person, place, and time.   Skin: Skin is warm and dry.   Psychiatric: He has a normal mood and affect. His speech is normal. Cognition and memory are normal.   Nursing note and vitals reviewed.            ..    Chemistry        Component Value Date/Time     07/25/2019 0855    K 5.0 07/25/2019 0855     07/25/2019 0855    CO2 29 07/25/2019 0855    BUN 18 07/25/2019 0855    CREATININE 1.5 (H) 07/25/2019 0855     07/25/2019 0855        Component Value Date/Time    CALCIUM 10.5 07/25/2019 0855    ALKPHOS 44 (L) 07/25/2019 0855    AST 31 07/25/2019 0855    ALT 32 07/25/2019 0855    BILITOT 0.8 07/25/2019 0855    ESTGFRAFRICA 53.4 (A) 07/25/2019 0855    EGFRNONAA 46.2 (A) 07/25/2019 0855             ..  Lab Results   Component Value Date    CHOL 172 07/25/2019    CHOL 153 01/24/2018    CHOL 178 05/31/2017     Lab Results   Component Value Date    HDL 42 07/25/2019    HDL 38 (L) 01/24/2018    HDL 40 05/31/2017     Lab Results   Component Value Date    LDLCALC 86.0 07/25/2019    LDLCALC 88.4 01/24/2018    LDLCALC 99.8 05/31/2017     Lab Results   Component Value Date    TRIG 220 (H) 07/25/2019    TRIG 133 01/24/2018    TRIG 191 (H) 05/31/2017     Lab Results   Component Value Date    CHOLHDL 24.4 07/25/2019    CHOLHDL 24.8 01/24/2018    CHOLHDL 22.5 05/31/2017     ..  Lab Results   Component Value Date    WBC 6.54 07/25/2019    HGB 15.7 07/25/2019    HCT 47.7 07/25/2019    MCV 93 07/25/2019     07/25/2019       Test(s) Reviewed  I have reviewed the following in detail:  [x] Stress test   [] Angiography   [x] Echocardiogram   [x] Labs   [x] Other:       Assessment:         ICD-10-CM ICD-9-CM   1. Status post coronary artery stent placement Z95.5 V45.82   2. Coronary artery disease involving native coronary artery of native heart without angina pectoris I25.10 414.01   3. Essential hypertension I10 401.9   4. Dyslipidemia E78.5 272.4     Problem List Items Addressed This Visit     Status post coronary artery stent placement - Primary (Chronic)    Overview                                          Previous PCI:                                                                S/P coated stent to LAD, 07/29/2011                                          S/P coated stent to RCA, 07/29/2011                                          S/P stent to RCA, 11/14/2000               Essential hypertension (Chronic)    Dyslipidemia    Overview     hx increased LFT while on niaspan an  vytorin. Myalgia with lipitor, zocor.         CAD (coronary artery disease) (Chronic)    Overview     07/11/2011 mid LAD, 80% stenosis; mid LCX, 60% stenosis; mid RCA, 80%        stenosis;                                                                     11/14/2000 LAD, luminal irregularities; LCX, luminal irregularities;         RCA, occluded;                                                                               Plan:           Return to clinic 1 year   Low level/low impact aerobic exercise 5x's/wk. Heart healthy diet and risk factor modification.    See labs and med orders.      Portions of this note may have been created with voice recognition software.  Grammatical, syntax and spelling errors may be inevitable.

## 2019-09-18 ENCOUNTER — OFFICE VISIT (OUTPATIENT)
Dept: NEPHROLOGY | Facility: CLINIC | Age: 69
End: 2019-09-18
Payer: MEDICARE

## 2019-09-18 VITALS
WEIGHT: 200.81 LBS | SYSTOLIC BLOOD PRESSURE: 136 MMHG | HEART RATE: 45 BPM | DIASTOLIC BLOOD PRESSURE: 68 MMHG | BODY MASS INDEX: 28.75 KG/M2 | HEIGHT: 70 IN | OXYGEN SATURATION: 98 %

## 2019-09-18 DIAGNOSIS — N18.30 CKD (CHRONIC KIDNEY DISEASE) STAGE 3, GFR 30-59 ML/MIN: Primary | ICD-10-CM

## 2019-09-18 DIAGNOSIS — I10 ESSENTIAL HYPERTENSION: Chronic | ICD-10-CM

## 2019-09-18 PROCEDURE — 1101F PR PT FALLS ASSESS DOC 0-1 FALLS W/OUT INJ PAST YR: ICD-10-PCS | Mod: HCNC,CPTII,S$GLB, | Performed by: INTERNAL MEDICINE

## 2019-09-18 PROCEDURE — 99999 PR PBB SHADOW E&M-EST. PATIENT-LVL III: CPT | Mod: PBBFAC,HCNC,, | Performed by: INTERNAL MEDICINE

## 2019-09-18 PROCEDURE — 3078F PR MOST RECENT DIASTOLIC BLOOD PRESSURE < 80 MM HG: ICD-10-PCS | Mod: HCNC,CPTII,S$GLB, | Performed by: INTERNAL MEDICINE

## 2019-09-18 PROCEDURE — 3075F PR MOST RECENT SYSTOLIC BLOOD PRESS GE 130-139MM HG: ICD-10-PCS | Mod: HCNC,CPTII,S$GLB, | Performed by: INTERNAL MEDICINE

## 2019-09-18 PROCEDURE — 3078F DIAST BP <80 MM HG: CPT | Mod: HCNC,CPTII,S$GLB, | Performed by: INTERNAL MEDICINE

## 2019-09-18 PROCEDURE — 99214 PR OFFICE/OUTPT VISIT, EST, LEVL IV, 30-39 MIN: ICD-10-PCS | Mod: HCNC,S$GLB,, | Performed by: INTERNAL MEDICINE

## 2019-09-18 PROCEDURE — 99999 PR PBB SHADOW E&M-EST. PATIENT-LVL III: ICD-10-PCS | Mod: PBBFAC,HCNC,, | Performed by: INTERNAL MEDICINE

## 2019-09-18 PROCEDURE — 1101F PT FALLS ASSESS-DOCD LE1/YR: CPT | Mod: HCNC,CPTII,S$GLB, | Performed by: INTERNAL MEDICINE

## 2019-09-18 PROCEDURE — 3075F SYST BP GE 130 - 139MM HG: CPT | Mod: HCNC,CPTII,S$GLB, | Performed by: INTERNAL MEDICINE

## 2019-09-18 PROCEDURE — 99214 OFFICE O/P EST MOD 30 MIN: CPT | Mod: HCNC,S$GLB,, | Performed by: INTERNAL MEDICINE

## 2019-09-18 NOTE — PROGRESS NOTES
"Subjective:       Patient ID: Alfonso Ewing is a 69 y.o. White male who presents for return patient evaluation for chronic renal failure.    He has chronic bradycardia and is followed by Dr. Borrero.  He has no uremic or urinary symptoms and is in his usual state of health.  There have been no recent illnesses, hospitalizations or procedures.  He is to get a hearing aid evaluation soon.      Review of Systems   Constitutional: Negative for appetite change, chills and fever.   HENT: Positive for hearing loss.         Dry mouth   Eyes: Negative for visual disturbance.   Respiratory: Negative for cough and shortness of breath.    Cardiovascular: Negative for chest pain and leg swelling.        Bradycardia   Gastrointestinal: Negative for diarrhea, nausea and vomiting.   Genitourinary: Negative for difficulty urinating, dysuria and hematuria.   Musculoskeletal: Negative for myalgias.   Skin: Negative for rash.   Neurological: Negative for headaches.   Psychiatric/Behavioral: Negative for sleep disturbance.       The past medical, family and social histories were reviewed for this encounter.     /68 (BP Location: Right arm, Patient Position: Sitting)   Pulse (!) 45   Ht 5' 10" (1.778 m)   Wt 91.1 kg (200 lb 13.4 oz)   SpO2 98%   BMI 28.82 kg/m²     Objective:      Physical Exam   Constitutional: He is oriented to person, place, and time. He appears well-developed and well-nourished. No distress.   HENT:   Head: Normocephalic and atraumatic.   Eyes: Conjunctivae are normal.   Neck: Neck supple. No JVD present.   Cardiovascular: Normal rate, regular rhythm and normal heart sounds. Exam reveals no gallop and no friction rub.   No murmur heard.  Pulmonary/Chest: Effort normal and breath sounds normal. No respiratory distress. He has no wheezes. He has no rales.   Abdominal: Soft. Bowel sounds are normal. He exhibits no distension. There is no tenderness.   Musculoskeletal: He exhibits edema (1= BLE).   Neurological: " He is alert and oriented to person, place, and time.   Skin: Skin is warm and dry. No rash noted.   Psychiatric: He has a normal mood and affect.   Vitals reviewed.      Assessment:       1. CKD (chronic kidney disease) stage 3, GFR 30-59 ml/min    2. Essential hypertension        Plan:   Return to clinic in 6 months.  Labs for next visit include rp, pth, renal US.  Baseline creatinine is 1.3-1.5 since 2004.  PTH is 76 with a calcium of 10.5.   Blood pressure is controlled on the current regimen.  Continue current medications as prescribed and reviewed.

## 2019-09-19 ENCOUNTER — IMMUNIZATION (OUTPATIENT)
Dept: PHARMACY | Facility: CLINIC | Age: 69
End: 2019-09-19
Payer: MEDICARE

## 2019-09-19 ENCOUNTER — IMMUNIZATION (OUTPATIENT)
Dept: PHARMACY | Facility: CLINIC | Age: 69
End: 2019-09-19

## 2019-10-02 ENCOUNTER — OFFICE VISIT (OUTPATIENT)
Dept: DERMATOLOGY | Facility: CLINIC | Age: 69
End: 2019-10-02
Payer: MEDICARE

## 2019-10-02 VITALS — WEIGHT: 200.81 LBS | HEIGHT: 70 IN | BODY MASS INDEX: 28.75 KG/M2 | RESPIRATION RATE: 18 BRPM

## 2019-10-02 DIAGNOSIS — L82.1 SEBORRHEIC KERATOSES: ICD-10-CM

## 2019-10-02 DIAGNOSIS — L57.0 ACTINIC KERATOSIS: Primary | ICD-10-CM

## 2019-10-02 DIAGNOSIS — D17.20 LIPOMA OF UPPER EXTREMITY, UNSPECIFIED LATERALITY: ICD-10-CM

## 2019-10-02 PROCEDURE — 99499 RISK ADDL DX/OHS AUDIT: ICD-10-PCS | Mod: HCNC,S$GLB,, | Performed by: DERMATOLOGY

## 2019-10-02 PROCEDURE — 99999 PR PBB SHADOW E&M-EST. PATIENT-LVL III: CPT | Mod: PBBFAC,HCNC,, | Performed by: DERMATOLOGY

## 2019-10-02 PROCEDURE — 99213 PR OFFICE/OUTPT VISIT, EST, LEVL III, 20-29 MIN: ICD-10-PCS | Mod: HCNC,S$GLB,, | Performed by: DERMATOLOGY

## 2019-10-02 PROCEDURE — 99213 OFFICE O/P EST LOW 20 MIN: CPT | Mod: HCNC,S$GLB,, | Performed by: DERMATOLOGY

## 2019-10-02 PROCEDURE — 1101F PT FALLS ASSESS-DOCD LE1/YR: CPT | Mod: HCNC,CPTII,S$GLB, | Performed by: DERMATOLOGY

## 2019-10-02 PROCEDURE — 99499 UNLISTED E&M SERVICE: CPT | Mod: HCNC,S$GLB,, | Performed by: DERMATOLOGY

## 2019-10-02 PROCEDURE — 1101F PR PT FALLS ASSESS DOC 0-1 FALLS W/OUT INJ PAST YR: ICD-10-PCS | Mod: HCNC,CPTII,S$GLB, | Performed by: DERMATOLOGY

## 2019-10-02 PROCEDURE — 99999 PR PBB SHADOW E&M-EST. PATIENT-LVL III: ICD-10-PCS | Mod: PBBFAC,HCNC,, | Performed by: DERMATOLOGY

## 2019-10-02 RX ORDER — FLUOROURACIL 50 MG/G
CREAM TOPICAL 2 TIMES DAILY
Qty: 40 G | Refills: 1 | Status: SHIPPED | OUTPATIENT
Start: 2019-10-02 | End: 2021-01-14

## 2019-10-02 NOTE — PROGRESS NOTES
Subjective:       Patient ID:  Alfonso Ewing is a 69 y.o. male who presents for   Chief Complaint   Patient presents with    Lesion     lesion(s) on both arms    Growth     knot on left arm     HPI  68 yo M presents with his wife for evaluation of two knots located on his left arm that are not painful. They may have actually decreased in size. No prior treatments.  He would like to have them checked.  The patient states he also has multiple lesions located on both arms he would like checked.     Denies personal or family h/o skin cancer  Extensive exposure to sun . Worked outdoors, fished, & gardened      Past Medical History:   Diagnosis Date    Allergy     Anticoagulant long-term use     ASA 81 mg, Plavix    BPH (benign prostatic hyperplasia)     BPH (benign prostatic hypertrophy)     Bradycardia     chronic since 2012    Bradycardia 6/21/2012    CAD (coronary artery disease) 2/13/2012 07/11/2011 mid LAD, 80% stenosis; mid LCX, 60% stenosis; mid RCA, 80%       stenosis;                                                                   11/14/2000 LAD, luminal irregularities; LCX, luminal irregularities;        RCA, occluded;                                                                  Cataract     ou done//    Coronary artery disease     2 stents    Dyslipidemia 2/13/2012    hx increased LFT while on niaspan an  vytorin. Myalgia with lipitor, zocor.     Dyspnea on exertion     stable, chronic    Essential hypertension 2/13/2012    Gout     HEARING LOSS     SEVERE -  No hearing aids anymore    History of myocardial infarction 2000    Hypertension     Nephrolithiasis 1980    passed it on his own    Otitis media     Sleep apnea     Hx of sleep apnea, does not use CPAP anymore, feels it has improved    Status post coronary artery stent placement 6/21/2012                                        Previous PCI:                                                               S/P coated stent to  LAD, 07/29/2011                                         S/P coated stent to RCA, 07/29/2011                                         S/P stent to RCA, 11/14/2000           Review of Systems   Skin: Positive for dry skin.   Hematologic/Lymphatic: Bruises/bleeds easily.        Objective:    Physical Exam   Constitutional: He appears well-developed and well-nourished.   HENT:   Mouth/Throat: Lips normal.    Eyes: Lids are normal.    Neurological: He is alert and oriented to person, place, and time.   Psychiatric: He has a normal mood and affect.   Skin:   Areas Examined (abnormalities noted in diagram):   Head / Face Inspection Performed  Neck Inspection Performed  RUE Inspected  LUE Inspection Performed              Diagram Legend     Erythematous scaling macule/papule c/w actinic keratosis       Vascular papule c/w angioma      Pigmented verrucoid papule/plaque c/w seborrheic keratosis      Yellow umbilicated papule c/w sebaceous hyperplasia      Irregularly shaped tan macule c/w lentigo     1-2 mm smooth white papules consistent with Milia      Movable subcutaneous cyst with punctum c/w epidermal inclusion cyst      Subcutaneous movable cyst c/w pilar cyst      Firm pink to brown papule c/w dermatofibroma      Pedunculated fleshy papule(s) c/w skin tag(s)      Evenly pigmented macule c/w junctional nevus     Mildly variegated pigmented, slightly irregular-bordered macule c/w mildly atypical nevus      Flesh colored to evenly pigmented papule c/w intradermal nevus       Pink pearly papule/plaque c/w basal cell carcinoma      Erythematous hyperkeratotic cursted plaque c/w SCC      Surgical scar with no sign of skin cancer recurrence      Open and closed comedones      Inflammatory papules and pustules      Verrucoid papule consistent consistent with wart     Erythematous eczematous patches and plaques     Dystrophic onycholytic nail with subungual debris c/w onychomycosis     Umbilicated papule    Erythematous-base  heme-crusted tan verrucoid plaque consistent with inflamed seborrheic keratosis     Erythematous Silvery Scaling Plaque c/w Psoriasis     See annotation      Assessment / Plan:        Actinic keratosis  Discussed treatment options including LN, field treatment with topical chemotherapy vs PDT  Discussed the likely erythema, crusting, swelling, inflammation and showed photos of what to expect    -     fluorouracil (EFUDEX) 5 % cream; Apply topically 2 (two) times daily. To L arm x 2 weeks; then twice daily to R arm x 2 weeks  Dispense: 40 g; Refill: 1    Seborrheic Keratosis  These are benign inherited growths without a malignant potential. Reassurance given to patient. No treatment is necessary.     Lipoma  Reassurance that the lesions are likely lipomas and appear benign.  If it grows or becomes tender, those are good indications for surgical removal            Follow up for Pt will call for follow up after treating both arms with Efudex.

## 2019-10-09 ENCOUNTER — TELEPHONE (OUTPATIENT)
Dept: FAMILY MEDICINE | Facility: CLINIC | Age: 69
End: 2019-10-09

## 2019-10-09 NOTE — TELEPHONE ENCOUNTER
Fax received. Placed in Ry' sign box.      Pharmacy phone: [phone]  Pharmacy fax: [phone]

## 2019-10-09 NOTE — TELEPHONE ENCOUNTER
----- Message from Germaine Vo sent at 10/9/2019 11:55 AM CDT -----  Contact: Patient's wife Crystal  Type: Needs Medical Advice    Who Called:  Crystal  Rod Call Back Number: [phone]  Additional Information: Crystal is calling to inform the office that [facility] in [place] is sending a refill request for her 's colchicine 0.6 mg tablet.Please call back and advise.

## 2019-10-09 NOTE — TELEPHONE ENCOUNTER
Spoke to patient's wife. She says they approve refill for colchicine to to be sent to [facility] in [place]. She says they are sending fax. She did not have fax number.   Staff please check fax so that we can send rx to correct number.

## 2019-10-25 DIAGNOSIS — E78.5 DYSLIPIDEMIA: ICD-10-CM

## 2019-10-25 DIAGNOSIS — I10 ESSENTIAL HYPERTENSION: Chronic | ICD-10-CM

## 2019-10-25 DIAGNOSIS — Z95.5 STATUS POST CORONARY ARTERY STENT PLACEMENT: Chronic | ICD-10-CM

## 2019-10-25 DIAGNOSIS — R00.1 BRADYCARDIA: Chronic | ICD-10-CM

## 2019-10-25 RX ORDER — AMLODIPINE BESYLATE 5 MG/1
TABLET ORAL
Qty: 90 TABLET | Refills: 4 | Status: SHIPPED | OUTPATIENT
Start: 2019-10-25 | End: 2020-08-20 | Stop reason: SDUPTHER

## 2019-12-04 ENCOUNTER — IMMUNIZATION (OUTPATIENT)
Dept: PHARMACY | Facility: CLINIC | Age: 69
End: 2019-12-04
Payer: MEDICARE

## 2019-12-10 ENCOUNTER — OFFICE VISIT (OUTPATIENT)
Dept: FAMILY MEDICINE | Facility: CLINIC | Age: 69
End: 2019-12-10
Payer: MEDICARE

## 2019-12-10 ENCOUNTER — LAB VISIT (OUTPATIENT)
Dept: LAB | Facility: HOSPITAL | Age: 69
End: 2019-12-10
Attending: NURSE PRACTITIONER
Payer: MEDICARE

## 2019-12-10 VITALS
BODY MASS INDEX: 29.51 KG/M2 | HEIGHT: 70 IN | WEIGHT: 206.13 LBS | DIASTOLIC BLOOD PRESSURE: 80 MMHG | HEART RATE: 47 BPM | OXYGEN SATURATION: 97 % | SYSTOLIC BLOOD PRESSURE: 144 MMHG

## 2019-12-10 DIAGNOSIS — Z00.00 ENCOUNTER FOR PREVENTIVE HEALTH EXAMINATION: Primary | ICD-10-CM

## 2019-12-10 DIAGNOSIS — Z12.5 ENCOUNTER FOR SCREENING FOR MALIGNANT NEOPLASM OF PROSTATE: ICD-10-CM

## 2019-12-10 DIAGNOSIS — E78.5 DYSLIPIDEMIA: ICD-10-CM

## 2019-12-10 DIAGNOSIS — R97.20 ELEVATED PSA: ICD-10-CM

## 2019-12-10 DIAGNOSIS — N40.0 BENIGN PROSTATIC HYPERPLASIA, UNSPECIFIED WHETHER LOWER URINARY TRACT SYMPTOMS PRESENT: ICD-10-CM

## 2019-12-10 DIAGNOSIS — I25.10 CORONARY ARTERY DISEASE INVOLVING NATIVE CORONARY ARTERY OF NATIVE HEART WITHOUT ANGINA PECTORIS: Chronic | ICD-10-CM

## 2019-12-10 DIAGNOSIS — I10 ESSENTIAL HYPERTENSION: Chronic | ICD-10-CM

## 2019-12-10 DIAGNOSIS — Z95.5 STATUS POST CORONARY ARTERY STENT PLACEMENT: Chronic | ICD-10-CM

## 2019-12-10 LAB — COMPLEXED PSA SERPL-MCNC: 7 NG/ML (ref 0–4)

## 2019-12-10 PROCEDURE — 3077F SYST BP >= 140 MM HG: CPT | Mod: HCNC,CPTII,S$GLB, | Performed by: NURSE PRACTITIONER

## 2019-12-10 PROCEDURE — 3079F PR MOST RECENT DIASTOLIC BLOOD PRESSURE 80-89 MM HG: ICD-10-PCS | Mod: HCNC,CPTII,S$GLB, | Performed by: NURSE PRACTITIONER

## 2019-12-10 PROCEDURE — G0439 PR MEDICARE ANNUAL WELLNESS SUBSEQUENT VISIT: ICD-10-PCS | Mod: HCNC,S$GLB,, | Performed by: NURSE PRACTITIONER

## 2019-12-10 PROCEDURE — 99999 PR PBB SHADOW E&M-EST. PATIENT-LVL IV: ICD-10-PCS | Mod: PBBFAC,HCNC,, | Performed by: NURSE PRACTITIONER

## 2019-12-10 PROCEDURE — 76706 POCT AORTA SCAN: ICD-10-PCS | Mod: HCNC,S$GLB,, | Performed by: NURSE PRACTITIONER

## 2019-12-10 PROCEDURE — 36415 COLL VENOUS BLD VENIPUNCTURE: CPT | Mod: HCNC,PO

## 2019-12-10 PROCEDURE — 3077F PR MOST RECENT SYSTOLIC BLOOD PRESSURE >= 140 MM HG: ICD-10-PCS | Mod: HCNC,CPTII,S$GLB, | Performed by: NURSE PRACTITIONER

## 2019-12-10 PROCEDURE — 76706 US ABDL AORTA SCREEN AAA: CPT | Mod: HCNC,S$GLB,, | Performed by: NURSE PRACTITIONER

## 2019-12-10 PROCEDURE — 99999 PR PBB SHADOW E&M-EST. PATIENT-LVL IV: CPT | Mod: PBBFAC,HCNC,, | Performed by: NURSE PRACTITIONER

## 2019-12-10 PROCEDURE — 3079F DIAST BP 80-89 MM HG: CPT | Mod: HCNC,CPTII,S$GLB, | Performed by: NURSE PRACTITIONER

## 2019-12-10 PROCEDURE — G0439 PPPS, SUBSEQ VISIT: HCPCS | Mod: HCNC,S$GLB,, | Performed by: NURSE PRACTITIONER

## 2019-12-10 PROCEDURE — 84153 ASSAY OF PSA TOTAL: CPT | Mod: HCNC

## 2019-12-10 NOTE — PROGRESS NOTES
"Alfonso Ewing presented for a  Medicare AWV and comprehensive Health Risk Assessment today. The following components were reviewed and updated:    · Medical history  · Family History  · Social history  · Allergies and Current Medications  · Health Risk Assessment  · Health Maintenance  · Care Team     ** See Completed Assessments for Annual Wellness Visit within the encounter summary.**     The following assessments were completed:  · Living Situation  · CAGE  · Depression Screening  · Timed Get Up and Go  · Whisper Test  · Cognitive Function Screening      · Nutrition Screening  · ADL Screening  · PAQ Screening    Vitals:    12/10/19 1000   BP: (!) 144/80   BP Location: Left arm   Patient Position: Sitting   BP Method: Medium (Manual)   Pulse: (!) 47   SpO2: 97%   Weight: 93.5 kg (206 lb 2.1 oz)   Height: 5' 10" (1.778 m)     Body mass index is 29.58 kg/m².  Physical Exam   Constitutional: He is oriented to person, place, and time. He appears well-nourished.   Cardiovascular: Normal rate, regular rhythm, normal heart sounds and intact distal pulses.   Pulmonary/Chest: Effort normal and breath sounds normal.   Neurological: He is alert and oriented to person, place, and time.   Skin: Skin is warm and dry.   Vitals reviewed.      Diagnoses and health risks identified today and associated recommendations/orders:    1. Encounter for preventive health examination  Reviewed and discussed health maintenance.    - POCT Aorta Scan    2. Elevated PSA  Will need to follow up with urology ()  - PSA, Screening; Future    3. Benign prostatic hyperplasia, unspecified whether lower urinary tract symptoms present  Will need to follow up with urology ()  - PSA, Screening; Future    4. Encounter for screening for malignant neoplasm of prostate   Will need to follow up with urology ()  - PSA, Screening; Future    5. Status post coronary artery stent placement  Stable- continue current treatment and follow " up routinely with PCP   - POCT Aorta Scan    6. Essential hypertension  Elevated but did not take BP medication today. Continue current treatment and follow up routinely with PCP   - POCT Aorta Scan    7. Coronary artery disease involving native coronary artery of native heart without angina pectoris  Stable- continue current treatment and follow up routinely with PCP   - POCT Aorta Scan    8. Dyslipidemia  Stable- continue current treatment and follow up routinely with PCP   - POCT Aorta Scan    Provided Alfonso with a 5-10 year written screening schedule and personal prevention plan. Recommendations were developed using the USPSTF age appropriate recommendations. Education, counseling, and referrals were provided as needed. After Visit Summary printed and given to patient which includes a list of additional screenings\tests needed.    Ruthie Ambrocio NP  I offered to discuss end of life issues, including information on how to make advance directives that the patient could use to name someone who would make medical decisions on their behalf if they became too ill to make themselves.    _X_Patient declined  ___Patient is interested, I provided paper work and offered to discuss.

## 2019-12-10 NOTE — PATIENT INSTRUCTIONS
Counseling and Referral of Other Preventative  (Italic type indicates deductible and co-insurance are waived)    Patient Name: Alfonso Ewing  Today's Date: 12/10/2019    Health Maintenance       Date Due Completion Date    Abdominal Aortic Aneurysm Screening 04/22/2013 ---    High Dose Statin 12/10/2020 12/10/2019    Aspirin/Antiplatelet Therapy 12/10/2020 12/10/2019    Colonoscopy 01/31/2024 1/31/2019    Lipid Panel 07/25/2024 7/25/2019    TETANUS VACCINE 07/13/2028 7/13/2018        No orders of the defined types were placed in this encounter.    The following information is provided to all patients.  This information is to help you find resources for any of the problems found today that may be affecting your health:                Living healthy guide: www.[facility].[place].gov      Understanding Diabetes: www.diabetes.org      Eating healthy: www.cdc.gov/healthyweight      [facility] home safety checklist: www.cdc.gov/steadi/patient.html      Agency on Aging: www.goea.[place].gov      Alcoholics anonymous (AA): www.aa.org      Physical Activity: www.kathya.nih.gov/ie9zjcw      Tobacco use: www.quitwithusla.org

## 2019-12-11 ENCOUNTER — TELEPHONE (OUTPATIENT)
Dept: FAMILY MEDICINE | Facility: CLINIC | Age: 69
End: 2019-12-11

## 2019-12-11 NOTE — TELEPHONE ENCOUNTER
Chart documentation states ok to speak to wife ~ wife notified of PSA results and recommendation. Mrs. Ewing voiced understanding of results and recommendation. Mrs. Ewing states they will call back if they think of any additional questions prior to the upcoming urology appt on 12/27

## 2019-12-11 NOTE — TELEPHONE ENCOUNTER
----- Message from Ruthie Ambrocio NP sent at 12/11/2019  9:43 AM CST -----  Please let patient know his PSA level has increased to 7.0.   Recommend he Keep appt with urology for further evaluation and to discuss treatment options.  Please let me know if he has any questions-ruthie

## 2019-12-27 ENCOUNTER — OFFICE VISIT (OUTPATIENT)
Dept: UROLOGY | Facility: CLINIC | Age: 69
End: 2019-12-27
Payer: MEDICARE

## 2019-12-27 VITALS
DIASTOLIC BLOOD PRESSURE: 86 MMHG | WEIGHT: 206.56 LBS | BODY MASS INDEX: 29.57 KG/M2 | HEART RATE: 52 BPM | HEIGHT: 70 IN | SYSTOLIC BLOOD PRESSURE: 174 MMHG

## 2019-12-27 DIAGNOSIS — R97.20 ELEVATED PSA: Primary | ICD-10-CM

## 2019-12-27 DIAGNOSIS — N40.1 BENIGN PROSTATIC HYPERPLASIA WITH NOCTURIA: ICD-10-CM

## 2019-12-27 DIAGNOSIS — R35.1 BENIGN PROSTATIC HYPERPLASIA WITH NOCTURIA: ICD-10-CM

## 2019-12-27 PROCEDURE — 3079F PR MOST RECENT DIASTOLIC BLOOD PRESSURE 80-89 MM HG: ICD-10-PCS | Mod: HCNC,CPTII,S$GLB, | Performed by: UROLOGY

## 2019-12-27 PROCEDURE — 3077F PR MOST RECENT SYSTOLIC BLOOD PRESSURE >= 140 MM HG: ICD-10-PCS | Mod: HCNC,CPTII,S$GLB, | Performed by: UROLOGY

## 2019-12-27 PROCEDURE — 3077F SYST BP >= 140 MM HG: CPT | Mod: HCNC,CPTII,S$GLB, | Performed by: UROLOGY

## 2019-12-27 PROCEDURE — 99999 PR PBB SHADOW E&M-EST. PATIENT-LVL III: CPT | Mod: PBBFAC,HCNC,, | Performed by: UROLOGY

## 2019-12-27 PROCEDURE — 81002 POCT URINE DIPSTICK WITHOUT MICROSCOPE: ICD-10-PCS | Mod: HCNC,S$GLB,, | Performed by: UROLOGY

## 2019-12-27 PROCEDURE — 1159F PR MEDICATION LIST DOCUMENTED IN MEDICAL RECORD: ICD-10-PCS | Mod: HCNC,S$GLB,, | Performed by: UROLOGY

## 2019-12-27 PROCEDURE — 3079F DIAST BP 80-89 MM HG: CPT | Mod: HCNC,CPTII,S$GLB, | Performed by: UROLOGY

## 2019-12-27 PROCEDURE — 1101F PT FALLS ASSESS-DOCD LE1/YR: CPT | Mod: HCNC,CPTII,S$GLB, | Performed by: UROLOGY

## 2019-12-27 PROCEDURE — 99214 OFFICE O/P EST MOD 30 MIN: CPT | Mod: HCNC,25,S$GLB, | Performed by: UROLOGY

## 2019-12-27 PROCEDURE — 1159F MED LIST DOCD IN RCRD: CPT | Mod: HCNC,S$GLB,, | Performed by: UROLOGY

## 2019-12-27 PROCEDURE — 81002 URINALYSIS NONAUTO W/O SCOPE: CPT | Mod: HCNC,S$GLB,, | Performed by: UROLOGY

## 2019-12-27 PROCEDURE — 99214 PR OFFICE/OUTPT VISIT, EST, LEVL IV, 30-39 MIN: ICD-10-PCS | Mod: HCNC,25,S$GLB, | Performed by: UROLOGY

## 2019-12-27 PROCEDURE — 1101F PR PT FALLS ASSESS DOC 0-1 FALLS W/OUT INJ PAST YR: ICD-10-PCS | Mod: HCNC,CPTII,S$GLB, | Performed by: UROLOGY

## 2019-12-27 PROCEDURE — 1126F AMNT PAIN NOTED NONE PRSNT: CPT | Mod: HCNC,S$GLB,, | Performed by: UROLOGY

## 2019-12-27 PROCEDURE — 99999 PR PBB SHADOW E&M-EST. PATIENT-LVL III: ICD-10-PCS | Mod: PBBFAC,HCNC,, | Performed by: UROLOGY

## 2019-12-27 PROCEDURE — 1126F PR PAIN SEVERITY QUANTIFIED, NO PAIN PRESENT: ICD-10-PCS | Mod: HCNC,S$GLB,, | Performed by: UROLOGY

## 2019-12-27 NOTE — PROGRESS NOTES
[facility]OLOGY [place]  12 27 19     c-c prostate check     Severely hearing impaired. Age 69, psa came back 7 last week, it was 4.7 one year ago. Had 4.2 in 2016.     Had a prostate biopsy in July 2011 by dr hastings and it was negative. His psa was higher in the past, having been 9.7 seven years ago.     Voids with acceptable stream, no hesitancy or need to strain. Has nocturia x 2-3, no burning or pains.     PMH     Surgical:  has a past surgical history that includes Coronary stent placement; Colonoscopy; Cardiac catheterization; Cataract extraction w/  intraocular lens implant (Right, 11/24/2015); and Cataract extraction w/  intraocular lens implant (Left, 1/12/2016).     Medical:  has a past medical history of Allergy; Anticoagulant long-term use; BPH (benign prostatic hyperplasia); BPH (benign prostatic hypertrophy); Bradycardia; Cataract; Coronary artery disease; Dyspnea on exertion; Gout; HEARING LOSS; History of myocardial infarction; Hypertension; Nephrolithiasis; Otitis media; and Sleep apnea.      Familial: brother with prostate ca. father had heart failure, mother had a stroke     Social: , lives in [place], retired, used to work for [employer], did asbestos work                 Current Outpatient Prescriptions on File Prior to Visit   Medication Sig Dispense Refill    amlodipine (NORVASC) 5 MG tablet Take 1 table. 90 tablet 4    aspirin (ECOTRIN) 81  Garrick        clopidogrel (PLAVIX)  Take 1 t. 90 tablet 0    colchicine 0.6 mg tablet 1 da 30 tablet 5    ezetimibe (ZETIA) 10 m   30 tablet 12    finasteride (PROSC  diff DAY-  90 tablet 3    lisinopril-hydrochlorothiazi (PRINZIDE,ZESTORETIC)  Take 1 tablet  90 tablet 5    nitroGLYCERIN (NITROSTAT) 0.4 MG SL Place 1 ta to 25 tablet 12    pravastatin (PRAVACH Take 1  90 tablet 4   ROS:  Denies malaise, headaches, eye symptoms, difficulty swallowing or breathing problems.   No chest pains or palpitations.   No change in bowel habits, no tarry stools or  hematochezia. Rare acid reflux.  No genital lesions.  No bleeding disorders, no seizures.         Pt alert, oriented, no distress  HEENT: wnl.  Neck: supple, no JVD, no lymphadenopathy  Chest: CV NSR  Lungs: normal chest expansion  Abdomen flat, nontender, no organomegaly, no masses.  No hernias  Penis noncircumcised, meatus nl  Testes nl, epi nl, scrotum nl  ANNALEE: anus nl, sphincter nl tone, mucosa without lesions, prostate 40 gm, symmetric, no nodules or indurations  Extremities: no edema, peripheral pulses nl  Neuro: preserved        IMPRESSION:     Bph, on proscar. Is on a daily dose of proscar at this time for the last 8 months, and so the adjusted level of psa would be 14.  Hypoacusia  Has family hx of prostate ca  I recommend trusp c bx

## 2020-01-22 ENCOUNTER — TELEPHONE (OUTPATIENT)
Dept: UROLOGY | Facility: CLINIC | Age: 70
End: 2020-01-22

## 2020-01-22 DIAGNOSIS — R35.1 BENIGN PROSTATIC HYPERPLASIA WITH NOCTURIA: ICD-10-CM

## 2020-01-22 DIAGNOSIS — N40.1 BENIGN PROSTATIC HYPERPLASIA WITH NOCTURIA: ICD-10-CM

## 2020-01-22 DIAGNOSIS — R97.20 ELEVATED PSA: Primary | ICD-10-CM

## 2020-01-22 NOTE — TELEPHONE ENCOUNTER
The patient is scheduled for a prostate biopsy on 2/10/20 with Dr. Thomas.  Can he have Cardiac Clearance and ok to hold ASA x5-7days and Plavix x 3-5 days?

## 2020-01-23 RX ORDER — CIPROFLOXACIN 500 MG/1
500 TABLET ORAL EVERY 12 HOURS
Qty: 6 TABLET | Refills: 0 | Status: SHIPPED | OUTPATIENT
Start: 2020-02-09 | End: 2020-02-12

## 2020-02-07 ENCOUNTER — ANESTHESIA EVENT (OUTPATIENT)
Dept: SURGERY | Facility: HOSPITAL | Age: 70
End: 2020-02-07
Payer: MEDICARE

## 2020-02-10 ENCOUNTER — ANESTHESIA (OUTPATIENT)
Dept: SURGERY | Facility: HOSPITAL | Age: 70
End: 2020-02-10
Payer: MEDICARE

## 2020-02-10 ENCOUNTER — HOSPITAL ENCOUNTER (OUTPATIENT)
Facility: HOSPITAL | Age: 70
Discharge: HOME | End: 2020-02-10
Attending: UROLOGY | Admitting: UROLOGY
Payer: MEDICARE

## 2020-02-10 DIAGNOSIS — R97.20 ELEVATED PSA: ICD-10-CM

## 2020-02-10 PROCEDURE — D9220A PRA ANESTHESIA: Mod: CRNA,,, | Performed by: NURSE ANESTHETIST, CERTIFIED REGISTERED

## 2020-02-10 PROCEDURE — 88305 TISSUE EXAM BY PATHOLOGIST: ICD-10-PCS | Mod: 26,HCNC,, | Performed by: PATHOLOGY

## 2020-02-10 PROCEDURE — 71000033 HC RECOVERY, INTIAL HOUR: Mod: PO | Performed by: UROLOGY

## 2020-02-10 PROCEDURE — 63600175 PHARM REV CODE 636 W HCPCS: Mod: PO | Performed by: ANESTHESIOLOGY

## 2020-02-10 PROCEDURE — 55700 PR BIOPSY OF PROSTATE,NEEDLE/PUNCH: CPT | Mod: HCNC,,, | Performed by: UROLOGY

## 2020-02-10 PROCEDURE — 76872 US TRANSRECTAL: CPT | Mod: 26,HCNC,, | Performed by: UROLOGY

## 2020-02-10 PROCEDURE — D9220A PRA ANESTHESIA: ICD-10-PCS | Mod: ANES,,, | Performed by: ANESTHESIOLOGY

## 2020-02-10 PROCEDURE — D9220A PRA ANESTHESIA: Mod: ANES,,, | Performed by: ANESTHESIOLOGY

## 2020-02-10 PROCEDURE — 37000008 HC ANESTHESIA 1ST 15 MINUTES: Mod: PO | Performed by: UROLOGY

## 2020-02-10 PROCEDURE — 25000003 PHARM REV CODE 250: Mod: PO | Performed by: NURSE ANESTHETIST, CERTIFIED REGISTERED

## 2020-02-10 PROCEDURE — 36000704 HC OR TIME LEV I 1ST 15 MIN: Mod: PO | Performed by: UROLOGY

## 2020-02-10 PROCEDURE — 63600175 PHARM REV CODE 636 W HCPCS: Mod: PO | Performed by: NURSE ANESTHETIST, CERTIFIED REGISTERED

## 2020-02-10 PROCEDURE — 25000003 PHARM REV CODE 250: Mod: PO | Performed by: ANESTHESIOLOGY

## 2020-02-10 PROCEDURE — D9220A PRA ANESTHESIA: ICD-10-PCS | Mod: CRNA,,, | Performed by: NURSE ANESTHETIST, CERTIFIED REGISTERED

## 2020-02-10 PROCEDURE — 25000003 PHARM REV CODE 250: Mod: PO | Performed by: UROLOGY

## 2020-02-10 PROCEDURE — 37000009 HC ANESTHESIA EA ADD 15 MINS: Mod: PO | Performed by: UROLOGY

## 2020-02-10 PROCEDURE — 36000705 HC OR TIME LEV I EA ADD 15 MIN: Mod: PO | Performed by: UROLOGY

## 2020-02-10 PROCEDURE — 88305 TISSUE EXAM BY PATHOLOGIST: CPT | Mod: 26,HCNC,, | Performed by: PATHOLOGY

## 2020-02-10 PROCEDURE — 76872 PR US TRANSRECTAL: ICD-10-PCS | Mod: 26,HCNC,, | Performed by: UROLOGY

## 2020-02-10 PROCEDURE — 88305 TISSUE EXAM BY PATHOLOGIST: CPT | Mod: HCNC | Performed by: PATHOLOGY

## 2020-02-10 PROCEDURE — 55700 PR BIOPSY OF PROSTATE,NEEDLE/PUNCH: ICD-10-PCS | Mod: HCNC,,, | Performed by: UROLOGY

## 2020-02-10 RX ORDER — PROPOFOL 10 MG/ML
INJECTION, EMULSION INTRAVENOUS CONTINUOUS PRN
Status: DISCONTINUED | OUTPATIENT
Start: 2020-02-10 | End: 2020-02-10

## 2020-02-10 RX ORDER — SODIUM CHLORIDE, SODIUM LACTATE, POTASSIUM CHLORIDE, CALCIUM CHLORIDE 600; 310; 30; 20 MG/100ML; MG/100ML; MG/100ML; MG/100ML
INJECTION, SOLUTION INTRAVENOUS CONTINUOUS
Status: DISCONTINUED | OUTPATIENT
Start: 2020-02-10 | End: 2020-02-10 | Stop reason: HOSPADM

## 2020-02-10 RX ORDER — LIDOCAINE HYDROCHLORIDE 10 MG/ML
1 INJECTION, SOLUTION EPIDURAL; INFILTRATION; INTRACAUDAL; PERINEURAL ONCE
Status: COMPLETED | OUTPATIENT
Start: 2020-02-10 | End: 2020-02-10

## 2020-02-10 RX ORDER — DEXAMETHASONE SODIUM PHOSPHATE 4 MG/ML
INJECTION, SOLUTION INTRA-ARTICULAR; INTRALESIONAL; INTRAMUSCULAR; INTRAVENOUS; SOFT TISSUE
Status: DISCONTINUED | OUTPATIENT
Start: 2020-02-10 | End: 2020-02-10

## 2020-02-10 RX ORDER — ACETAMINOPHEN 10 MG/ML
INJECTION, SOLUTION INTRAVENOUS
Status: DISCONTINUED | OUTPATIENT
Start: 2020-02-10 | End: 2020-02-10

## 2020-02-10 RX ORDER — LIDOCAINE HCL/PF 100 MG/5ML
SYRINGE (ML) INTRAVENOUS
Status: DISCONTINUED | OUTPATIENT
Start: 2020-02-10 | End: 2020-02-10

## 2020-02-10 RX ORDER — ACETAMINOPHEN 325 MG/1
325 TABLET ORAL EVERY 4 HOURS PRN
Status: DISCONTINUED | OUTPATIENT
Start: 2020-02-10 | End: 2020-02-10 | Stop reason: HOSPADM

## 2020-02-10 RX ORDER — MIDAZOLAM HYDROCHLORIDE 1 MG/ML
INJECTION, SOLUTION INTRAMUSCULAR; INTRAVENOUS
Status: DISCONTINUED | OUTPATIENT
Start: 2020-02-10 | End: 2020-02-10

## 2020-02-10 RX ORDER — GLYCOPYRROLATE 0.2 MG/ML
INJECTION INTRAMUSCULAR; INTRAVENOUS
Status: DISCONTINUED | OUTPATIENT
Start: 2020-02-10 | End: 2020-02-10

## 2020-02-10 RX ORDER — HYDROCODONE BITARTRATE AND ACETAMINOPHEN 5; 325 MG/1; MG/1
1 TABLET ORAL EVERY 4 HOURS PRN
Status: DISCONTINUED | OUTPATIENT
Start: 2020-02-10 | End: 2020-02-10 | Stop reason: HOSPADM

## 2020-02-10 RX ORDER — CIPROFLOXACIN 2 MG/ML
INJECTION, SOLUTION INTRAVENOUS
Status: DISCONTINUED | OUTPATIENT
Start: 2020-02-10 | End: 2020-02-10

## 2020-02-10 RX ORDER — PROPOFOL 10 MG/ML
INJECTION, EMULSION INTRAVENOUS
Status: DISCONTINUED | OUTPATIENT
Start: 2020-02-10 | End: 2020-02-10

## 2020-02-10 RX ORDER — ONDANSETRON 8 MG/1
8 TABLET, ORALLY DISINTEGRATING ORAL EVERY 8 HOURS PRN
Status: DISCONTINUED | OUTPATIENT
Start: 2020-02-10 | End: 2020-02-10 | Stop reason: HOSPADM

## 2020-02-10 RX ORDER — FENTANYL CITRATE 50 UG/ML
INJECTION, SOLUTION INTRAMUSCULAR; INTRAVENOUS
Status: DISCONTINUED | OUTPATIENT
Start: 2020-02-10 | End: 2020-02-10

## 2020-02-10 RX ORDER — LIDOCAINE HYDROCHLORIDE 10 MG/ML
INJECTION, SOLUTION EPIDURAL; INFILTRATION; INTRACAUDAL; PERINEURAL
Status: DISCONTINUED | OUTPATIENT
Start: 2020-02-10 | End: 2020-02-10 | Stop reason: HOSPADM

## 2020-02-10 RX ORDER — ONDANSETRON 2 MG/ML
INJECTION INTRAMUSCULAR; INTRAVENOUS
Status: DISCONTINUED | OUTPATIENT
Start: 2020-02-10 | End: 2020-02-10

## 2020-02-10 RX ORDER — KETOROLAC TROMETHAMINE 30 MG/ML
INJECTION, SOLUTION INTRAMUSCULAR; INTRAVENOUS
Status: DISCONTINUED | OUTPATIENT
Start: 2020-02-10 | End: 2020-02-10

## 2020-02-10 RX ADMIN — LIDOCAINE HYDROCHLORIDE 10 MG: 10 INJECTION, SOLUTION EPIDURAL; INFILTRATION; INTRACAUDAL; PERINEURAL at 08:02

## 2020-02-10 RX ADMIN — CIPROFLOXACIN 400 MG: 2 INJECTION, SOLUTION INTRAVENOUS at 08:02

## 2020-02-10 RX ADMIN — ACETAMINOPHEN 1000 MG: 10 INJECTION, SOLUTION INTRAVENOUS at 09:02

## 2020-02-10 RX ADMIN — SODIUM CHLORIDE, SODIUM LACTATE, POTASSIUM CHLORIDE, AND CALCIUM CHLORIDE: .6; .31; .03; .02 INJECTION, SOLUTION INTRAVENOUS at 08:02

## 2020-02-10 RX ADMIN — PROPOFOL 50 MG: 10 INJECTION, EMULSION INTRAVENOUS at 08:02

## 2020-02-10 RX ADMIN — LIDOCAINE HYDROCHLORIDE 100 MG: 20 INJECTION PARENTERAL at 08:02

## 2020-02-10 RX ADMIN — PROPOFOL 50 MCG/KG/MIN: 10 INJECTION, EMULSION INTRAVENOUS at 08:02

## 2020-02-10 RX ADMIN — GLYCOPYRROLATE 0.2 MG: 0.2 INJECTION, SOLUTION INTRAMUSCULAR; INTRAVENOUS at 08:02

## 2020-02-10 RX ADMIN — ONDANSETRON 4 MG: 2 INJECTION, SOLUTION INTRAMUSCULAR; INTRAVENOUS at 08:02

## 2020-02-10 RX ADMIN — MIDAZOLAM HYDROCHLORIDE 1 MG: 1 INJECTION, SOLUTION INTRAMUSCULAR; INTRAVENOUS at 08:02

## 2020-02-10 RX ADMIN — KETOROLAC TROMETHAMINE 30 MG: 30 INJECTION, SOLUTION INTRAMUSCULAR; INTRAVENOUS at 09:02

## 2020-02-10 RX ADMIN — DEXAMETHASONE SODIUM PHOSPHATE 8 MG: 4 INJECTION, SOLUTION INTRAMUSCULAR; INTRAVENOUS at 09:02

## 2020-02-10 RX ADMIN — FENTANYL CITRATE 25 MCG: 50 INJECTION, SOLUTION INTRAMUSCULAR; INTRAVENOUS at 08:02

## 2020-02-10 NOTE — ANESTHESIA POSTPROCEDURE EVALUATION
Anesthesia Post Evaluation    Patient: Alfonso Ewing    Procedure(s) Performed: Procedure(s) (LRB):  BIOPSY, PROSTATE, RECTAL APPROACH, WITH US GUIDANCE (Bilateral)    Final Anesthesia Type: general    Patient location during evaluation: PACU  Patient participation: Yes- Able to Participate  Level of consciousness: awake and alert  Post-procedure vital signs: reviewed and stable  Pain management: adequate  Airway patency: patent    PONV status at discharge: No PONV  Anesthetic complications: no      Cardiovascular status: blood pressure returned to baseline and hemodynamically stable  Respiratory status: unassisted  Hydration status: euvolemic  Follow-up not needed.          Vitals Value Taken Time   /55 2/10/2020  9:28 AM   Temp 36.4 °C (97.5 °F) 2/10/2020  9:28 AM   Pulse 55 2/10/2020  9:28 AM   Resp 12 2/10/2020  9:28 AM   SpO2 96 % 2/10/2020  9:28 AM         No case tracking events are documented in the log.      Pain/Kofi Score: Kofi Score: 8 (2/10/2020  9:28 AM)

## 2020-02-10 NOTE — TRANSFER OF CARE
"Anesthesia Transfer of Care Note    Patient: Alfonso Ewing    Procedure(s) Performed: Procedure(s) (LRB):  BIOPSY, PROSTATE, RECTAL APPROACH, WITH US GUIDANCE (Bilateral)    Patient location: PACU    Anesthesia Type: general    Transport from OR: Transported from OR on room air with adequate spontaneous ventilation    Post pain: adequate analgesia    Post assessment: no apparent anesthetic complications and tolerated procedure well    Post vital signs: stable    Level of consciousness: sedated and responds to stimulation    Nausea/Vomiting: no nausea/vomiting    Complications: none    Transfer of care protocol was followed      Last vitals:   Visit Vitals  BP (!) 174/77 (BP Location: Right arm, Patient Position: Lying)   Pulse (!) 48   Temp 36.2 °C (97.2 °F) (Skin)   Resp 16   Ht 5' 10" (1.778 m)   Wt 93 kg (205 lb)   SpO2 98%   BMI 29.41 kg/m²     "

## 2020-02-10 NOTE — PLAN OF CARE
VSS. Tolerated PO fluids. Discharge instructions given and explained. Patient reports no pain. Patient ambulated to bathroom with assistance, able to void blood tinged urine. Patient requesting to be discharged home.

## 2020-02-10 NOTE — ANESTHESIA PREPROCEDURE EVALUATION
02/10/2020  Alfonso Ewing is a 69 y.o., male.    Anesthesia Evaluation    I have reviewed the Patient Summary Reports.    I have reviewed the Nursing Notes.      Review of Systems  Anesthesia Hx:  No problems with previous Anesthesia    Cardiovascular:   Hypertension, well controlled CAD asymptomatic CABG/stent  CAD with stents.  Stable at present.  Hx of  bradycardia during colonoscopy last year that had to be treated   Pulmonary:   Sleep Apnea, CPAP        Physical Exam  General:  Obesity    Airway/Jaw/Neck:  Airway Findings: Mallampati: II                Anesthesia Plan  Type of Anesthesia, risks & benefits discussed:  Anesthesia Type:  general  Patient's Preference:   Intra-op Monitoring Plan:   Intra-op Monitoring Plan Comments:   Post Op Pain Control Plan:   Post Op Pain Control Plan Comments:   Induction:   IV  Beta Blocker:  Patient is not currently on a Beta-Blocker (No further documentation required).       Informed Consent: Patient understands risks and agrees with Anesthesia plan.  Questions answered. Anesthesia consent signed with patient.  ASA Score: 3     Day of Surgery Review of History & Physical:    H&P update referred to the surgeon.         Ready For Surgery From Anesthesia Perspective.

## 2020-02-10 NOTE — H&P
ASC for elective procedure  Urology 2 10 20     Severely hearing impaired. Age 69, psa came back 7 last week, but since he is on proscar it would be the equivalent of 14. He also has family hx of prostate ca.      Had a prostate biopsy in July 2011 by dr hastings and it was negative.      [facility]     Surgical:  has a past surgical history that includes Coronary stent placement; Colonoscopy; Cardiac catheterization; Cataract extraction w/  intraocular lens implant (Right, 11/24/2015); and Cataract extraction w/  intraocular lens implant (Left, 1/12/2016).     Medical:  has a past medical history of Allergy; Anticoagulant long-term use; BPH (benign prostatic hyperplasia); BPH (benign prostatic hypertrophy); Bradycardia; Cataract; Coronary artery disease; Dyspnea on exertion; Gout; HEARING LOSS; History of myocardial infarction; Hypertension; Nephrolithiasis; Otitis media; and Sleep apnea.      Familial: brother with prostate ca. father had heart failure, mother had a stroke     Social: , lives in [place], retired, used to work for [employer], did asbestos work                 Current Outpatient Prescriptions on File Prior to Visit   Medication Sig Dispense Refill    amlodipine (NORVASC) 5 MG tablet Take 1 table. 90 tablet 4    aspirin (ECOTRIN) 81  Garrick        clopidogrel (PLAVIX)  Take 1 t. 90 tablet 0    colchicine 0.6 mg tablet 1 da 30 tablet 5    ezetimibe (ZETIA) 10 m   30 tablet 12    finasteride (PROSC  diff DAY-  90 tablet 3    lisinopril-hydrochlorothiazi (PRINZIDE,ZESTORETIC)  Take 1 tablet  90 tablet 5    nitroGLYCERIN (NITROSTAT) 0.4 MG SL Place 1 ta to 25 tablet 12    pravastatin (PRAVACH Take 1  90 tablet 4   ROS:  Denies malaise, headaches, eye symptoms, difficulty swallowing or breathing problems.   No chest pains or palpitations.   No change in bowel habits, no tarry stools or hematochezia. Rare acid reflux.  No genital lesions.  No bleeding disorders, no seizures.         Pt alert, oriented, no  distress  HEENT: wnl.  Neck: supple, no JVD, no lymphadenopathy  Chest: CV NSR  Lungs: normal chest expansion  Abdomen flat, nontender, no organomegaly, no masses.  No hernias  Penis noncircumcised, meatus nl  Testes nl, epi nl, scrotum nl  ANNALEE: anus nl, sphincter nl tone, mucosa without lesions, prostate 40 gm, symmetric, no nodules or indurations  Extremities: no edema, peripheral pulses nl  Neuro: preserved        IMPRESSION:     Bph, on proscar.  Elevated psa  Hypoacusia  Has family hx of prostate ca  Will have trusp c bx

## 2020-02-10 NOTE — DISCHARGE INSTRUCTIONS
PROSTATE BIOPSY      DOS:   Minimal activity for 24 hours.   May shower or bathe today.   Advance diet as tolerated.   Drink a lot of liquids until you see your doctor.   Expect blood in urine/stool for up to 3 weeks.   Expect blood in semen for up to 8 weeks.   Resume home medications as prescribed   Biopsy results may not be available for 10-14 days    DONT:   No driving for 24 hours or while taking narcotic pain medication   No aspirin, NSAIDS or blood thinners for 7 days   No sexual intercourse, heavy lifting, or strenuous activity for 7 days.   DO NOT TAKE ADDITIONAL TYLENOL/ACETAMINOPHEN WHILE TAKING NARCOTIC PAIN MEDICATION THAT CONTAINS TYLENOL/ACETAMINOPHEN.    CALL PHYSICIAN FOR:   Unable to urinate within 6 hours after surgery.   Excessive bleeding.    Fever>101   Persistent pain not relieved by pain medication.    Contact your physician for emergencies at [phone]         Discharge Instructions: After Your Surgery  Youve just had surgery. During surgery, you were given medicine called anesthesia to keep you relaxed and free of pain. After surgery, you may have some pain or nausea. This is common. Here are some tips for feeling better and getting well after surgery.     Stay on schedule with your medicine.   Going home  Your healthcare provider will show you how to take care of yourself when you go home. He or she will also answer your questions. Have an adult family member or friend drive you home. For the first 24 hours after your surgery:  · Do not drive or use heavy equipment.  · Do not make important decisions or sign legal papers.  · Do not drink alcohol.  · Have someone stay with you, if needed. He or she can watch for problems and help keep you safe.  Be sure to go to all follow-up visits with your healthcare provider. And rest after your surgery for as long as your healthcare provider tells you to.  Coping with pain  If you have pain after surgery, pain medicine will help  you feel better. Take it as told, before pain becomes severe. Also, ask your healthcare provider or pharmacist about other ways to control pain. This might be with heat, ice, or relaxation. And follow any other instructions your surgeon or nurse gives you.  Tips for taking pain medicine  To get the best relief possible, remember these points:  · Pain medicines can upset your stomach. Taking them with a little food may help.  · Most pain relievers taken by mouth need at least 20 to 30 minutes to start to work.  · Taking medicine on a schedule can help you remember to take it. Try to time your medicine so that you can take it before starting an activity. This might be before you get dressed, go for a walk, or sit down for dinner.  · Constipation is a common side effect of pain medicines. Call your healthcare provider before taking any medicines such as laxatives or stool softeners to help ease constipation. Also ask if you should skip any foods. Drinking lots of fluids and eating foods such as fruits and vegetables that are high in fiber can also help. Remember, do not take laxatives unless your surgeon has prescribed them.  · Drinking alcohol and taking pain medicine can cause dizziness and slow your breathing. It can even be deadly. Do not drink alcohol while taking pain medicine.  · Pain medicine can make you react more slowly to things. Do not drive or run machinery while taking pain medicine.  Your healthcare provider may tell you to take acetaminophen to help ease your pain. Ask him or her how much you are supposed to take each day. Acetaminophen or other pain relievers may interact with your prescription medicines or other over-the-counter (OTC) medicines. Some prescription medicines have acetaminophen and other ingredients. Using both prescription and OTC acetaminophen for pain can cause you to overdose. Read the labels on your OTC medicines with care. This will help you to clearly know the list of  ingredients, how much to take, and any warnings. It may also help you not take too much acetaminophen. If you have questions or do not understand the information, ask your pharmacist or healthcare provider to explain it to you before you take the OTC medicine.  Managing nausea  Some people have an upset stomach after surgery. This is often because of anesthesia, pain, or pain medicine, or the stress of surgery. These tips will help you handle nausea and eat healthy foods as you get better. If you were on a special food plan before surgery, ask your healthcare provider if you should follow it while you get better. These tips may help:  · Do not push yourself to eat. Your body will tell you when to eat and how much.  · Start off with clear liquids and soup. They are easier to digest.  · Next try semi-solid foods, such as mashed potatoes, applesauce, and gelatin, as you feel ready.  · Slowly move to solid foods. Dont eat fatty, rich, or spicy foods at first.  · Do not force yourself to have 3 large meals a day. Instead eat smaller amounts more often.  · Take pain medicines with a small amount of solid food, such as crackers or toast, to avoid nausea.     Call your surgeon if  · You still have pain an hour after taking medicine. The medicine may not be strong enough.  · You feel too sleepy, dizzy, or groggy. The medicine may be too strong.  · You have side effects like nausea, vomiting, or skin changes, such as rash, itching, or hives.       If you have obstructive sleep apnea  You were given anesthesia medicine during surgery to keep you comfortable and free of pain. After surgery, you may have more apnea spells because of this medicine and other medicines you were given. The spells may last longer than usual.   At home:  · Keep using the continuous positive airway pressure (CPAP) device when you sleep. Unless your healthcare provider tells you not to, use it when you sleep, day or night. CPAP is a common device used  to treat obstructive sleep apnea.  · Talk with your provider before taking any pain medicine, muscle relaxants, or sedatives. Your provider will tell you about the possible dangers of taking these medicines.  Date Last Reviewed: 12/1/2016 © 2000-2017 [email or web address]. [address] [place] [address]. All rights reserved. This information is not intended as a substitute for professional medical care. Always follow your healthcare professional's instructions.

## 2020-02-10 NOTE — OP NOTE
Site: [facility], [place]  Date 2 10 20  Surgeon: William  Anesthesia: iv sedation and local infiltration xylocaine  Preop diagnosis: elevated psa  Postop diagnosis: elevated psa  Procedure: TRANSRECTAL ULTRASOUND OF PROSTATE WITH NEEDLE BIOPSIES. ULTRASOUND GUIDANCE FOR NEEDLE BIOPSIES  Complications: none  EBL: None    Description of the procedure:  Patient took a prophylactic antibiotic starting last night. He also gave himself a Fleet enema 2 hours prior to the procedure. Pt was taken to the operating room. After satisfactory sedation anesthesia, pt received iv cipro 400 mg iv. He was placed in the L lateral decubitus position. We instilled a 10-ml enema of pure Betadine solution in the rectal ampulla and waited a few minutes.   The rectal probe of the [employer] S-nerve ultrasound machine with 5-to-8 Hz frequency was inserted and multiple transverse and longitudinal scans were done.     Prostate measurements:  Gland volume: 21.4  cm3  Gland width (transverse): 4.14 cm  Gland height (anteroposterior): 2.38 cm  Gland length (cephalocaudal): 3.76 cm      The general shape of the prostate was symmetric. Several small calcific densities were seen, as well as various small cystic lesions. No definite hypoechoic areas were seen.   We performed a prostate block (pudendal block) with a total of 10 ml of xylocaine applied at the prostate base and prostate apex on both sides. Multiple biopsies were obtained using the [employer] Magnum Biopsy Needle in the 22 mm length setting. We used the sextant topographic technique to distribute and send the biopsy samples. The patient tolerated the procedure well. He was transferred to Recovery Room.    Patient was warned about possible complications, such as persistent hematuria, hematochezia, hematospermia, infection and sepsis. He is to continue with the antibiotic given until finished. Drink abundant fluids. Call in one week for pathology results

## 2020-02-11 VITALS
HEIGHT: 70 IN | OXYGEN SATURATION: 98 % | DIASTOLIC BLOOD PRESSURE: 67 MMHG | RESPIRATION RATE: 10 BRPM | SYSTOLIC BLOOD PRESSURE: 146 MMHG | WEIGHT: 205 LBS | TEMPERATURE: 98 F | HEART RATE: 45 BPM | BODY MASS INDEX: 29.35 KG/M2

## 2020-02-21 ENCOUNTER — TELEPHONE (OUTPATIENT)
Dept: UROLOGY | Facility: CLINIC | Age: 70
End: 2020-02-21

## 2020-02-21 NOTE — TELEPHONE ENCOUNTER
----- Message from Camille Gonzalez sent at 2/21/2020  9:14 AM CST -----  Contact: patient spouse ashanti [phone]  Type:  Sooner Apoointment Request    Caller is requesting a sooner appointment.  Caller is requesting a message be sent to doctor.    Name of Caller:  patient spouse ashanti   When is the first available appointment? 3/24/2020  Symptoms:  Follow up from biopsy   Best Call Back Number:  [phone]

## 2020-02-21 NOTE — TELEPHONE ENCOUNTER
Advised we would call as soon as pathology comes back and is reviewed. We would advise if follow up is needed. Pt wife expressed understanding.

## 2020-03-09 LAB — FINAL PATHOLOGIC DIAGNOSIS: NORMAL

## 2020-03-11 ENCOUNTER — TELEPHONE (OUTPATIENT)
Dept: UROLOGY | Facility: CLINIC | Age: 70
End: 2020-03-11

## 2020-03-11 DIAGNOSIS — E29.1 MALE HYPOGONADISM: Primary | ICD-10-CM

## 2020-03-11 NOTE — TELEPHONE ENCOUNTER
----- Message from Jesi Mcghee sent at 3/11/2020 11:12 AM CDT -----  Type: Needs Medical Advice    Who Called:  Wife/Crystal Velasco Call Back Number: [phone] (home)     Additional Information: Calling to talk to office concerning previous surgery. Please call to advise.

## 2020-03-19 NOTE — TELEPHONE ENCOUNTER
I called pt and wife about the results of the prostate biopsy, with 4 sextants positive for adenocarcinoma of prostate. The tri was 6 in all of them, and the involvement of the tissue was from 5% to 20%. psa was 7 in dec 2019, which adjusted for use of proscar would be 14.   I recommended treatment for this malignancy, either radiotherapy at [facility] or robotic prostatectomy.   The couple pointed out that the pt is hard of hearing and they prefer to come in person for this discussion.   I accepted their request.

## 2020-03-24 ENCOUNTER — OFFICE VISIT (OUTPATIENT)
Dept: UROLOGY | Facility: CLINIC | Age: 70
End: 2020-03-24
Payer: MEDICARE

## 2020-03-24 VITALS
HEART RATE: 51 BPM | SYSTOLIC BLOOD PRESSURE: 156 MMHG | BODY MASS INDEX: 29.61 KG/M2 | DIASTOLIC BLOOD PRESSURE: 73 MMHG | TEMPERATURE: 98 F | HEIGHT: 70 IN | WEIGHT: 206.81 LBS

## 2020-03-24 DIAGNOSIS — N13.8 BENIGN PROSTATIC HYPERPLASIA WITH URINARY OBSTRUCTION: ICD-10-CM

## 2020-03-24 DIAGNOSIS — D49.59 NEOPLASM OF PROSTATE: ICD-10-CM

## 2020-03-24 DIAGNOSIS — N40.1 BENIGN PROSTATIC HYPERPLASIA WITH URINARY OBSTRUCTION: ICD-10-CM

## 2020-03-24 PROCEDURE — 3077F PR MOST RECENT SYSTOLIC BLOOD PRESSURE >= 140 MM HG: ICD-10-PCS | Mod: HCNC,CPTII,S$GLB, | Performed by: UROLOGY

## 2020-03-24 PROCEDURE — 1101F PT FALLS ASSESS-DOCD LE1/YR: CPT | Mod: HCNC,CPTII,S$GLB, | Performed by: UROLOGY

## 2020-03-24 PROCEDURE — 1101F PR PT FALLS ASSESS DOC 0-1 FALLS W/OUT INJ PAST YR: ICD-10-PCS | Mod: HCNC,CPTII,S$GLB, | Performed by: UROLOGY

## 2020-03-24 PROCEDURE — 99215 PR OFFICE/OUTPT VISIT, EST, LEVL V, 40-54 MIN: ICD-10-PCS | Mod: HCNC,S$GLB,, | Performed by: UROLOGY

## 2020-03-24 PROCEDURE — 3078F PR MOST RECENT DIASTOLIC BLOOD PRESSURE < 80 MM HG: ICD-10-PCS | Mod: HCNC,CPTII,S$GLB, | Performed by: UROLOGY

## 2020-03-24 PROCEDURE — 3078F DIAST BP <80 MM HG: CPT | Mod: HCNC,CPTII,S$GLB, | Performed by: UROLOGY

## 2020-03-24 PROCEDURE — 99999 PR PBB SHADOW E&M-EST. PATIENT-LVL III: ICD-10-PCS | Mod: PBBFAC,HCNC,, | Performed by: UROLOGY

## 2020-03-24 PROCEDURE — 1126F AMNT PAIN NOTED NONE PRSNT: CPT | Mod: HCNC,S$GLB,, | Performed by: UROLOGY

## 2020-03-24 PROCEDURE — 1159F PR MEDICATION LIST DOCUMENTED IN MEDICAL RECORD: ICD-10-PCS | Mod: HCNC,S$GLB,, | Performed by: UROLOGY

## 2020-03-24 PROCEDURE — 99499 UNLISTED E&M SERVICE: CPT | Mod: HCNC,S$GLB,, | Performed by: UROLOGY

## 2020-03-24 PROCEDURE — 3077F SYST BP >= 140 MM HG: CPT | Mod: HCNC,CPTII,S$GLB, | Performed by: UROLOGY

## 2020-03-24 PROCEDURE — 1126F PR PAIN SEVERITY QUANTIFIED, NO PAIN PRESENT: ICD-10-PCS | Mod: HCNC,S$GLB,, | Performed by: UROLOGY

## 2020-03-24 PROCEDURE — 99499 RISK ADDL DX/OHS AUDIT: ICD-10-PCS | Mod: HCNC,S$GLB,, | Performed by: UROLOGY

## 2020-03-24 PROCEDURE — 99999 PR PBB SHADOW E&M-EST. PATIENT-LVL III: CPT | Mod: PBBFAC,HCNC,, | Performed by: UROLOGY

## 2020-03-24 PROCEDURE — 1159F MED LIST DOCD IN RCRD: CPT | Mod: HCNC,S$GLB,, | Performed by: UROLOGY

## 2020-03-24 PROCEDURE — 99215 OFFICE O/P EST HI 40 MIN: CPT | Mod: HCNC,S$GLB,, | Performed by: UROLOGY

## 2020-03-24 RX ORDER — FINASTERIDE 5 MG/1
5 TABLET, FILM COATED ORAL DAILY
Qty: 90 TABLET | Refills: 3 | Status: ON HOLD | OUTPATIENT
Start: 2020-03-24 | End: 2020-08-01 | Stop reason: HOSPADM

## 2020-03-24 NOTE — PROGRESS NOTES
UROLOGY [place]  3 24 20    Cc cancer talk    Age 70, has been followed since 2011, for elevated psa. Had a trusp c bx in July 2011, negative. psa continued fluctuant. It was 5.69 in jan 2012, 9.70 later that year, it was 7 in dec 2019. His prostate never showed any nodules or indurations during examination. Voids with acceptable stream, no hesitancy or need to strain. Has nocturia x 2-3, no burning or pains. Pt has been on proscar for many years.    In feb 2010 he underwent trusp c bx:    A.  PROSTATE, RIGHT BASE: - Benign prostatic parenchyma with inflammation    B.  PROSTATE, RIGHT MID,  Acinar adenocarcinoma. ([place]'s Score 3+3=6).        TUMOR QUANTITATION:   Number core positive:  1            Total number of cores:  4            Estimated percentage of prostatic tissue involved by tumor:  5%            Total linear millimeters of carcinoma:  3 mm            Total linear millimeters of needle core tissue:  39 mm        PERINEURAL INVASION:  Not identified.   (C61.)     C.  PROSTATE, RIGHT APEX, NEEDLE CORE BIOPSY: HISTOLOGIC TYPE:  Acinar   adenocarcinoma. ([place]'s Score 3+3=6).        TUMOR QUANTITATION:   Number core positive:  2            Total number of cores:  2            Estimated percentage of prostatic tissue involved by tumor:  20%            Total linear millimeters of carcinoma:  9 mm            Total linear millimeters of needle core tissue:  28 mm        PERINEURAL INVASION:  Not identified.     D.  PROSTATE, LEFT BASE: - Benign prostatic tissue.     E.  PROSTATE, LEFT MID:  Acinar adenocarcinoma. ([place]'s Score 3+3=6).        TUMOR QUANTITATION:   Number core positive:  1            Total number of cores:  2            Estimated percentage of prostatic tissue involved by tumor:  20%            Total linear millimeters of carcinoma:  3 mm            Total linear millimeters of needle core tissue:  23 mm        PERINEURAL INVASION:  Not identified.        ADDITIONAL PATHOLOGIC FINDINGS:   High grade prostatic intraepithelial   neoplasia (HGPIN)    F.  PROSTATE, LEFT APEX  Acinar adenocarcinoma.  ([place]'s Score 3+3=6).        TUMOR QUANTITATION:   Number core positive:  1            Total number of cores:  2            Estimated percentage of prostatic tissue involved by tumor:  10%            Total linear millimeters of carcinoma:  3 mm            Total linear millimeters of needle core tissue:  28 mm        PERINEURAL INVASION:  Not identified.      IMP  cT1c cN0 cM0 tri 7 (3+4) in four sextants, with involvement varying between 5 and 20 % of the tissue. psa is 14 when adjusted for proscar.   We discused diagnosis, staging and grading of prostate ca. We discussed treatment modalities, including surgery and external beam radiotherapy. Pt was interested in the fact that salvage radiation is possible after surgery, but the reverse is not valid. He therefore prefers having surgery first, and in case there is a recurrence, radiation therapy could be arranged then.     I would like to have an abdominal and pelvic CT scan with and without contrasts and also a bone scan as metastatic workup of this case. We would then refer to dr tobi abdi for an interview on robotic prostatectomy.     Since wife could not participate in the interview owing to the coronavirus pandemic, I then called her on her cell phone Cell wife [phone], and had a conversation with her about the situation. This is the second telephone conversation I have with wife about 's malignancy. I answered all questions and pt and wive satisfied.      Counseled 60 min  Over 50% of time in counseling

## 2020-05-06 ENCOUNTER — TELEPHONE (OUTPATIENT)
Dept: UROLOGY | Facility: CLINIC | Age: 70
End: 2020-05-06

## 2020-05-07 ENCOUNTER — TELEPHONE (OUTPATIENT)
Dept: UROLOGY | Facility: CLINIC | Age: 70
End: 2020-05-07

## 2020-05-07 DIAGNOSIS — D49.59 NEOPLASM OF PROSTATE: Primary | ICD-10-CM

## 2020-05-08 ENCOUNTER — HOSPITAL ENCOUNTER (OUTPATIENT)
Dept: RADIOLOGY | Facility: HOSPITAL | Age: 70
Discharge: HOME | End: 2020-05-08
Attending: UROLOGY
Payer: MEDICARE

## 2020-05-08 DIAGNOSIS — D49.59 NEOPLASM OF PROSTATE: ICD-10-CM

## 2020-05-08 PROCEDURE — 74177 CT ABDOMEN PELVIS WITH CONTRAST: ICD-10-PCS | Mod: 26,,, | Performed by: RADIOLOGY

## 2020-05-08 PROCEDURE — 25500020 PHARM REV CODE 255: Mod: PO | Performed by: UROLOGY

## 2020-05-08 PROCEDURE — 78306 BONE IMAGING WHOLE BODY: CPT | Mod: 26,,, | Performed by: RADIOLOGY

## 2020-05-08 PROCEDURE — 74177 CT ABD & PELVIS W/CONTRAST: CPT | Mod: TC,PO

## 2020-05-08 PROCEDURE — 74177 CT ABD & PELVIS W/CONTRAST: CPT | Mod: 26,,, | Performed by: RADIOLOGY

## 2020-05-08 PROCEDURE — A9503 TC99M MEDRONATE: HCPCS | Mod: PO

## 2020-05-08 PROCEDURE — 78306 NM BONE SCAN WHOLE BODY: ICD-10-PCS | Mod: 26,,, | Performed by: RADIOLOGY

## 2020-05-08 RX ADMIN — IOHEXOL 100 ML: 350 INJECTION, SOLUTION INTRAVENOUS at 01:05

## 2020-05-08 RX ADMIN — IOHEXOL 1000 ML: 12 SOLUTION ORAL at 01:05

## 2020-05-13 ENCOUNTER — OFFICE VISIT (OUTPATIENT)
Dept: UROLOGY | Facility: CLINIC | Age: 70
End: 2020-05-13
Payer: MEDICARE

## 2020-05-13 VITALS
DIASTOLIC BLOOD PRESSURE: 71 MMHG | SYSTOLIC BLOOD PRESSURE: 143 MMHG | WEIGHT: 204.81 LBS | HEIGHT: 70 IN | HEART RATE: 51 BPM | BODY MASS INDEX: 29.32 KG/M2

## 2020-05-13 DIAGNOSIS — C61 PROSTATE CANCER: ICD-10-CM

## 2020-05-13 PROCEDURE — 3077F SYST BP >= 140 MM HG: CPT | Mod: CPTII,S$GLB,, | Performed by: UROLOGY

## 2020-05-13 PROCEDURE — 99999 PR PBB SHADOW E&M-EST. PATIENT-LVL III: CPT | Mod: PBBFAC,,, | Performed by: UROLOGY

## 2020-05-13 PROCEDURE — 99214 OFFICE O/P EST MOD 30 MIN: CPT | Mod: S$GLB,,, | Performed by: UROLOGY

## 2020-05-13 PROCEDURE — 1101F PR PT FALLS ASSESS DOC 0-1 FALLS W/OUT INJ PAST YR: ICD-10-PCS | Mod: CPTII,S$GLB,, | Performed by: UROLOGY

## 2020-05-13 PROCEDURE — 3077F PR MOST RECENT SYSTOLIC BLOOD PRESSURE >= 140 MM HG: ICD-10-PCS | Mod: CPTII,S$GLB,, | Performed by: UROLOGY

## 2020-05-13 PROCEDURE — 1101F PT FALLS ASSESS-DOCD LE1/YR: CPT | Mod: CPTII,S$GLB,, | Performed by: UROLOGY

## 2020-05-13 PROCEDURE — 3078F DIAST BP <80 MM HG: CPT | Mod: CPTII,S$GLB,, | Performed by: UROLOGY

## 2020-05-13 PROCEDURE — 1159F MED LIST DOCD IN RCRD: CPT | Mod: S$GLB,,, | Performed by: UROLOGY

## 2020-05-13 PROCEDURE — 1159F PR MEDICATION LIST DOCUMENTED IN MEDICAL RECORD: ICD-10-PCS | Mod: S$GLB,,, | Performed by: UROLOGY

## 2020-05-13 PROCEDURE — 1126F AMNT PAIN NOTED NONE PRSNT: CPT | Mod: S$GLB,,, | Performed by: UROLOGY

## 2020-05-13 PROCEDURE — 3078F PR MOST RECENT DIASTOLIC BLOOD PRESSURE < 80 MM HG: ICD-10-PCS | Mod: CPTII,S$GLB,, | Performed by: UROLOGY

## 2020-05-13 PROCEDURE — 99214 PR OFFICE/OUTPT VISIT, EST, LEVL IV, 30-39 MIN: ICD-10-PCS | Mod: S$GLB,,, | Performed by: UROLOGY

## 2020-05-13 PROCEDURE — 1126F PR PAIN SEVERITY QUANTIFIED, NO PAIN PRESENT: ICD-10-PCS | Mod: S$GLB,,, | Performed by: UROLOGY

## 2020-05-13 PROCEDURE — 99999 PR PBB SHADOW E&M-EST. PATIENT-LVL III: ICD-10-PCS | Mod: PBBFAC,,, | Performed by: UROLOGY

## 2020-05-13 NOTE — PROGRESS NOTES
Subjective:       Patient ID: Alfonso Ewing is a 70 y.o. male.    Chief Complaint: Consult (Prostate talk)    Patient diagnosed with prostate cancer.  He wants to further discuss options, including robotic prostatectomy.  Some mild to moderate LUTS  No other issues    Past Medical History:   Diagnosis Date    Allergy     Anticoagulant long-term use     ASA 81 mg, Plavix    BPH (benign prostatic hyperplasia)     BPH (benign prostatic hypertrophy)     Bradycardia     chronic since 2012    Bradycardia 6/21/2012    CAD (coronary artery disease) 2/13/2012 07/11/2011 mid LAD, 80% stenosis; mid LCX, 60% stenosis; mid RCA, 80%       stenosis;                                                                   11/14/2000 LAD, luminal irregularities; LCX, luminal irregularities;        RCA, occluded;                                                                  Cataract     ou done//    Coronary artery disease     2 stents    Dyslipidemia 2/13/2012    hx increased LFT while on niaspan an  vytorin. Myalgia with lipitor, zocor.     Dyspnea on exertion     stable, chronic    Essential hypertension 2/13/2012    Gout     HEARING LOSS     SEVERE -  No hearing aids anymore    History of myocardial infarction 2000    Hypertension     Nephrolithiasis 1980    passed it on his own    Otitis media     Sleep apnea     use cpap    Status post coronary artery stent placement 6/21/2012                                        Previous PCI:                                                               S/P coated stent to LAD, 07/29/2011                                         S/P coated stent to RCA, 07/29/2011                                         S/P stent to RCA, 11/14/2000            Past Surgical History:   Procedure Laterality Date    CARDIAC CATHETERIZATION      2 stents    CATARACT EXTRACTION W/  INTRAOCULAR LENS IMPLANT Right 11/24/2015    By Dr Byrd    CATARACT EXTRACTION W/  INTRAOCULAR LENS IMPLANT  Left 1/12/2016    MatthiasllPicacho    COLONOSCOPY      COLONOSCOPY N/A 1/31/2019    Procedure: COLONOSCOPY;  Surgeon: Evan Basurto MD;  Location: [facility] ENDO;  Service: Endoscopy;  Laterality: N/A;    CORONARY STENT PLACEMENT      x 2    TRANSRECTAL BIOPSY OF PROSTATE WITH ULTRASOUND GUIDANCE Bilateral 2/10/2020    Procedure: BIOPSY, PROSTATE, RECTAL APPROACH, WITH US GUIDANCE;  Surgeon: Tavares Thomas MD;  Location: [facility] OR;  Service: Urology;  Laterality: Bilateral;     Social History     Socioeconomic History    Marital status:      Spouse name: Not on file    Number of children: 2    Years of education: Not on file    Highest education level: Not on file   Occupational History    Occupation: retired retired     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     Comment:    Social Needs    Financial resource strain: Not on file    Food insecurity:     Worry: Not on file     Inability: Not on file    Transportation needs:     Medical: Not on file     Non-medical: Not on file   Tobacco Use    Smoking status: Never Smoker    Smokeless tobacco: Former User     Types: Snuff    Tobacco comment: dipped tobacco   Substance and Sexual Activity    Alcohol use: No    Drug use: No    Sexual activity: Yes     Partners: Female   Lifestyle    Physical activity:     Days per week: Not on file     Minutes per session: Not on file    Stress: Not on file   Relationships    Social connections:     Talks on phone: Not on file     Gets together: Not on file     Attends Sabianist service: Not on file     Active member of club or organization: Not on file     Attends meetings of clubs or organizations: Not on file     Relationship status: Not on file   Other Topics Concern    Not on file   Social History Narrative    Not on file       Family History   Problem Relation Age of Onset    Stroke Mother     Kidney disease Mother     Glaucoma Mother     Heart failure Father     Heart  disease Father     Hypertension Father     Hyperlipidemia Father     Glaucoma Brother     Diabetes Brother     Heart disease Brother     Hyperlipidemia Brother     Prostate cancer Brother     Cancer Sister     Hypertension Son     Heart disease Brother     Diabetes Brother     Hyperlipidemia Brother     Heart disease Brother     Hyperlipidemia Brother     Heart disease Brother     Hyperlipidemia Brother     Hypertension Brother     Hypertension Brother     Hyperlipidemia Brother     Heart disease Sister     Hypertension Sister     Hyperlipidemia Sister     Heart disease Sister     Hypertension Sister     Hyperlipidemia Sister     Heart disease Sister     Hypertension Sister     Hyperlipidemia Sister     Prostate cancer Sister     Amblyopia Neg Hx     Blindness Neg Hx     Cataracts Neg Hx     Macular degeneration Neg Hx     Retinal detachment Neg Hx     Strabismus Neg Hx     Thyroid disease Neg Hx       Review of patient's allergies indicates:   Allergen Reactions    Niacin Rash    Promethazine Nausea Only    Statins-hmg-coa reductase inhibitors Other (See Comments)     Other reaction(s): Muscle pain     Medication List with Changes/Refills   Current Medications    AMLODIPINE (NORVASC) 5 MG TABLET    TAKE 1 TABLET(5 MG) BY MOUTH EVERY EVENING    ASPIRIN (ECOTRIN) 81 MG EC TABLET    Take 81 mg by mouth every evening.     CLOPIDOGREL (PLAVIX) 75 MG TABLET    Take 1 tablet (75 mg total) by mouth once daily.    COLCHICINE 0.6 MG TABLET    1 tab now and 1 at bedtime, then 1 tab daily.    EZETIMIBE (ZETIA) 10 MG TABLET    Take 1 tablet (10 mg total) by mouth once daily.    FINASTERIDE (PROSCAR) 5 MG TABLET    Take 1 tablet (5 mg total) by mouth once daily.    FLUOROURACIL (EFUDEX) 5 % CREAM    Apply topically 2 (two) times daily. To L arm x 2 weeks; then twice daily to R arm x 2 weeks    NITROGLYCERIN (NITROSTAT) 0.4 MG SL TABLET    Place 1 tablet (0.4 mg total) under the tongue every  5 (five) minutes as needed for Chest pain. As directed PRN    PRAVASTATIN (PRAVACHOL) 20 MG TABLET    Take 1 tablet (20 mg total) by mouth every evening.    VALSARTAN-HYDROCHLOROTHIAZIDE (DIOVAN-HCT) 160-12.5 MG PER TABLET    Take 1 tablet by mouth once daily.      Review of Systems   Constitutional: Negative.    HENT: Negative.    Eyes: Negative.    Respiratory: Negative.    Cardiovascular: Negative.    Gastrointestinal: Negative.    Endocrine: Negative.    Genitourinary: Positive for frequency and urgency.   Musculoskeletal: Negative.    Allergic/Immunologic: Negative.    Neurological: Negative.    Hematological: Negative.    Psychiatric/Behavioral: Negative.        Objective:      Physical Exam   Nursing note and vitals reviewed.  Constitutional: He is oriented to person, place, and time. He appears well-developed and well-nourished.   HENT:   Head: Normocephalic and atraumatic.   Eyes: EOM are normal. Pupils are equal, round, and reactive to light.   Neck: Normal range of motion. Neck supple.   Cardiovascular: Normal rate.    Pulmonary/Chest: Effort normal.   Abdominal: Soft.   Genitourinary: Penis normal.   Genitourinary Comments: Prostate firm without nodules   Musculoskeletal: Normal range of motion.   Neurological: He is alert and oriented to person, place, and time.   Skin: Skin is warm and dry.     Psychiatric: He has a normal mood and affect. His behavior is normal. Judgment and thought content normal.         Sodium   Date Value Ref Range Status   07/25/2019 142 136 - 145 mmol/L Final     Potassium   Date Value Ref Range Status   07/25/2019 5.0 3.5 - 5.1 mmol/L Final     Chloride   Date Value Ref Range Status   07/25/2019 103 95 - 110 mmol/L Final     CO2   Date Value Ref Range Status   07/25/2019 29 23 - 29 mmol/L Final     Creatinine   Date Value Ref Range Status   05/08/2020 1.4 0.5 - 1.4 mg/dL Final     Glucose   Date Value Ref Range Status   07/25/2019 107 70 - 110 mg/dL Final     AST   Date Value  "Ref Range Status   07/25/2019 31 10 - 40 U/L Final     ALT   Date Value Ref Range Status   07/25/2019 32 10 - 44 U/L Final     WBC   Date Value Ref Range Status   07/25/2019 6.54 3.90 - 12.70 K/uL Final     Hemoglobin   Date Value Ref Range Status   07/25/2019 15.7 14.0 - 18.0 g/dL Final     Hematocrit   Date Value Ref Range Status   07/25/2019 47.7 40.0 - 54.0 % Final     Platelets   Date Value Ref Range Status   07/25/2019 186 150 - 350 K/uL Final     INR   Date Value Ref Range Status   07/11/2011 1.0 0.8 - 1.2 Final     Comment:     ACCP Guideline for Coumadin usage recommends a "target range" of  2.0 - 3.0 for INR for all indicators except mechanical heart valves  and antiphospholipid syndromes which should use 2.5 - 3.5.  .     PSA, SCREEN   Date Value Ref Range Status   12/10/2019 7.0 (H) 0.00 - 4.00 ng/mL Final     Comment:     PSA Expected levels:  Hormonal Therapy: <0.05 ng/ml  Prostatectomy: <0.01 ng/ml  Radiation Therapy: <1.00 ng/ml            Assessment/Plan: High volume prostate cancer ([place] 3+3)                                  All options including XRT, RALP, and observation discused                                  Will plan R[facility] at [facility]       Problem List Items Addressed This Visit     None          "

## 2020-05-21 ENCOUNTER — TELEPHONE (OUTPATIENT)
Dept: CARDIOLOGY | Facility: CLINIC | Age: 70
End: 2020-05-21

## 2020-05-21 NOTE — TELEPHONE ENCOUNTER
----- Message from Rick Mcclain sent at 5/21/2020 12:42 PM CDT -----  Contact: Wife/Crystal Rojas called in and wanted to see if patients appointment that is now on 8/10/2020 can be moved to mid July because patient has surgery on 7/31/2020.    Crystal's call back number is [phone]

## 2020-05-25 ENCOUNTER — TELEPHONE (OUTPATIENT)
Dept: CARDIOLOGY | Facility: CLINIC | Age: 70
End: 2020-05-25

## 2020-05-27 PROBLEM — C61 PROSTATE CANCER: Status: ACTIVE | Noted: 2020-05-27

## 2020-06-10 ENCOUNTER — HOSPITAL ENCOUNTER (OUTPATIENT)
Dept: RADIOLOGY | Facility: HOSPITAL | Age: 70
Discharge: HOME | End: 2020-06-10
Attending: INTERNAL MEDICINE
Payer: MEDICARE

## 2020-06-10 DIAGNOSIS — N18.30 CKD (CHRONIC KIDNEY DISEASE) STAGE 3, GFR 30-59 ML/MIN: ICD-10-CM

## 2020-06-10 PROCEDURE — 76770 US RETROPERITONEAL COMPLETE: ICD-10-PCS | Mod: 26,HCNC,, | Performed by: RADIOLOGY

## 2020-06-10 PROCEDURE — 76770 US EXAM ABDO BACK WALL COMP: CPT | Mod: TC,HCNC,PO

## 2020-06-10 PROCEDURE — 76770 US EXAM ABDO BACK WALL COMP: CPT | Mod: 26,HCNC,, | Performed by: RADIOLOGY

## 2020-06-30 RX ORDER — VALSARTAN AND HYDROCHLOROTHIAZIDE 160; 12.5 MG/1; MG/1
1 TABLET, FILM COATED ORAL DAILY
Qty: 90 TABLET | Refills: 4 | Status: SHIPPED | OUTPATIENT
Start: 2020-06-30 | End: 2020-08-20 | Stop reason: SDUPTHER

## 2020-07-15 ENCOUNTER — OFFICE VISIT (OUTPATIENT)
Dept: NEPHROLOGY | Facility: CLINIC | Age: 70
End: 2020-07-15
Payer: MEDICARE

## 2020-07-15 VITALS
HEIGHT: 70 IN | DIASTOLIC BLOOD PRESSURE: 72 MMHG | WEIGHT: 206.38 LBS | HEART RATE: 56 BPM | SYSTOLIC BLOOD PRESSURE: 132 MMHG | BODY MASS INDEX: 29.54 KG/M2 | TEMPERATURE: 97 F

## 2020-07-15 DIAGNOSIS — N18.30 CKD (CHRONIC KIDNEY DISEASE) STAGE 3, GFR 30-59 ML/MIN: Primary | ICD-10-CM

## 2020-07-15 DIAGNOSIS — I10 ESSENTIAL HYPERTENSION: ICD-10-CM

## 2020-07-15 DIAGNOSIS — N28.1 ACQUIRED CYST OF KIDNEY: ICD-10-CM

## 2020-07-15 PROCEDURE — 1159F MED LIST DOCD IN RCRD: CPT | Mod: HCNC,S$GLB,, | Performed by: INTERNAL MEDICINE

## 2020-07-15 PROCEDURE — 99999 PR PBB SHADOW E&M-EST. PATIENT-LVL III: ICD-10-PCS | Mod: PBBFAC,HCNC,, | Performed by: INTERNAL MEDICINE

## 2020-07-15 PROCEDURE — 3078F DIAST BP <80 MM HG: CPT | Mod: HCNC,CPTII,S$GLB, | Performed by: INTERNAL MEDICINE

## 2020-07-15 PROCEDURE — 1159F PR MEDICATION LIST DOCUMENTED IN MEDICAL RECORD: ICD-10-PCS | Mod: HCNC,S$GLB,, | Performed by: INTERNAL MEDICINE

## 2020-07-15 PROCEDURE — 99499 UNLISTED E&M SERVICE: CPT | Mod: HCNC,S$GLB,, | Performed by: INTERNAL MEDICINE

## 2020-07-15 PROCEDURE — 3008F PR BODY MASS INDEX (BMI) DOCUMENTED: ICD-10-PCS | Mod: HCNC,CPTII,S$GLB, | Performed by: INTERNAL MEDICINE

## 2020-07-15 PROCEDURE — 99499 RISK ADDL DX/OHS AUDIT: ICD-10-PCS | Mod: HCNC,S$GLB,, | Performed by: INTERNAL MEDICINE

## 2020-07-15 PROCEDURE — 1101F PT FALLS ASSESS-DOCD LE1/YR: CPT | Mod: HCNC,CPTII,S$GLB, | Performed by: INTERNAL MEDICINE

## 2020-07-15 PROCEDURE — 1101F PR PT FALLS ASSESS DOC 0-1 FALLS W/OUT INJ PAST YR: ICD-10-PCS | Mod: HCNC,CPTII,S$GLB, | Performed by: INTERNAL MEDICINE

## 2020-07-15 PROCEDURE — 3075F PR MOST RECENT SYSTOLIC BLOOD PRESS GE 130-139MM HG: ICD-10-PCS | Mod: HCNC,CPTII,S$GLB, | Performed by: INTERNAL MEDICINE

## 2020-07-15 PROCEDURE — 3008F BODY MASS INDEX DOCD: CPT | Mod: HCNC,CPTII,S$GLB, | Performed by: INTERNAL MEDICINE

## 2020-07-15 PROCEDURE — 99214 PR OFFICE/OUTPT VISIT, EST, LEVL IV, 30-39 MIN: ICD-10-PCS | Mod: HCNC,S$GLB,, | Performed by: INTERNAL MEDICINE

## 2020-07-15 PROCEDURE — 3078F PR MOST RECENT DIASTOLIC BLOOD PRESSURE < 80 MM HG: ICD-10-PCS | Mod: HCNC,CPTII,S$GLB, | Performed by: INTERNAL MEDICINE

## 2020-07-15 PROCEDURE — 3075F SYST BP GE 130 - 139MM HG: CPT | Mod: HCNC,CPTII,S$GLB, | Performed by: INTERNAL MEDICINE

## 2020-07-15 PROCEDURE — 99999 PR PBB SHADOW E&M-EST. PATIENT-LVL III: CPT | Mod: PBBFAC,HCNC,, | Performed by: INTERNAL MEDICINE

## 2020-07-15 PROCEDURE — 99214 OFFICE O/P EST MOD 30 MIN: CPT | Mod: HCNC,S$GLB,, | Performed by: INTERNAL MEDICINE

## 2020-07-15 NOTE — PROGRESS NOTES
"Subjective:       Patient ID: Alfonso Ewing is a 70 y.o. White male who presents for return patient evaluation for chronic renal failure.    He has prostate cancer and will have surgery with Dr. Mckeon at the end of the month.  He has no uremic or urinary symptoms and is in his usual state of health.  There have been no recent illnesses, hospitalizations or procedures.  He is to get a hearing aid evaluation soon.      Review of Systems   Constitutional: Negative for appetite change, chills and fever.   HENT: Positive for hearing loss.         Dry mouth   Eyes: Negative for visual disturbance.   Respiratory: Negative for cough and shortness of breath.    Cardiovascular: Negative for chest pain and leg swelling.        Bradycardia   Gastrointestinal: Negative for diarrhea, nausea and vomiting.   Genitourinary: Negative for difficulty urinating, dysuria and hematuria.   Musculoskeletal: Negative for myalgias.   Skin: Negative for rash.   Neurological: Negative for headaches.   Psychiatric/Behavioral: Negative for sleep disturbance.       The past medical, family and social histories were reviewed for this encounter.     /72   Pulse (!) 56   Temp 97.3 °F (36.3 °C)   Ht 5' 10" (1.778 m)   Wt 93.6 kg (206 lb 5.6 oz)   BMI 29.61 kg/m²     Objective:      Physical Exam  Vitals signs reviewed.   Constitutional:       General: He is not in acute distress.     Appearance: He is well-developed.   HENT:      Head: Normocephalic and atraumatic.   Eyes:      Conjunctiva/sclera: Conjunctivae normal.   Neck:      Musculoskeletal: Neck supple.      Vascular: No JVD.   Cardiovascular:      Rate and Rhythm: Normal rate and regular rhythm.      Heart sounds: Normal heart sounds. No murmur. No friction rub. No gallop.    Pulmonary:      Effort: Pulmonary effort is normal. No respiratory distress.      Breath sounds: Normal breath sounds. No wheezing or rales.   Abdominal:      General: Bowel sounds are normal. There is no " distension.      Palpations: Abdomen is soft.      Tenderness: There is no abdominal tenderness.   Skin:     General: Skin is warm and dry.      Findings: No rash.   Neurological:      Mental Status: He is alert and oriented to person, place, and time.         Assessment:       1. CKD (chronic kidney disease) stage 3, GFR 30-59 ml/min    2. Essential hypertension        Plan:   Return to clinic in 6 months.  Labs for next visit include rp, pth, ua.  Baseline creatinine is 1.3-1.5 since 2004.  PTH is 108 with a calcium of 9.9.   Renal US shows R 10.7 cm L 10.3 cm.  Blood pressure is controlled on the current regimen.  Continue current medications as prescribed and reviewed.

## 2020-07-22 ENCOUNTER — TELEPHONE (OUTPATIENT)
Dept: FAMILY MEDICINE | Facility: CLINIC | Age: 70
End: 2020-07-22

## 2020-07-24 ENCOUNTER — TELEPHONE (OUTPATIENT)
Dept: UROLOGY | Facility: CLINIC | Age: 70
End: 2020-07-24

## 2020-07-24 ENCOUNTER — TELEPHONE (OUTPATIENT)
Dept: CARDIOLOGY | Facility: CLINIC | Age: 70
End: 2020-07-24

## 2020-07-24 NOTE — TELEPHONE ENCOUNTER
Patient having robotic prostatectomy with Dr. Mckeon on Friday 7/31. Can he have cardiac clearance and ok to stop Plavix for 5-7 days?

## 2020-07-27 ENCOUNTER — LAB VISIT (OUTPATIENT)
Dept: LAB | Facility: HOSPITAL | Age: 70
End: 2020-07-27
Attending: INTERNAL MEDICINE
Payer: MEDICARE

## 2020-07-27 DIAGNOSIS — Z95.5 STATUS POST CORONARY ARTERY STENT PLACEMENT: Chronic | ICD-10-CM

## 2020-07-27 DIAGNOSIS — I25.10 CORONARY ARTERY DISEASE INVOLVING NATIVE CORONARY ARTERY OF NATIVE HEART WITHOUT ANGINA PECTORIS: Chronic | ICD-10-CM

## 2020-07-27 DIAGNOSIS — I10 ESSENTIAL HYPERTENSION: Chronic | ICD-10-CM

## 2020-07-27 DIAGNOSIS — E78.5 DYSLIPIDEMIA: ICD-10-CM

## 2020-07-27 PROCEDURE — 36415 COLL VENOUS BLD VENIPUNCTURE: CPT | Mod: HCNC,PO

## 2020-07-27 PROCEDURE — 80053 COMPREHEN METABOLIC PANEL: CPT | Mod: HCNC

## 2020-07-27 PROCEDURE — 80061 LIPID PANEL: CPT | Mod: HCNC

## 2020-07-28 LAB
ALBUMIN SERPL BCP-MCNC: 4.1 G/DL (ref 3.5–5.2)
ALP SERPL-CCNC: 45 U/L (ref 55–135)
ALT SERPL W/O P-5'-P-CCNC: 32 U/L (ref 10–44)
ANION GAP SERPL CALC-SCNC: 12 MMOL/L (ref 8–16)
AST SERPL-CCNC: 25 U/L (ref 10–40)
BILIRUB SERPL-MCNC: 0.5 MG/DL (ref 0.1–1)
BUN SERPL-MCNC: 16 MG/DL (ref 8–23)
CALCIUM SERPL-MCNC: 10 MG/DL (ref 8.7–10.5)
CHLORIDE SERPL-SCNC: 103 MMOL/L (ref 95–110)
CHOLEST SERPL-MCNC: 199 MG/DL (ref 120–199)
CHOLEST/HDLC SERPL: 4.9 {RATIO} (ref 2–5)
CO2 SERPL-SCNC: 27 MMOL/L (ref 23–29)
CREAT SERPL-MCNC: 1.4 MG/DL (ref 0.5–1.4)
EST. GFR  (AFRICAN AMERICAN): 57.6 ML/MIN/1.73 M^2
EST. GFR  (NON AFRICAN AMERICAN): 49.8 ML/MIN/1.73 M^2
GLUCOSE SERPL-MCNC: 102 MG/DL (ref 70–110)
HDLC SERPL-MCNC: 41 MG/DL (ref 40–75)
HDLC SERPL: 20.6 % (ref 20–50)
LDLC SERPL CALC-MCNC: 80.6 MG/DL (ref 63–159)
NONHDLC SERPL-MCNC: 158 MG/DL
POTASSIUM SERPL-SCNC: 4.5 MMOL/L (ref 3.5–5.1)
PROT SERPL-MCNC: 7.3 G/DL (ref 6–8.4)
SODIUM SERPL-SCNC: 142 MMOL/L (ref 136–145)
TRIGL SERPL-MCNC: 387 MG/DL (ref 30–150)

## 2020-08-07 DIAGNOSIS — E78.5 DYSLIPIDEMIA: ICD-10-CM

## 2020-08-07 DIAGNOSIS — I10 ESSENTIAL HYPERTENSION: Chronic | ICD-10-CM

## 2020-08-07 DIAGNOSIS — R00.1 BRADYCARDIA: Chronic | ICD-10-CM

## 2020-08-07 DIAGNOSIS — Z95.5 STATUS POST CORONARY ARTERY STENT PLACEMENT: Chronic | ICD-10-CM

## 2020-08-07 RX ORDER — EZETIMIBE 10 MG/1
10 TABLET ORAL DAILY
Qty: 90 TABLET | Refills: 4 | Status: SHIPPED | OUTPATIENT
Start: 2020-08-07 | End: 2020-08-20 | Stop reason: SDUPTHER

## 2020-08-07 NOTE — TELEPHONE ENCOUNTER
----- Message from Bharti Mendiola sent at 8/7/2020  9:35 AM CDT -----  Regarding: Refill  Contact: [phone]  Type:  RX Refill Request    Who Called:  Crystal   Refill or New Rx:  Refill  RX Name and Strength:  ezetimibe (ZETIA) 10 mg tablet  How is the patient currently taking it? (ex. 1XDay):  ...  Is this a 30 day or 90 day RX:  30 day   Preferred Pharmacy with phone number:      [facility]  Phne: [phone]  Fax: [phone]      Local or Mail Order:  local   Ordering Provider:  Gissell Velasco Call Back Number:  [phone]

## 2020-08-07 NOTE — TELEPHONE ENCOUNTER
----- Message from Bharti Mendiola sent at 8/7/2020  9:33 AM CDT -----  Regarding: Pt Message  Contact: [phone]  Type: Needs Medical Advice  Who Called:  Crystal  Rod Call Back Number: [phone]  Additional Information: Patient wife is requesting a call back in regards to patient's appt after patient had surgery. Please and thank you

## 2020-08-20 ENCOUNTER — OFFICE VISIT (OUTPATIENT)
Dept: CARDIOLOGY | Facility: CLINIC | Age: 70
End: 2020-08-20
Payer: MEDICARE

## 2020-08-20 VITALS
BODY MASS INDEX: 29.78 KG/M2 | HEART RATE: 47 BPM | HEIGHT: 69 IN | SYSTOLIC BLOOD PRESSURE: 147 MMHG | DIASTOLIC BLOOD PRESSURE: 70 MMHG | WEIGHT: 201.06 LBS

## 2020-08-20 DIAGNOSIS — R00.1 BRADYCARDIA: Chronic | ICD-10-CM

## 2020-08-20 DIAGNOSIS — E78.5 DYSLIPIDEMIA: ICD-10-CM

## 2020-08-20 DIAGNOSIS — I10 ESSENTIAL HYPERTENSION: Chronic | ICD-10-CM

## 2020-08-20 DIAGNOSIS — Z95.5 STATUS POST CORONARY ARTERY STENT PLACEMENT: Chronic | ICD-10-CM

## 2020-08-20 PROCEDURE — 1159F PR MEDICATION LIST DOCUMENTED IN MEDICAL RECORD: ICD-10-PCS | Mod: HCNC,S$GLB,, | Performed by: PHYSICIAN ASSISTANT

## 2020-08-20 PROCEDURE — 3078F PR MOST RECENT DIASTOLIC BLOOD PRESSURE < 80 MM HG: ICD-10-PCS | Mod: HCNC,CPTII,S$GLB, | Performed by: PHYSICIAN ASSISTANT

## 2020-08-20 PROCEDURE — 1126F PR PAIN SEVERITY QUANTIFIED, NO PAIN PRESENT: ICD-10-PCS | Mod: HCNC,S$GLB,, | Performed by: PHYSICIAN ASSISTANT

## 2020-08-20 PROCEDURE — 3008F BODY MASS INDEX DOCD: CPT | Mod: HCNC,CPTII,S$GLB, | Performed by: PHYSICIAN ASSISTANT

## 2020-08-20 PROCEDURE — 1101F PR PT FALLS ASSESS DOC 0-1 FALLS W/OUT INJ PAST YR: ICD-10-PCS | Mod: HCNC,CPTII,S$GLB, | Performed by: PHYSICIAN ASSISTANT

## 2020-08-20 PROCEDURE — 1126F AMNT PAIN NOTED NONE PRSNT: CPT | Mod: HCNC,S$GLB,, | Performed by: PHYSICIAN ASSISTANT

## 2020-08-20 PROCEDURE — 99999 PR PBB SHADOW E&M-EST. PATIENT-LVL III: CPT | Mod: PBBFAC,HCNC,, | Performed by: PHYSICIAN ASSISTANT

## 2020-08-20 PROCEDURE — 3078F DIAST BP <80 MM HG: CPT | Mod: HCNC,CPTII,S$GLB, | Performed by: PHYSICIAN ASSISTANT

## 2020-08-20 PROCEDURE — 99999 PR PBB SHADOW E&M-EST. PATIENT-LVL III: ICD-10-PCS | Mod: PBBFAC,HCNC,, | Performed by: PHYSICIAN ASSISTANT

## 2020-08-20 PROCEDURE — 1159F MED LIST DOCD IN RCRD: CPT | Mod: HCNC,S$GLB,, | Performed by: PHYSICIAN ASSISTANT

## 2020-08-20 PROCEDURE — 3077F SYST BP >= 140 MM HG: CPT | Mod: HCNC,CPTII,S$GLB, | Performed by: PHYSICIAN ASSISTANT

## 2020-08-20 PROCEDURE — 99214 OFFICE O/P EST MOD 30 MIN: CPT | Mod: HCNC,S$GLB,, | Performed by: PHYSICIAN ASSISTANT

## 2020-08-20 PROCEDURE — 3008F PR BODY MASS INDEX (BMI) DOCUMENTED: ICD-10-PCS | Mod: HCNC,CPTII,S$GLB, | Performed by: PHYSICIAN ASSISTANT

## 2020-08-20 PROCEDURE — 3077F PR MOST RECENT SYSTOLIC BLOOD PRESSURE >= 140 MM HG: ICD-10-PCS | Mod: HCNC,CPTII,S$GLB, | Performed by: PHYSICIAN ASSISTANT

## 2020-08-20 PROCEDURE — 99214 PR OFFICE/OUTPT VISIT, EST, LEVL IV, 30-39 MIN: ICD-10-PCS | Mod: HCNC,S$GLB,, | Performed by: PHYSICIAN ASSISTANT

## 2020-08-20 PROCEDURE — 1101F PT FALLS ASSESS-DOCD LE1/YR: CPT | Mod: HCNC,CPTII,S$GLB, | Performed by: PHYSICIAN ASSISTANT

## 2020-08-20 RX ORDER — PRAVASTATIN SODIUM 20 MG/1
20 TABLET ORAL NIGHTLY
Qty: 90 TABLET | Refills: 4 | Status: SHIPPED | OUTPATIENT
Start: 2020-08-20 | End: 2021-01-27 | Stop reason: SDUPTHER

## 2020-08-20 RX ORDER — EZETIMIBE 10 MG/1
10 TABLET ORAL DAILY
Qty: 90 TABLET | Refills: 4 | Status: SHIPPED | OUTPATIENT
Start: 2020-08-20 | End: 2021-10-21

## 2020-08-20 RX ORDER — AMLODIPINE BESYLATE 5 MG/1
5 TABLET ORAL NIGHTLY
Qty: 90 TABLET | Refills: 4 | Status: SHIPPED | OUTPATIENT
Start: 2020-08-20 | End: 2021-10-18

## 2020-08-20 RX ORDER — VALSARTAN AND HYDROCHLOROTHIAZIDE 160; 12.5 MG/1; MG/1
1 TABLET, FILM COATED ORAL DAILY
Qty: 90 TABLET | Refills: 4 | Status: SHIPPED | OUTPATIENT
Start: 2020-08-20 | End: 2020-09-25

## 2020-08-20 RX ORDER — CLOPIDOGREL BISULFATE 75 MG/1
75 TABLET ORAL DAILY
Qty: 90 TABLET | Refills: 4 | Status: SHIPPED | OUTPATIENT
Start: 2020-08-20 | End: 2020-10-13

## 2020-08-20 RX ORDER — PRAVASTATIN SODIUM 20 MG/1
TABLET ORAL
Qty: 90 TABLET | Refills: 4 | Status: SHIPPED | OUTPATIENT
Start: 2020-08-20 | End: 2020-08-20 | Stop reason: SDUPTHER

## 2020-08-20 NOTE — PROGRESS NOTES
"Subjective:    Patient ID:  Alfonso Ewing is a 70 y.o. male who presents for follow-up of CAD.       HPI  Mr. Ewing is a very pleasant gentleman who follows with Dr. Borrero.  He presents today for annual follow up. He is without cardiovascular complaints. He just had a robotic-assisted laparoscopic radical prostatectomy, pelvic lymphadenectomy and urethropexy on 7/31. He had no post-op complications.     We discussed his recent labs. He admits he has not bee exercising routinely as he has not been able to go to the gym .  He denies any CP, ESTRADA, or change in his exercise tolerance.     Review of Systems   Constitution: Negative for chills, diaphoresis, fever, weight gain and weight loss.   HENT: Negative for sore throat.    Eyes: Negative for blurred vision, vision loss in left eye, vision loss in right eye and visual disturbance.   Cardiovascular: Negative for chest pain, claudication, dyspnea on exertion, leg swelling, near-syncope, orthopnea, palpitations, paroxysmal nocturnal dyspnea and syncope.   Respiratory: Negative for cough, hemoptysis, shortness of breath, sputum production and wheezing.    Endocrine: Negative for cold intolerance and heat intolerance.   Hematologic/Lymphatic: Negative for adenopathy. Does not bruise/bleed easily.   Skin: Negative for rash.   Musculoskeletal: Negative for falls, muscle weakness and myalgias.   Gastrointestinal: Negative for abdominal pain, change in bowel habit, constipation, diarrhea, melena and nausea.   Genitourinary: Negative for bladder incontinence.   Neurological: Negative for dizziness, focal weakness, headaches, light-headedness, numbness and weakness.   Psychiatric/Behavioral: Negative for altered mental status.         Vitals:    08/20/20 1034   BP: (!) 147/70   BP Location: Left arm   Patient Position: Sitting   BP Method: Large (Automatic)   Pulse: (!) 47   Weight: 91.2 kg (201 lb 1 oz)   Height: 5' 9" (1.753 m)   Body mass index is 29.69 kg/m².    Objective:    " Physical Exam   Constitutional: He is oriented to person, place, and time. He appears well-developed and well-nourished.   HENT:   Head: Normocephalic and atraumatic.   Eyes: Pupils are equal, round, and reactive to light. EOM are normal.   Neck: Neck supple. No JVD present. No tracheal deviation present. No thyromegaly present.   Cardiovascular: Normal rate, regular rhythm, S1 normal, S2 normal, intact distal pulses and normal pulses. PMI is not displaced. Exam reveals no gallop and no friction rub.   No murmur heard.  Pulmonary/Chest: Effort normal and breath sounds normal. No respiratory distress. He has no wheezes. He has no rales. He exhibits no tenderness.   Abdominal: Soft. Bowel sounds are normal. He exhibits no distension and no mass. There is no abdominal tenderness.   Musculoskeletal: Normal range of motion.         General: No tenderness or edema.   Neurological: He is alert and oriented to person, place, and time.   Skin: Skin is warm and dry. No rash noted.   Psychiatric: He has a normal mood and affect. His behavior is normal.         Assessment:          ICD-10-CM ICD-9-CM   1. Status post coronary artery stent placement Z95.5 V45.82   2. Coronary artery disease involving native coronary artery of native heart without angina pectoris I25.10 414.01   3. Essential hypertension I10 401.9   4. Dyslipidemia E78.5 272.4          Problem List Items Addressed This Visit           Status post coronary artery stent placement - Primary (Chronic)      Overview                                             Previous PCI:                                                                S/P coated stent to LAD, 07/29/2011                                          S/P coated stent to RCA, 07/29/2011                                          S/P stent to RCA, 11/14/2000                  Essential hypertension (Chronic)      Dyslipidemia      Overview        hx increased LFT while on niaspan an  vytorin. Myalgia with  lipitor, zocor.            CAD (coronary artery disease) (Chronic)      Overview        07/11/2011 mid LAD, 80% stenosis; mid LCX, 60% stenosis; mid RCA, 80%        stenosis;                                                                    11/14/2000 LAD, luminal irregularities; LCX, luminal irregularities;         RCA, occluded;                             Plan:       Continue current cardiac medications.   Discussed diet and exercise.   Repeat lipids in 4-6 months.   F/U with Dr. Borrero in 1 year with CMP and Lipids.

## 2020-09-15 ENCOUNTER — PATIENT OUTREACH (OUTPATIENT)
Dept: ADMINISTRATIVE | Facility: OTHER | Age: 70
End: 2020-09-15

## 2020-09-15 NOTE — PROGRESS NOTES
Health Maintenance Due   Topic Date Due    Influenza Vaccine (1) 08/01/2020     Updates were requested from care everywhere.  Chart was reviewed for overdue Proactive [facility] Encounters ([facility]) topics (CRS, Breast Cancer Screening, Eye exam)  Health Maintenance has been updated.  [facility] immunization registry triggered.  Immunizations were reconciled.

## 2020-09-16 ENCOUNTER — LAB VISIT (OUTPATIENT)
Dept: LAB | Facility: HOSPITAL | Age: 70
End: 2020-09-16
Attending: UROLOGY
Payer: MEDICARE

## 2020-09-16 ENCOUNTER — OFFICE VISIT (OUTPATIENT)
Dept: UROLOGY | Facility: CLINIC | Age: 70
End: 2020-09-16
Payer: MEDICARE

## 2020-09-16 VITALS — WEIGHT: 199.31 LBS | RESPIRATION RATE: 18 BRPM | BODY MASS INDEX: 29.43 KG/M2

## 2020-09-16 DIAGNOSIS — C61 PROSTATE CANCER: Primary | ICD-10-CM

## 2020-09-16 DIAGNOSIS — C61 PROSTATE CANCER: ICD-10-CM

## 2020-09-16 DIAGNOSIS — N52.31 ERECTILE DYSFUNCTION AFTER RADICAL PROSTATECTOMY: ICD-10-CM

## 2020-09-16 LAB — COMPLEXED PSA SERPL-MCNC: <0.01 NG/ML (ref 0–4)

## 2020-09-16 PROCEDURE — 1101F PR PT FALLS ASSESS DOC 0-1 FALLS W/OUT INJ PAST YR: ICD-10-PCS | Mod: HCNC,CPTII,S$GLB, | Performed by: UROLOGY

## 2020-09-16 PROCEDURE — 1159F MED LIST DOCD IN RCRD: CPT | Mod: HCNC,S$GLB,, | Performed by: UROLOGY

## 2020-09-16 PROCEDURE — 36415 COLL VENOUS BLD VENIPUNCTURE: CPT | Mod: HCNC,PO

## 2020-09-16 PROCEDURE — 99999 PR PBB SHADOW E&M-EST. PATIENT-LVL II: ICD-10-PCS | Mod: PBBFAC,HCNC,, | Performed by: UROLOGY

## 2020-09-16 PROCEDURE — 99999 PR PBB SHADOW E&M-EST. PATIENT-LVL II: CPT | Mod: PBBFAC,HCNC,, | Performed by: UROLOGY

## 2020-09-16 PROCEDURE — 1126F AMNT PAIN NOTED NONE PRSNT: CPT | Mod: HCNC,S$GLB,, | Performed by: UROLOGY

## 2020-09-16 PROCEDURE — 84153 ASSAY OF PSA TOTAL: CPT | Mod: HCNC

## 2020-09-16 PROCEDURE — 99213 OFFICE O/P EST LOW 20 MIN: CPT | Mod: HCNC,S$GLB,, | Performed by: UROLOGY

## 2020-09-16 PROCEDURE — 1126F PR PAIN SEVERITY QUANTIFIED, NO PAIN PRESENT: ICD-10-PCS | Mod: HCNC,S$GLB,, | Performed by: UROLOGY

## 2020-09-16 PROCEDURE — 99213 PR OFFICE/OUTPT VISIT, EST, LEVL III, 20-29 MIN: ICD-10-PCS | Mod: HCNC,S$GLB,, | Performed by: UROLOGY

## 2020-09-16 PROCEDURE — 1159F PR MEDICATION LIST DOCUMENTED IN MEDICAL RECORD: ICD-10-PCS | Mod: HCNC,S$GLB,, | Performed by: UROLOGY

## 2020-09-16 PROCEDURE — 3008F BODY MASS INDEX DOCD: CPT | Mod: HCNC,CPTII,S$GLB, | Performed by: UROLOGY

## 2020-09-16 PROCEDURE — 3008F PR BODY MASS INDEX (BMI) DOCUMENTED: ICD-10-PCS | Mod: HCNC,CPTII,S$GLB, | Performed by: UROLOGY

## 2020-09-16 PROCEDURE — 1101F PT FALLS ASSESS-DOCD LE1/YR: CPT | Mod: HCNC,CPTII,S$GLB, | Performed by: UROLOGY

## 2020-09-16 NOTE — PROGRESS NOTES
Subjective:       Patient ID: Alfonso Ewing is a 70 y.o. male.    Chief Complaint: Follow-up (6 week )    S/P RALP on 7/31/2020  No new complaints  Wears one pad a day  Good flow     Past Medical History:   Diagnosis Date    Allergy     Anticoagulant long-term use     ASA 81 mg, Plavix    BPH (benign prostatic hyperplasia)     BPH (benign prostatic hypertrophy)     Bradycardia     chronic since 2012    Bradycardia 6/21/2012    CAD (coronary artery disease) 2/13/2012 07/11/2011 mid LAD, 80% stenosis; mid LCX, 60% stenosis; mid RCA, 80%       stenosis;                                                                   11/14/2000 LAD, luminal irregularities; LCX, luminal irregularities;        RCA, occluded;                                                                  Cataract     ou done//    Coronary artery disease     2 stents    Dyslipidemia 2/13/2012    hx increased LFT while on niaspan an  vytorin. Myalgia with lipitor, zocor.     Dyspnea on exertion     stable, chronic    Essential hypertension 2/13/2012    Gout     HEARING LOSS     SEVERE -  No hearing aids anymore    History of myocardial infarction 2000    Hypertension     Nephrolithiasis 1980    passed it on his own    Otitis media     Sleep apnea     use cpap    Status post coronary artery stent placement 6/21/2012                                        Previous PCI:                                                               S/P coated stent to LAD, 07/29/2011                                         S/P coated stent to RCA, 07/29/2011                                         S/P stent to RCA, 11/14/2000            Past Surgical History:   Procedure Laterality Date    CARDIAC CATHETERIZATION      2 stents    CATARACT EXTRACTION W/  INTRAOCULAR LENS IMPLANT Right 11/24/2015    By Dr Byrd    CATARACT EXTRACTION W/  INTRAOCULAR LENS IMPLANT Left 1/12/2016    [facility]    COLONOSCOPY      COLONOSCOPY N/A 1/31/2019    Procedure:  COLONOSCOPY;  Surgeon: Evan Basurto MD;  Location: [facility] ENDO;  Service: Endoscopy;  Laterality: N/A;    CORONARY STENT PLACEMENT      x 2    ROBOT-ASSISTED LAPAROSCOPIC PROSTATECTOMY USING DA VIVIEN XI N/A 7/31/2020    Procedure: XI ROBOTIC PROSTATECTOMY;  Surgeon: Ranjit Mckeon MD;  Location: [facility] OR;  Service: Urology;  Laterality: N/A;    ROBOT-ASSISTED LAPAROSCOPIC RETROPERITONEAL LYMPHADENECTOMY USING DA VIVIEN XI N/A 7/31/2020    Procedure: XI ROBOTIC LYMPHADENECTOMY, RETROPERITONEUM;  Surgeon: Ranjit Mckeon MD;  Location: [facility] OR;  Service: Urology;  Laterality: N/A;    TRANSRECTAL BIOPSY OF PROSTATE WITH ULTRASOUND GUIDANCE Bilateral 2/10/2020    Procedure: BIOPSY, PROSTATE, RECTAL APPROACH, WITH US GUIDANCE;  Surgeon: Tavares Thomas MD;  Location: [facility] OR;  Service: Urology;  Laterality: Bilateral;     Social History     Socioeconomic History    Marital status:      Spouse name: Not on file    Number of children: 2    Years of education: Not on file    Highest education level: Not on file   Occupational History    Occupation: retired retired     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     Comment:    Social Needs    Financial resource strain: Not on file    Food insecurity     Worry: Not on file     Inability: Not on file    Transportation needs     Medical: Not on file     Non-medical: Not on file   Tobacco Use    Smoking status: Never Smoker    Smokeless tobacco: Former User     Types: Snuff    Tobacco comment: dipped tobacco   Substance and Sexual Activity    Alcohol use: No    Drug use: No    Sexual activity: Yes     Partners: Female   Lifestyle    Physical activity     Days per week: Not on file     Minutes per session: Not on file    Stress: Not on file   Relationships    Social connections     Talks on phone: Not on file     Gets together: Not on file     Attends Orthodox service: Not on file     Active member of club or  organization: Not on file     Attends meetings of clubs or organizations: Not on file     Relationship status: Not on file   Other Topics Concern    Not on file   Social History Narrative    Not on file       Family History   Problem Relation Age of Onset    Stroke Mother     Kidney disease Mother     Glaucoma Mother     Heart failure Father     Heart disease Father     Hypertension Father     Hyperlipidemia Father     Glaucoma Brother     Diabetes Brother     Heart disease Brother     Hyperlipidemia Brother     Prostate cancer Brother     Cancer Sister     Hypertension Son     Heart disease Brother     Diabetes Brother     Hyperlipidemia Brother     Heart disease Brother     Hyperlipidemia Brother     Heart disease Brother     Hyperlipidemia Brother     Hypertension Brother     Hypertension Brother     Hyperlipidemia Brother     Heart disease Sister     Hypertension Sister     Hyperlipidemia Sister     Heart disease Sister     Hypertension Sister     Hyperlipidemia Sister     Heart disease Sister     Hypertension Sister     Hyperlipidemia Sister     Prostate cancer Sister     Amblyopia Neg Hx     Blindness Neg Hx     Cataracts Neg Hx     Macular degeneration Neg Hx     Retinal detachment Neg Hx     Strabismus Neg Hx     Thyroid disease Neg Hx       Review of patient's allergies indicates:   Allergen Reactions    Niacin Rash    Promethazine Nausea Only    Statins-hmg-coa reductase inhibitors Other (See Comments)     Other reaction(s): Muscle pain     Medication List with Changes/Refills   Current Medications    AMLODIPINE (NORVASC) 5 MG TABLET    Take 1 tablet (5 mg total) by mouth every evening.    ASPIRIN (ECOTRIN) 81 MG EC TABLET    Take 81 mg by mouth every evening. HOLDING UNTIL INST BY MD    CLOPIDOGREL (PLAVIX) 75 MG TABLET    Take 1 tablet (75 mg total) by mouth once daily.    COLCHICINE 0.6 MG TABLET    1 tab now and 1 at bedtime, then 1 tab daily.    EZETIMIBE  (ZETIA) 10 MG TABLET    Take 1 tablet (10 mg total) by mouth once daily.    FLUOROURACIL (EFUDEX) 5 % CREAM    Apply topically 2 (two) times daily. To L arm x 2 weeks; then twice daily to R arm x 2 weeks    NITROGLYCERIN (NITROSTAT) 0.4 MG SL TABLET    Place 1 tablet (0.4 mg total) under the tongue every 5 (five) minutes as needed for Chest pain. As directed PRN    PRAVASTATIN (PRAVACHOL) 20 MG TABLET    Take 1 tablet (20 mg total) by mouth every evening.    VALSARTAN-HYDROCHLOROTHIAZIDE (DIOVAN-HCT) 160-12.5 MG PER TABLET    Take 1 tablet by mouth once daily.      Review of Systems   Constitutional: Negative for fatigue, fever and unexpected weight change.   HENT: Negative for congestion.    Eyes: Negative for visual disturbance.   Respiratory: Negative for cough, choking and shortness of breath.    Cardiovascular: Negative for chest pain and leg swelling.   Gastrointestinal: Negative for abdominal distention, constipation, diarrhea, nausea and vomiting.   Endocrine: Negative for polyuria.   Genitourinary: Negative for difficulty urinating, flank pain, hematuria and testicular pain.        Mild stress incontinence   Musculoskeletal: Negative for back pain.   Skin: Negative for color change and wound.   Allergic/Immunologic: Negative for immunocompromised state.   Neurological: Negative for weakness.   Hematological: Negative for adenopathy. Does not bruise/bleed easily.   Psychiatric/Behavioral: Negative for confusion.       Objective:      Physical Exam   Nursing note and vitals reviewed.  Constitutional: He is oriented to person, place, and time. He appears well-developed. No distress.   HENT:   Head: Normocephalic and atraumatic.   Eyes: Pupils are equal, round, and reactive to light.   Neck: Neck supple.   Cardiovascular: Normal rate.    Pulmonary/Chest: Effort normal. No respiratory distress. He has no wheezes.   Abdominal: Soft. He exhibits no distension and no mass. There is no abdominal tenderness. There is  "no rebound and no guarding. Hernia confirmed negative in the right inguinal area and confirmed negative in the left inguinal area.   Genitourinary:    Testes and penis normal.   Rectum:      No rectal mass, tenderness, external hemorrhoid or internal hemorrhoid.   Prostate is not tender. Right testis shows no swelling and no tenderness. Left testis shows no swelling and no tenderness. Circumcised. No penile tenderness.   Neurological: He is alert and oriented to person, place, and time.   Skin: Skin is warm. He is not diaphoretic.           Sodium   Date Value Ref Range Status   08/01/2020 135 (L) 136 - 145 mmol/L Final     Potassium   Date Value Ref Range Status   08/01/2020 3.8 3.5 - 5.1 mmol/L Final     Chloride   Date Value Ref Range Status   08/01/2020 102 95 - 110 mmol/L Final     CO2   Date Value Ref Range Status   08/01/2020 27 22 - 31 mmol/L Final     Creatinine   Date Value Ref Range Status   08/01/2020 1.31 0.50 - 1.40 mg/dL Final     Glucose   Date Value Ref Range Status   08/01/2020 124 (H) 70 - 110 mg/dL Final     Comment:     The ADA recommends the following guidelines for fasting glucose:  Normal:       less than 100 mg/dL  Prediabetes:  100 mg/dL to 125 mg/dL  Diabetes:     126 mg/dL or higher       AST   Date Value Ref Range Status   07/27/2020 25 10 - 40 U/L Final     ALT   Date Value Ref Range Status   07/27/2020 32 10 - 44 U/L Final     WBC   Date Value Ref Range Status   08/01/2020 13.68 (H) 3.90 - 12.70 K/uL Final     Hemoglobin   Date Value Ref Range Status   08/01/2020 13.0 (L) 14.0 - 18.0 g/dL Final     Hematocrit   Date Value Ref Range Status   08/01/2020 38.3 (L) 40.0 - 54.0 % Final     Platelets   Date Value Ref Range Status   08/01/2020 194 150 - 350 K/uL Final     INR   Date Value Ref Range Status   07/27/2020 1.1  Final   07/11/2011 1.0 0.8 - 1.2 Final     Comment:     ACCP Guideline for Coumadin usage recommends a "target range" of  2.0 - 3.0 for INR for all indicators except " mechanical heart valves  and antiphospholipid syndromes which should use 2.5 - 3.5.  .     PSA, SCREEN   Date Value Ref Range Status   12/10/2019 7.0 (H) 0.00 - 4.00 ng/mL Final     Comment:     PSA Expected levels:  Hormonal Therapy: <0.05 ng/ml  Prostatectomy: <0.01 ng/ml  Radiation Therapy: <1.00 ng/ml            Assessment/Plan: Prostate cancer [place] 3+4 (Margins negative)                                         - Check PSA today                                    Erectile dysfunction - Occasional partial erections noted                                                                      - Tadalafil trial           Problem List Items Addressed This Visit     None

## 2020-09-25 RX ORDER — VALSARTAN AND HYDROCHLOROTHIAZIDE 160; 12.5 MG/1; MG/1
TABLET, FILM COATED ORAL
Qty: 90 TABLET | Refills: 4 | Status: SHIPPED | OUTPATIENT
Start: 2020-09-25 | End: 2022-01-03

## 2020-10-06 ENCOUNTER — PES CALL (OUTPATIENT)
Dept: ADMINISTRATIVE | Facility: CLINIC | Age: 70
End: 2020-10-06

## 2020-10-13 DIAGNOSIS — E78.5 DYSLIPIDEMIA: ICD-10-CM

## 2020-10-13 DIAGNOSIS — R00.1 BRADYCARDIA: Chronic | ICD-10-CM

## 2020-10-13 DIAGNOSIS — Z95.5 STATUS POST CORONARY ARTERY STENT PLACEMENT: Chronic | ICD-10-CM

## 2020-10-13 DIAGNOSIS — I10 ESSENTIAL HYPERTENSION: Chronic | ICD-10-CM

## 2020-10-13 RX ORDER — CLOPIDOGREL BISULFATE 75 MG/1
TABLET ORAL
Qty: 90 TABLET | Refills: 4 | Status: SHIPPED | OUTPATIENT
Start: 2020-10-13 | End: 2022-01-06

## 2020-11-16 ENCOUNTER — PATIENT OUTREACH (OUTPATIENT)
Dept: ADMINISTRATIVE | Facility: HOSPITAL | Age: 70
End: 2020-11-16

## 2020-11-16 NOTE — LETTER
November 16, 2020    Alfonso Ewing  [address] [address] [address]             [facility]  [address]  [facility] [address]  Phone: [phone] Dear Robert, [facility] is committed to your overall health. Currently, we have Adrian Raza MD listed as your primary care provider. If this is incorrect, please let us know by emailing or contacting our office at [phone] so we can update our records.    Our records show you have not been seen in twelve months by your primary care provider. Please schedule online through your [email or web address]  account or call our office at [phone].   You may also be due for the following test and/or procedures:    Influenza Vaccine    If you already have any of the above scheduled, please disregard this message.  If you have had any of the above done at a non-[facility] facility, please notify us so that we can obtain a copy or bring a copy to your next Primary Care visit.    Your [facility] care team is committed to supporting your overall health. We look forward to seeing you soon and appreciate the opportunity to serve you.      Sincerely,     Adrian Raza MD and your [facility] Primary Care Team

## 2020-11-18 ENCOUNTER — PES CALL (OUTPATIENT)
Dept: ADMINISTRATIVE | Facility: CLINIC | Age: 70
End: 2020-11-18

## 2020-12-14 ENCOUNTER — PATIENT OUTREACH (OUTPATIENT)
Dept: ADMINISTRATIVE | Facility: OTHER | Age: 70
End: 2020-12-14

## 2020-12-14 NOTE — PROGRESS NOTES
Health Maintenance Due   Topic Date Due    Influenza Vaccine (1) 08/01/2020     Updates were requested from care everywhere.  Chart was reviewed for overdue Proactive [facility] Encounters (RACHANA) topics (CRS, Breast Cancer Screening, Eye exam)  Health Maintenance has been updated.  LINKS immunization registry triggered.  LINKS not responding.

## 2020-12-16 ENCOUNTER — IMMUNIZATION (OUTPATIENT)
Dept: PHARMACY | Facility: CLINIC | Age: 70
End: 2020-12-16
Payer: MEDICARE

## 2020-12-16 ENCOUNTER — OFFICE VISIT (OUTPATIENT)
Dept: UROLOGY | Facility: CLINIC | Age: 70
End: 2020-12-16
Payer: MEDICARE

## 2020-12-16 VITALS
BODY MASS INDEX: 29.68 KG/M2 | HEIGHT: 69 IN | WEIGHT: 200.38 LBS | DIASTOLIC BLOOD PRESSURE: 66 MMHG | SYSTOLIC BLOOD PRESSURE: 143 MMHG | HEART RATE: 44 BPM

## 2020-12-16 DIAGNOSIS — C61 MALIGNANT NEOPLASM OF PROSTATE: Primary | ICD-10-CM

## 2020-12-16 DIAGNOSIS — N52.01 ERECTILE DYSFUNCTION DUE TO ARTERIAL INSUFFICIENCY: ICD-10-CM

## 2020-12-16 PROCEDURE — 1159F MED LIST DOCD IN RCRD: CPT | Mod: HCNC,S$GLB,, | Performed by: UROLOGY

## 2020-12-16 PROCEDURE — 99999 PR PBB SHADOW E&M-EST. PATIENT-LVL III: CPT | Mod: PBBFAC,HCNC,, | Performed by: UROLOGY

## 2020-12-16 PROCEDURE — 1101F PT FALLS ASSESS-DOCD LE1/YR: CPT | Mod: HCNC,CPTII,S$GLB, | Performed by: UROLOGY

## 2020-12-16 PROCEDURE — 1159F PR MEDICATION LIST DOCUMENTED IN MEDICAL RECORD: ICD-10-PCS | Mod: HCNC,S$GLB,, | Performed by: UROLOGY

## 2020-12-16 PROCEDURE — 3008F PR BODY MASS INDEX (BMI) DOCUMENTED: ICD-10-PCS | Mod: HCNC,CPTII,S$GLB, | Performed by: UROLOGY

## 2020-12-16 PROCEDURE — 3077F SYST BP >= 140 MM HG: CPT | Mod: HCNC,CPTII,S$GLB, | Performed by: UROLOGY

## 2020-12-16 PROCEDURE — 99214 PR OFFICE/OUTPT VISIT, EST, LEVL IV, 30-39 MIN: ICD-10-PCS | Mod: HCNC,S$GLB,, | Performed by: UROLOGY

## 2020-12-16 PROCEDURE — 1126F AMNT PAIN NOTED NONE PRSNT: CPT | Mod: HCNC,S$GLB,, | Performed by: UROLOGY

## 2020-12-16 PROCEDURE — 1126F PR PAIN SEVERITY QUANTIFIED, NO PAIN PRESENT: ICD-10-PCS | Mod: HCNC,S$GLB,, | Performed by: UROLOGY

## 2020-12-16 PROCEDURE — 99999 PR PBB SHADOW E&M-EST. PATIENT-LVL III: ICD-10-PCS | Mod: PBBFAC,HCNC,, | Performed by: UROLOGY

## 2020-12-16 PROCEDURE — 3288F FALL RISK ASSESSMENT DOCD: CPT | Mod: HCNC,CPTII,S$GLB, | Performed by: UROLOGY

## 2020-12-16 PROCEDURE — 3288F PR FALLS RISK ASSESSMENT DOCUMENTED: ICD-10-PCS | Mod: HCNC,CPTII,S$GLB, | Performed by: UROLOGY

## 2020-12-16 PROCEDURE — 3078F DIAST BP <80 MM HG: CPT | Mod: HCNC,CPTII,S$GLB, | Performed by: UROLOGY

## 2020-12-16 PROCEDURE — 3077F PR MOST RECENT SYSTOLIC BLOOD PRESSURE >= 140 MM HG: ICD-10-PCS | Mod: HCNC,CPTII,S$GLB, | Performed by: UROLOGY

## 2020-12-16 PROCEDURE — 99214 OFFICE O/P EST MOD 30 MIN: CPT | Mod: HCNC,S$GLB,, | Performed by: UROLOGY

## 2020-12-16 PROCEDURE — 3008F BODY MASS INDEX DOCD: CPT | Mod: HCNC,CPTII,S$GLB, | Performed by: UROLOGY

## 2020-12-16 PROCEDURE — 3078F PR MOST RECENT DIASTOLIC BLOOD PRESSURE < 80 MM HG: ICD-10-PCS | Mod: HCNC,CPTII,S$GLB, | Performed by: UROLOGY

## 2020-12-16 PROCEDURE — 1101F PR PT FALLS ASSESS DOC 0-1 FALLS W/OUT INJ PAST YR: ICD-10-PCS | Mod: HCNC,CPTII,S$GLB, | Performed by: UROLOGY

## 2020-12-16 RX ORDER — TADALAFIL 20 MG/1
TABLET ORAL
COMMUNITY
Start: 2020-09-16 | End: 2022-06-08

## 2020-12-16 NOTE — PROGRESS NOTES
UROLOGY [place]  12 16 20     Cc follow up prostate ca     Age 70, has been followed since 2011, for elevated psa and later diagnosed with prostate ca, cT1c cN0 cM0 tri 7 (3+4) in four sextants, with involvement varying between 5 and 20 % of the tissue. psa at presentation was 14 when adjusted for proscar. no prostate nodules had been noticed on rectal examination.     Bone scan and CT scan were negative for metastatic disease.     Pt underwent robotic radical prostatectomy by dr tobi adbi on 7 31 20. Pathology examination after surgery upheld the tri grade and the extension of the tumor growth described in the initial needle biopsy. Seminal vesicles and lymph nodes were negative. Surgical margins were negative.     Pt reported small urinary incontinence after surgery when getting up from a low sitting position for about 2 months, during which time he used a diaper or a pad for protection. He then became dry and currently needs no pads.     Pt interested in viagra for improvement of sexual function.     IMP  cT1c cN0 cM0 tri 7 (3+4) in four sextants, with involvement 20 % of the tissue. psa 14 when adjusted for proscar, currently undetectable.      viagra 100 mg recommended.  RTC yearly

## 2021-01-06 ENCOUNTER — LAB VISIT (OUTPATIENT)
Dept: LAB | Facility: HOSPITAL | Age: 71
End: 2021-01-06
Attending: INTERNAL MEDICINE
Payer: MEDICARE

## 2021-01-06 DIAGNOSIS — E78.5 DYSLIPIDEMIA: ICD-10-CM

## 2021-01-06 DIAGNOSIS — N18.30 CKD (CHRONIC KIDNEY DISEASE) STAGE 3, GFR 30-59 ML/MIN: ICD-10-CM

## 2021-01-06 DIAGNOSIS — Z95.5 STATUS POST CORONARY ARTERY STENT PLACEMENT: Chronic | ICD-10-CM

## 2021-01-06 DIAGNOSIS — I10 ESSENTIAL HYPERTENSION: Chronic | ICD-10-CM

## 2021-01-06 LAB
ALBUMIN SERPL BCP-MCNC: 4.1 G/DL (ref 3.5–5.2)
ANION GAP SERPL CALC-SCNC: 10 MMOL/L (ref 8–16)
BUN SERPL-MCNC: 16 MG/DL (ref 8–23)
CALCIUM SERPL-MCNC: 9.5 MG/DL (ref 8.7–10.5)
CHLORIDE SERPL-SCNC: 103 MMOL/L (ref 95–110)
CHOLEST SERPL-MCNC: 153 MG/DL (ref 120–199)
CHOLEST/HDLC SERPL: 3.9 {RATIO} (ref 2–5)
CO2 SERPL-SCNC: 29 MMOL/L (ref 23–29)
CREAT SERPL-MCNC: 1.3 MG/DL (ref 0.5–1.4)
EST. GFR  (AFRICAN AMERICAN): >60 ML/MIN/1.73 M^2
EST. GFR  (NON AFRICAN AMERICAN): 54.5 ML/MIN/1.73 M^2
GLUCOSE SERPL-MCNC: 103 MG/DL (ref 70–110)
HDLC SERPL-MCNC: 39 MG/DL (ref 40–75)
HDLC SERPL: 25.5 % (ref 20–50)
LDLC SERPL CALC-MCNC: 79.8 MG/DL (ref 63–159)
NONHDLC SERPL-MCNC: 114 MG/DL
PHOSPHATE SERPL-MCNC: 3.2 MG/DL (ref 2.7–4.5)
POTASSIUM SERPL-SCNC: 4.1 MMOL/L (ref 3.5–5.1)
PTH-INTACT SERPL-MCNC: 131 PG/ML (ref 9–77)
SODIUM SERPL-SCNC: 142 MMOL/L (ref 136–145)
TRIGL SERPL-MCNC: 171 MG/DL (ref 30–150)

## 2021-01-06 PROCEDURE — 80069 RENAL FUNCTION PANEL: CPT | Mod: HCNC

## 2021-01-06 PROCEDURE — 83970 ASSAY OF PARATHORMONE: CPT | Mod: HCNC

## 2021-01-06 PROCEDURE — 36415 COLL VENOUS BLD VENIPUNCTURE: CPT | Mod: HCNC,PO

## 2021-01-06 PROCEDURE — 80061 LIPID PANEL: CPT | Mod: HCNC

## 2021-01-14 ENCOUNTER — OFFICE VISIT (OUTPATIENT)
Dept: NEPHROLOGY | Facility: CLINIC | Age: 71
End: 2021-01-14
Payer: MEDICARE

## 2021-01-14 VITALS
DIASTOLIC BLOOD PRESSURE: 76 MMHG | OXYGEN SATURATION: 98 % | HEIGHT: 69 IN | WEIGHT: 201.25 LBS | SYSTOLIC BLOOD PRESSURE: 154 MMHG | HEART RATE: 45 BPM | BODY MASS INDEX: 29.81 KG/M2

## 2021-01-14 DIAGNOSIS — N18.30 STAGE 3 CHRONIC KIDNEY DISEASE, UNSPECIFIED WHETHER STAGE 3A OR 3B CKD: Primary | ICD-10-CM

## 2021-01-14 DIAGNOSIS — I10 ESSENTIAL HYPERTENSION: ICD-10-CM

## 2021-01-14 DIAGNOSIS — N28.1 ACQUIRED CYST OF KIDNEY: ICD-10-CM

## 2021-01-14 PROCEDURE — 3078F DIAST BP <80 MM HG: CPT | Mod: HCNC,CPTII,S$GLB, | Performed by: INTERNAL MEDICINE

## 2021-01-14 PROCEDURE — 3077F PR MOST RECENT SYSTOLIC BLOOD PRESSURE >= 140 MM HG: ICD-10-PCS | Mod: HCNC,CPTII,S$GLB, | Performed by: INTERNAL MEDICINE

## 2021-01-14 PROCEDURE — 99499 UNLISTED E&M SERVICE: CPT | Mod: S$GLB,,, | Performed by: INTERNAL MEDICINE

## 2021-01-14 PROCEDURE — 3008F BODY MASS INDEX DOCD: CPT | Mod: HCNC,CPTII,S$GLB, | Performed by: INTERNAL MEDICINE

## 2021-01-14 PROCEDURE — 99999 PR PBB SHADOW E&M-EST. PATIENT-LVL III: ICD-10-PCS | Mod: PBBFAC,HCNC,, | Performed by: INTERNAL MEDICINE

## 2021-01-14 PROCEDURE — 99214 PR OFFICE/OUTPT VISIT, EST, LEVL IV, 30-39 MIN: ICD-10-PCS | Mod: HCNC,S$GLB,, | Performed by: INTERNAL MEDICINE

## 2021-01-14 PROCEDURE — 3078F PR MOST RECENT DIASTOLIC BLOOD PRESSURE < 80 MM HG: ICD-10-PCS | Mod: HCNC,CPTII,S$GLB, | Performed by: INTERNAL MEDICINE

## 2021-01-14 PROCEDURE — 3288F FALL RISK ASSESSMENT DOCD: CPT | Mod: HCNC,CPTII,S$GLB, | Performed by: INTERNAL MEDICINE

## 2021-01-14 PROCEDURE — 3008F PR BODY MASS INDEX (BMI) DOCUMENTED: ICD-10-PCS | Mod: HCNC,CPTII,S$GLB, | Performed by: INTERNAL MEDICINE

## 2021-01-14 PROCEDURE — 1101F PT FALLS ASSESS-DOCD LE1/YR: CPT | Mod: HCNC,CPTII,S$GLB, | Performed by: INTERNAL MEDICINE

## 2021-01-14 PROCEDURE — 99214 OFFICE O/P EST MOD 30 MIN: CPT | Mod: HCNC,S$GLB,, | Performed by: INTERNAL MEDICINE

## 2021-01-14 PROCEDURE — 1159F MED LIST DOCD IN RCRD: CPT | Mod: HCNC,S$GLB,, | Performed by: INTERNAL MEDICINE

## 2021-01-14 PROCEDURE — 1159F PR MEDICATION LIST DOCUMENTED IN MEDICAL RECORD: ICD-10-PCS | Mod: HCNC,S$GLB,, | Performed by: INTERNAL MEDICINE

## 2021-01-14 PROCEDURE — 3077F SYST BP >= 140 MM HG: CPT | Mod: HCNC,CPTII,S$GLB, | Performed by: INTERNAL MEDICINE

## 2021-01-14 PROCEDURE — 3288F PR FALLS RISK ASSESSMENT DOCUMENTED: ICD-10-PCS | Mod: HCNC,CPTII,S$GLB, | Performed by: INTERNAL MEDICINE

## 2021-01-14 PROCEDURE — 1101F PR PT FALLS ASSESS DOC 0-1 FALLS W/OUT INJ PAST YR: ICD-10-PCS | Mod: HCNC,CPTII,S$GLB, | Performed by: INTERNAL MEDICINE

## 2021-01-14 PROCEDURE — 99499 RISK ADDL DX/OHS AUDIT: ICD-10-PCS | Mod: S$GLB,,, | Performed by: INTERNAL MEDICINE

## 2021-01-14 PROCEDURE — 99999 PR PBB SHADOW E&M-EST. PATIENT-LVL III: CPT | Mod: PBBFAC,HCNC,, | Performed by: INTERNAL MEDICINE

## 2021-01-27 DIAGNOSIS — I10 ESSENTIAL HYPERTENSION: Chronic | ICD-10-CM

## 2021-01-27 DIAGNOSIS — Z95.5 STATUS POST CORONARY ARTERY STENT PLACEMENT: Chronic | ICD-10-CM

## 2021-01-27 DIAGNOSIS — R00.1 BRADYCARDIA: Chronic | ICD-10-CM

## 2021-01-27 DIAGNOSIS — E78.5 DYSLIPIDEMIA: ICD-10-CM

## 2021-01-27 RX ORDER — PRAVASTATIN SODIUM 40 MG/1
40 TABLET ORAL NIGHTLY
Qty: 90 TABLET | Refills: 3 | Status: SHIPPED | OUTPATIENT
Start: 2021-01-27 | End: 2022-01-24

## 2021-03-11 ENCOUNTER — PES CALL (OUTPATIENT)
Dept: ADMINISTRATIVE | Facility: CLINIC | Age: 71
End: 2021-03-11

## 2021-04-07 ENCOUNTER — PES CALL (OUTPATIENT)
Dept: ADMINISTRATIVE | Facility: CLINIC | Age: 71
End: 2021-04-07

## 2021-05-06 ENCOUNTER — PES CALL (OUTPATIENT)
Dept: ADMINISTRATIVE | Facility: CLINIC | Age: 71
End: 2021-05-06

## 2021-08-23 ENCOUNTER — PES CALL (OUTPATIENT)
Dept: ADMINISTRATIVE | Facility: CLINIC | Age: 71
End: 2021-08-23

## 2021-09-04 NOTE — TELEPHONE ENCOUNTER
----- Message from Keiry Landry sent at 5/6/2020  2:26 PM CDT -----  Contact: Wife Crystal  Patient has an appt w/Dr Mckeon on 5/13 and he has tests that needs rescheduling before he see Dr Mckeon.  Please call Crystal back at [phone] (home).  Thanks    
Spoke to pt's wife, trying to schedule bone, ct and labs prior to apt on the 13th with jin.   
Initial (On Arrival)

## 2021-09-15 ENCOUNTER — PES CALL (OUTPATIENT)
Dept: ADMINISTRATIVE | Facility: CLINIC | Age: 71
End: 2021-09-15

## 2021-09-27 ENCOUNTER — TELEPHONE (OUTPATIENT)
Dept: FAMILY MEDICINE | Facility: CLINIC | Age: 71
End: 2021-09-27

## 2021-09-27 DIAGNOSIS — U07.1 COVID-19 VIRUS INFECTION: Primary | ICD-10-CM

## 2021-10-28 ENCOUNTER — OFFICE VISIT (OUTPATIENT)
Dept: FAMILY MEDICINE | Facility: CLINIC | Age: 71
End: 2021-10-28
Payer: MEDICARE

## 2021-10-28 VITALS
SYSTOLIC BLOOD PRESSURE: 138 MMHG | HEART RATE: 50 BPM | DIASTOLIC BLOOD PRESSURE: 80 MMHG | WEIGHT: 204.56 LBS | OXYGEN SATURATION: 97 % | HEIGHT: 69 IN | BODY MASS INDEX: 30.3 KG/M2

## 2021-10-28 DIAGNOSIS — I25.10 CORONARY ARTERY DISEASE INVOLVING NATIVE CORONARY ARTERY OF NATIVE HEART WITHOUT ANGINA PECTORIS: Chronic | ICD-10-CM

## 2021-10-28 DIAGNOSIS — Z00.00 ENCOUNTER FOR PREVENTIVE HEALTH EXAMINATION: Primary | ICD-10-CM

## 2021-10-28 DIAGNOSIS — N18.30 STAGE 3 CHRONIC KIDNEY DISEASE, UNSPECIFIED WHETHER STAGE 3A OR 3B CKD: ICD-10-CM

## 2021-10-28 DIAGNOSIS — I10 ESSENTIAL HYPERTENSION: Chronic | ICD-10-CM

## 2021-10-28 PROCEDURE — 1126F PR PAIN SEVERITY QUANTIFIED, NO PAIN PRESENT: ICD-10-PCS | Mod: HCNC,CPTII,S$GLB, | Performed by: NURSE PRACTITIONER

## 2021-10-28 PROCEDURE — 3079F DIAST BP 80-89 MM HG: CPT | Mod: HCNC,CPTII,S$GLB, | Performed by: NURSE PRACTITIONER

## 2021-10-28 PROCEDURE — G0439 PR MEDICARE ANNUAL WELLNESS SUBSEQUENT VISIT: ICD-10-PCS | Mod: HCNC,S$GLB,, | Performed by: NURSE PRACTITIONER

## 2021-10-28 PROCEDURE — 3066F PR DOCUMENTATION OF TREATMENT FOR NEPHROPATHY: ICD-10-PCS | Mod: HCNC,CPTII,S$GLB, | Performed by: NURSE PRACTITIONER

## 2021-10-28 PROCEDURE — 3288F FALL RISK ASSESSMENT DOCD: CPT | Mod: HCNC,CPTII,S$GLB, | Performed by: NURSE PRACTITIONER

## 2021-10-28 PROCEDURE — 3075F SYST BP GE 130 - 139MM HG: CPT | Mod: HCNC,CPTII,S$GLB, | Performed by: NURSE PRACTITIONER

## 2021-10-28 PROCEDURE — 3075F PR MOST RECENT SYSTOLIC BLOOD PRESS GE 130-139MM HG: ICD-10-PCS | Mod: HCNC,CPTII,S$GLB, | Performed by: NURSE PRACTITIONER

## 2021-10-28 PROCEDURE — 1160F PR REVIEW ALL MEDS BY PRESCRIBER/CLIN PHARMACIST DOCUMENTED: ICD-10-PCS | Mod: HCNC,CPTII,S$GLB, | Performed by: NURSE PRACTITIONER

## 2021-10-28 PROCEDURE — 90694 VACC AIIV4 NO PRSRV 0.5ML IM: CPT | Mod: HCNC,S$GLB,, | Performed by: NURSE PRACTITIONER

## 2021-10-28 PROCEDURE — 3066F NEPHROPATHY DOC TX: CPT | Mod: HCNC,CPTII,S$GLB, | Performed by: NURSE PRACTITIONER

## 2021-10-28 PROCEDURE — 1159F MED LIST DOCD IN RCRD: CPT | Mod: HCNC,CPTII,S$GLB, | Performed by: NURSE PRACTITIONER

## 2021-10-28 PROCEDURE — G0008 FLU VACCINE - QUADRIVALENT - ADJUVANTED: ICD-10-PCS | Mod: HCNC,S$GLB,, | Performed by: NURSE PRACTITIONER

## 2021-10-28 PROCEDURE — 1101F PR PT FALLS ASSESS DOC 0-1 FALLS W/OUT INJ PAST YR: ICD-10-PCS | Mod: HCNC,CPTII,S$GLB, | Performed by: NURSE PRACTITIONER

## 2021-10-28 PROCEDURE — 90694 FLU VACCINE - QUADRIVALENT - ADJUVANTED: ICD-10-PCS | Mod: HCNC,S$GLB,, | Performed by: NURSE PRACTITIONER

## 2021-10-28 PROCEDURE — 1101F PT FALLS ASSESS-DOCD LE1/YR: CPT | Mod: HCNC,CPTII,S$GLB, | Performed by: NURSE PRACTITIONER

## 2021-10-28 PROCEDURE — 99999 PR PBB SHADOW E&M-EST. PATIENT-LVL IV: CPT | Mod: PBBFAC,HCNC,, | Performed by: NURSE PRACTITIONER

## 2021-10-28 PROCEDURE — 99499 UNLISTED E&M SERVICE: CPT | Mod: HCNC,S$GLB,, | Performed by: NURSE PRACTITIONER

## 2021-10-28 PROCEDURE — 3288F PR FALLS RISK ASSESSMENT DOCUMENTED: ICD-10-PCS | Mod: HCNC,CPTII,S$GLB, | Performed by: NURSE PRACTITIONER

## 2021-10-28 PROCEDURE — 3079F PR MOST RECENT DIASTOLIC BLOOD PRESSURE 80-89 MM HG: ICD-10-PCS | Mod: HCNC,CPTII,S$GLB, | Performed by: NURSE PRACTITIONER

## 2021-10-28 PROCEDURE — 1160F RVW MEDS BY RX/DR IN RCRD: CPT | Mod: HCNC,CPTII,S$GLB, | Performed by: NURSE PRACTITIONER

## 2021-10-28 PROCEDURE — 3008F PR BODY MASS INDEX (BMI) DOCUMENTED: ICD-10-PCS | Mod: HCNC,CPTII,S$GLB, | Performed by: NURSE PRACTITIONER

## 2021-10-28 PROCEDURE — 99999 PR PBB SHADOW E&M-EST. PATIENT-LVL IV: ICD-10-PCS | Mod: PBBFAC,HCNC,, | Performed by: NURSE PRACTITIONER

## 2021-10-28 PROCEDURE — G0008 ADMIN INFLUENZA VIRUS VAC: HCPCS | Mod: HCNC,S$GLB,, | Performed by: NURSE PRACTITIONER

## 2021-10-28 PROCEDURE — 1126F AMNT PAIN NOTED NONE PRSNT: CPT | Mod: HCNC,CPTII,S$GLB, | Performed by: NURSE PRACTITIONER

## 2021-10-28 PROCEDURE — 1159F PR MEDICATION LIST DOCUMENTED IN MEDICAL RECORD: ICD-10-PCS | Mod: HCNC,CPTII,S$GLB, | Performed by: NURSE PRACTITIONER

## 2021-10-28 PROCEDURE — 99499 RISK ADDL DX/OHS AUDIT: ICD-10-PCS | Mod: HCNC,S$GLB,, | Performed by: NURSE PRACTITIONER

## 2021-10-28 PROCEDURE — G0439 PPPS, SUBSEQ VISIT: HCPCS | Mod: HCNC,S$GLB,, | Performed by: NURSE PRACTITIONER

## 2021-10-28 PROCEDURE — 3008F BODY MASS INDEX DOCD: CPT | Mod: HCNC,CPTII,S$GLB, | Performed by: NURSE PRACTITIONER

## 2021-12-02 DIAGNOSIS — E78.5 DYSLIPIDEMIA: ICD-10-CM

## 2021-12-02 DIAGNOSIS — I25.10 CORONARY ARTERY DISEASE INVOLVING NATIVE CORONARY ARTERY OF NATIVE HEART WITHOUT ANGINA PECTORIS: Primary | ICD-10-CM

## 2021-12-02 RX ORDER — NITROGLYCERIN 0.4 MG/1
0.4 TABLET SUBLINGUAL EVERY 5 MIN PRN
Qty: 25 TABLET | Refills: 4 | Status: SHIPPED | OUTPATIENT
Start: 2021-12-02 | End: 2023-12-08

## 2021-12-30 NOTE — TELEPHONE ENCOUNTER
----- Message from Ericarti Avila sent at 12/30/2021  4:07 PM CST -----  Contact: pt  Type:  RX Refill Request    Who Called:  pt  Refill or New Rx:  refill   RX Name and Strength:  valsartan-hydrochlorothiazide (DIOVAN-HCT) 160-12.5 mg per tablet  How is the patient currently taking it? (ex. 1XDay):  1xady  Is this a 30 day or 90 day RX:  90  Preferred Pharmacy with phone number:    [id number] - [place] [address] & [place] [place] [id number]  Phone: [phone] Fax: [phone]    Local or Mail Order:  local   Ordering Provider:  adriel Velasco Call Back Number:  [phone]  Additional Information:  thanks

## 2022-01-03 RX ORDER — VALSARTAN AND HYDROCHLOROTHIAZIDE 160; 12.5 MG/1; MG/1
TABLET, FILM COATED ORAL
Qty: 90 TABLET | Refills: 4 | Status: SHIPPED | OUTPATIENT
Start: 2022-01-03 | End: 2022-02-17 | Stop reason: SDUPTHER

## 2022-01-05 DIAGNOSIS — R00.1 BRADYCARDIA: Chronic | ICD-10-CM

## 2022-01-05 DIAGNOSIS — I10 ESSENTIAL HYPERTENSION: Chronic | ICD-10-CM

## 2022-01-05 DIAGNOSIS — E78.5 DYSLIPIDEMIA: ICD-10-CM

## 2022-01-05 DIAGNOSIS — Z95.5 STATUS POST CORONARY ARTERY STENT PLACEMENT: Chronic | ICD-10-CM

## 2022-01-06 RX ORDER — CLOPIDOGREL BISULFATE 75 MG/1
TABLET ORAL
Qty: 90 TABLET | Refills: 4 | Status: SHIPPED | OUTPATIENT
Start: 2022-01-06 | End: 2023-04-05

## 2022-02-10 ENCOUNTER — CLINICAL SUPPORT (OUTPATIENT)
Dept: CARDIOLOGY | Facility: HOSPITAL | Age: 72
End: 2022-02-10
Attending: INTERNAL MEDICINE
Payer: MEDICARE

## 2022-02-10 ENCOUNTER — LAB VISIT (OUTPATIENT)
Dept: LAB | Facility: HOSPITAL | Age: 72
End: 2022-02-10
Attending: INTERNAL MEDICINE
Payer: MEDICARE

## 2022-02-10 VITALS — WEIGHT: 204 LBS | BODY MASS INDEX: 30.21 KG/M2 | HEIGHT: 69 IN

## 2022-02-10 DIAGNOSIS — I25.10 CORONARY ARTERY DISEASE INVOLVING NATIVE CORONARY ARTERY OF NATIVE HEART WITHOUT ANGINA PECTORIS: ICD-10-CM

## 2022-02-10 DIAGNOSIS — E78.5 DYSLIPIDEMIA: ICD-10-CM

## 2022-02-10 LAB
ALBUMIN SERPL BCP-MCNC: 4.2 G/DL (ref 3.5–5.2)
ALP SERPL-CCNC: 43 U/L (ref 55–135)
ALT SERPL W/O P-5'-P-CCNC: 40 U/L (ref 10–44)
ANION GAP SERPL CALC-SCNC: 12 MMOL/L (ref 8–16)
AST SERPL-CCNC: 26 U/L (ref 10–40)
BASOPHILS # BLD AUTO: 0.04 K/UL (ref 0–0.2)
BASOPHILS NFR BLD: 0.5 % (ref 0–1.9)
BILIRUB SERPL-MCNC: 0.7 MG/DL (ref 0.1–1)
BUN SERPL-MCNC: 27 MG/DL (ref 8–23)
CALCIUM SERPL-MCNC: 10.1 MG/DL (ref 8.7–10.5)
CHLORIDE SERPL-SCNC: 103 MMOL/L (ref 95–110)
CHOLEST SERPL-MCNC: 158 MG/DL (ref 120–199)
CHOLEST/HDLC SERPL: 4.8 {RATIO} (ref 2–5)
CO2 SERPL-SCNC: 27 MMOL/L (ref 23–29)
CREAT SERPL-MCNC: 1.4 MG/DL (ref 0.5–1.4)
DIFFERENTIAL METHOD: NORMAL
EOSINOPHIL # BLD AUTO: 0.1 K/UL (ref 0–0.5)
EOSINOPHIL NFR BLD: 1.1 % (ref 0–8)
ERYTHROCYTE [DISTWIDTH] IN BLOOD BY AUTOMATED COUNT: 12.6 % (ref 11.5–14.5)
EST. GFR  (AFRICAN AMERICAN): 57.2 ML/MIN/1.73 M^2
EST. GFR  (NON AFRICAN AMERICAN): 49.5 ML/MIN/1.73 M^2
GLUCOSE SERPL-MCNC: 104 MG/DL (ref 70–110)
HCT VFR BLD AUTO: 47.6 % (ref 40–54)
HDLC SERPL-MCNC: 33 MG/DL (ref 40–75)
HDLC SERPL: 20.9 % (ref 20–50)
HGB BLD-MCNC: 16.1 G/DL (ref 14–18)
IMM GRANULOCYTES # BLD AUTO: 0.03 K/UL (ref 0–0.04)
IMM GRANULOCYTES NFR BLD AUTO: 0.4 % (ref 0–0.5)
LDLC SERPL CALC-MCNC: 76.2 MG/DL (ref 63–159)
LYMPHOCYTES # BLD AUTO: 1.4 K/UL (ref 1–4.8)
LYMPHOCYTES NFR BLD: 18.6 % (ref 18–48)
MCH RBC QN AUTO: 31 PG (ref 27–31)
MCHC RBC AUTO-ENTMCNC: 33.8 G/DL (ref 32–36)
MCV RBC AUTO: 92 FL (ref 82–98)
MONOCYTES # BLD AUTO: 0.6 K/UL (ref 0.3–1)
MONOCYTES NFR BLD: 7.2 % (ref 4–15)
NEUTROPHILS # BLD AUTO: 5.5 K/UL (ref 1.8–7.7)
NEUTROPHILS NFR BLD: 72.2 % (ref 38–73)
NONHDLC SERPL-MCNC: 125 MG/DL
NRBC BLD-RTO: 0 /100 WBC
PLATELET # BLD AUTO: 229 K/UL (ref 150–450)
PMV BLD AUTO: 12.5 FL (ref 9.2–12.9)
POTASSIUM SERPL-SCNC: 4.2 MMOL/L (ref 3.5–5.1)
PROT SERPL-MCNC: 7.4 G/DL (ref 6–8.4)
RBC # BLD AUTO: 5.19 M/UL (ref 4.6–6.2)
SODIUM SERPL-SCNC: 142 MMOL/L (ref 136–145)
TRIGL SERPL-MCNC: 244 MG/DL (ref 30–150)
WBC # BLD AUTO: 7.6 K/UL (ref 3.9–12.7)

## 2022-02-10 PROCEDURE — 93306 TTE W/DOPPLER COMPLETE: CPT | Mod: HCNC,PO

## 2022-02-10 PROCEDURE — 36415 COLL VENOUS BLD VENIPUNCTURE: CPT | Mod: HCNC,PO | Performed by: INTERNAL MEDICINE

## 2022-02-10 PROCEDURE — 80053 COMPREHEN METABOLIC PANEL: CPT | Mod: HCNC | Performed by: INTERNAL MEDICINE

## 2022-02-10 PROCEDURE — 93306 TTE W/DOPPLER COMPLETE: CPT | Mod: 26,HCNC,, | Performed by: INTERNAL MEDICINE

## 2022-02-10 PROCEDURE — 85025 COMPLETE CBC W/AUTO DIFF WBC: CPT | Mod: HCNC | Performed by: INTERNAL MEDICINE

## 2022-02-10 PROCEDURE — 80061 LIPID PANEL: CPT | Mod: HCNC | Performed by: INTERNAL MEDICINE

## 2022-02-10 PROCEDURE — 93306 ECHO (CUPID ONLY): ICD-10-PCS | Mod: 26,HCNC,, | Performed by: INTERNAL MEDICINE

## 2022-02-11 LAB
ASCENDING AORTA: 3.63 CM
AV INDEX (PROSTH): 0.75
AV MEAN GRADIENT: 4 MMHG
AV PEAK GRADIENT: 7 MMHG
AV VALVE AREA: 2.47 CM2
AV VELOCITY RATIO: 0.71
BSA FOR ECHO PROCEDURE: 2.12 M2
CV ECHO LV RWT: 0.4 CM
DOP CALC AO PEAK VEL: 1.34 M/S
DOP CALC AO VTI: 35.42 CM
DOP CALC LVOT AREA: 3.3 CM2
DOP CALC LVOT DIAMETER: 2.05 CM
DOP CALC LVOT PEAK VEL: 0.95 M/S
DOP CALC LVOT STROKE VOLUME: 87.42 CM3
DOP CALCLVOT PEAK VEL VTI: 26.5 CM
E WAVE DECELERATION TIME: 158.45 MSEC
E/A RATIO: 0.82
E/E' RATIO: 9 M/S
ECHO LV POSTERIOR WALL: 1.03 CM (ref 0.6–1.1)
EJECTION FRACTION: 55 %
FRACTIONAL SHORTENING: 36 % (ref 28–44)
INTERVENTRICULAR SEPTUM: 0.95 CM (ref 0.6–1.1)
LA MAJOR: 4.92 CM
LA MINOR: 5.16 CM
LA WIDTH: 3.72 CM
LEFT ATRIUM SIZE: 3.6 CM
LEFT ATRIUM VOLUME INDEX: 27.6 ML/M2
LEFT ATRIUM VOLUME: 57.34 CM3
LEFT INTERNAL DIMENSION IN SYSTOLE: 3.29 CM (ref 2.1–4)
LEFT VENTRICLE DIASTOLIC VOLUME INDEX: 59.75 ML/M2
LEFT VENTRICLE DIASTOLIC VOLUME: 124.29 ML
LEFT VENTRICLE MASS INDEX: 89 G/M2
LEFT VENTRICLE SYSTOLIC VOLUME INDEX: 21.1 ML/M2
LEFT VENTRICLE SYSTOLIC VOLUME: 43.88 ML
LEFT VENTRICULAR INTERNAL DIMENSION IN DIASTOLE: 5.11 CM (ref 3.5–6)
LEFT VENTRICULAR MASS: 186.11 G
LV LATERAL E/E' RATIO: 7.88 M/S
LV SEPTAL E/E' RATIO: 10.5 M/S
MV A" WAVE DURATION": 10.47 MSEC
MV PEAK A VEL: 0.77 M/S
MV PEAK E VEL: 0.63 M/S
MV STENOSIS PRESSURE HALF TIME: 45.95 MS
MV VALVE AREA P 1/2 METHOD: 4.79 CM2
PISA TR MAX VEL: 2.69 M/S
PULM VEIN S/D RATIO: 1.37
PV PEAK D VEL: 0.49 M/S
PV PEAK S VEL: 0.67 M/S
RA MAJOR: 4.88 CM
RA PRESSURE: 3 MMHG
RA WIDTH: 3.09 CM
RIGHT VENTRICULAR END-DIASTOLIC DIMENSION: 3.01 CM
RV TISSUE DOPPLER FREE WALL SYSTOLIC VELOCITY 1 (APICAL 4 CHAMBER VIEW): 16.82 CM/S
SINUS: 3.25 CM
STJ: 3.05 CM
TDI LATERAL: 0.08 M/S
TDI SEPTAL: 0.06 M/S
TDI: 0.07 M/S
TR MAX PG: 29 MMHG
TRICUSPID ANNULAR PLANE SYSTOLIC EXCURSION: 2.34 CM
TV REST PULMONARY ARTERY PRESSURE: 32 MMHG

## 2022-02-17 ENCOUNTER — OFFICE VISIT (OUTPATIENT)
Dept: CARDIOLOGY | Facility: CLINIC | Age: 72
End: 2022-02-17
Payer: MEDICARE

## 2022-02-17 VITALS
DIASTOLIC BLOOD PRESSURE: 75 MMHG | BODY MASS INDEX: 30.3 KG/M2 | WEIGHT: 204.56 LBS | SYSTOLIC BLOOD PRESSURE: 140 MMHG | HEIGHT: 69 IN | HEART RATE: 50 BPM

## 2022-02-17 DIAGNOSIS — I25.10 CORONARY ARTERY DISEASE INVOLVING NATIVE CORONARY ARTERY OF NATIVE HEART WITHOUT ANGINA PECTORIS: Chronic | ICD-10-CM

## 2022-02-17 DIAGNOSIS — M79.674 PAIN IN RIGHT TOE(S): ICD-10-CM

## 2022-02-17 DIAGNOSIS — I10 ESSENTIAL HYPERTENSION: Chronic | ICD-10-CM

## 2022-02-17 DIAGNOSIS — N18.30 STAGE 3 CHRONIC KIDNEY DISEASE, UNSPECIFIED WHETHER STAGE 3A OR 3B CKD: ICD-10-CM

## 2022-02-17 DIAGNOSIS — Z95.5 STATUS POST CORONARY ARTERY STENT PLACEMENT: Primary | Chronic | ICD-10-CM

## 2022-02-17 DIAGNOSIS — R00.1 BRADYCARDIA: Chronic | ICD-10-CM

## 2022-02-17 DIAGNOSIS — E78.5 DYSLIPIDEMIA: ICD-10-CM

## 2022-02-17 PROCEDURE — 1126F AMNT PAIN NOTED NONE PRSNT: CPT | Mod: HCNC,CPTII,S$GLB, | Performed by: INTERNAL MEDICINE

## 2022-02-17 PROCEDURE — 3077F PR MOST RECENT SYSTOLIC BLOOD PRESSURE >= 140 MM HG: ICD-10-PCS | Mod: HCNC,CPTII,S$GLB, | Performed by: INTERNAL MEDICINE

## 2022-02-17 PROCEDURE — 1160F PR REVIEW ALL MEDS BY PRESCRIBER/CLIN PHARMACIST DOCUMENTED: ICD-10-PCS | Mod: HCNC,CPTII,S$GLB, | Performed by: INTERNAL MEDICINE

## 2022-02-17 PROCEDURE — 3078F DIAST BP <80 MM HG: CPT | Mod: HCNC,CPTII,S$GLB, | Performed by: INTERNAL MEDICINE

## 2022-02-17 PROCEDURE — 1159F PR MEDICATION LIST DOCUMENTED IN MEDICAL RECORD: ICD-10-PCS | Mod: HCNC,CPTII,S$GLB, | Performed by: INTERNAL MEDICINE

## 2022-02-17 PROCEDURE — 99999 PR PBB SHADOW E&M-EST. PATIENT-LVL III: CPT | Mod: PBBFAC,HCNC,, | Performed by: INTERNAL MEDICINE

## 2022-02-17 PROCEDURE — 99214 PR OFFICE/OUTPT VISIT, EST, LEVL IV, 30-39 MIN: ICD-10-PCS | Mod: HCNC,S$GLB,, | Performed by: INTERNAL MEDICINE

## 2022-02-17 PROCEDURE — 3008F BODY MASS INDEX DOCD: CPT | Mod: HCNC,CPTII,S$GLB, | Performed by: INTERNAL MEDICINE

## 2022-02-17 PROCEDURE — 1159F MED LIST DOCD IN RCRD: CPT | Mod: HCNC,CPTII,S$GLB, | Performed by: INTERNAL MEDICINE

## 2022-02-17 PROCEDURE — 99214 OFFICE O/P EST MOD 30 MIN: CPT | Mod: HCNC,S$GLB,, | Performed by: INTERNAL MEDICINE

## 2022-02-17 PROCEDURE — 1160F RVW MEDS BY RX/DR IN RCRD: CPT | Mod: HCNC,CPTII,S$GLB, | Performed by: INTERNAL MEDICINE

## 2022-02-17 PROCEDURE — 99999 PR PBB SHADOW E&M-EST. PATIENT-LVL III: ICD-10-PCS | Mod: PBBFAC,HCNC,, | Performed by: INTERNAL MEDICINE

## 2022-02-17 PROCEDURE — 3077F SYST BP >= 140 MM HG: CPT | Mod: HCNC,CPTII,S$GLB, | Performed by: INTERNAL MEDICINE

## 2022-02-17 PROCEDURE — 1126F PR PAIN SEVERITY QUANTIFIED, NO PAIN PRESENT: ICD-10-PCS | Mod: HCNC,CPTII,S$GLB, | Performed by: INTERNAL MEDICINE

## 2022-02-17 PROCEDURE — 3078F PR MOST RECENT DIASTOLIC BLOOD PRESSURE < 80 MM HG: ICD-10-PCS | Mod: HCNC,CPTII,S$GLB, | Performed by: INTERNAL MEDICINE

## 2022-02-17 PROCEDURE — 3008F PR BODY MASS INDEX (BMI) DOCUMENTED: ICD-10-PCS | Mod: HCNC,CPTII,S$GLB, | Performed by: INTERNAL MEDICINE

## 2022-02-17 RX ORDER — PRAVASTATIN SODIUM 40 MG/1
40 TABLET ORAL NIGHTLY
Qty: 90 TABLET | Refills: 3 | Status: SHIPPED | OUTPATIENT
Start: 2022-02-17 | End: 2023-01-17

## 2022-02-17 RX ORDER — EZETIMIBE 10 MG/1
10 TABLET ORAL DAILY
Qty: 90 TABLET | Refills: 4 | Status: SHIPPED | OUTPATIENT
Start: 2022-02-17 | End: 2022-04-22 | Stop reason: SDUPTHER

## 2022-02-17 RX ORDER — VALSARTAN AND HYDROCHLOROTHIAZIDE 160; 12.5 MG/1; MG/1
1 TABLET, FILM COATED ORAL DAILY
Qty: 90 TABLET | Refills: 4 | Status: SHIPPED | OUTPATIENT
Start: 2022-02-17 | End: 2022-02-23

## 2022-02-17 RX ORDER — AMLODIPINE BESYLATE 5 MG/1
5 TABLET ORAL NIGHTLY
Qty: 90 TABLET | Refills: 4 | Status: SHIPPED | OUTPATIENT
Start: 2022-02-17 | End: 2023-04-10

## 2022-02-17 RX ORDER — COLCHICINE 0.6 MG/1
TABLET ORAL
Qty: 30 TABLET | Refills: 2 | Status: SHIPPED | OUTPATIENT
Start: 2022-02-17 | End: 2023-01-19

## 2022-02-17 NOTE — PROGRESS NOTES
Subjective:    Patient ID:  Alfonso Ewing is a 71 y.o. male who presents for follow-up of Coronary Artery Disease and Results      HPI  Here for follow up of CAD-PCI(2011)/low risk abn stress test. Remians very active but c/o extreme fatigue and ESTRADA. No CP.     Review of Systems   Constitutional: Negative for malaise/fatigue.   Eyes: Negative for blurred vision.   Cardiovascular: Negative for chest pain, claudication, cyanosis, dyspnea on exertion, irregular heartbeat, leg swelling, near-syncope, orthopnea, palpitations, paroxysmal nocturnal dyspnea and syncope.   Respiratory: Negative for cough and shortness of breath.    Hematologic/Lymphatic: Does not bruise/bleed easily.   Musculoskeletal: Negative for back pain, falls, joint pain, muscle cramps, muscle weakness and myalgias.   Gastrointestinal: Negative for abdominal pain, change in bowel habit, nausea and vomiting.   Genitourinary: Negative for urgency.   Neurological: Negative for dizziness, focal weakness and light-headedness.       Past Medical History:   Diagnosis Date    Allergy     Anticoagulant long-term use     ASA 81 mg, Plavix    BPH (benign prostatic hyperplasia)     BPH (benign prostatic hypertrophy)     Bradycardia     chronic since 2012    Bradycardia 6/21/2012    CAD (coronary artery disease) 2/13/2012 07/11/2011 mid LAD, 80% stenosis; mid LCX, 60% stenosis; mid RCA, 80%       stenosis;                                                                   11/14/2000 LAD, luminal irregularities; LCX, luminal irregularities;        RCA, occluded;                                                                  Cataract     ou done//    Coronary artery disease     2 stents    Dyslipidemia 2/13/2012    hx increased LFT while on niaspan an  vytorin. Myalgia with lipitor, zocor.     Dyspnea on exertion     stable, chronic    Essential hypertension 2/13/2012    Gout     HEARING LOSS     SEVERE -  No hearing aids anymore    History of  myocardial infarction 2000    Hypertension     Nephrolithiasis 1980    passed it on his own    Otitis media     Sleep apnea     use cpap    Status post coronary artery stent placement 6/21/2012                                        Previous PCI:                                                               S/P coated stent to LAD, 07/29/2011                                         S/P coated stent to RCA, 07/29/2011                                         S/P stent to RCA, 11/14/2000           There are no hospital problems to display for this patient.       Objective:     Vitals:    02/17/22 0958   BP: (!) 140/75   Pulse: (!) 50        Physical Exam  Vitals and nursing note reviewed.   Constitutional:       Appearance: He is well-developed and well-nourished.   HENT:      Head: Normocephalic and atraumatic.   Eyes:      Conjunctiva/sclera: Conjunctivae normal.      Right eye: No exudate.     Left eye: No exudate.  Neck:      Thyroid: No thyromegaly.      Vascular: Normal carotid pulses. No carotid bruit or JVD.   Cardiovascular:      Rate and Rhythm: Normal rate and regular rhythm.      Pulses: Intact distal pulses.           Carotid pulses are 2+ on the right side and 2+ on the left side.       Radial pulses are 2+ on the right side and 2+ on the left side.        Dorsalis pedis pulses are 2+ on the right side and 2+ on the left side.        Posterior tibial pulses are 2+ on the right side and 2+ on the left side.      Heart sounds: Normal heart sounds.   Pulmonary:      Effort: Pulmonary effort is normal.      Breath sounds: Normal breath sounds.   Abdominal:      General: Bowel sounds are normal.      Palpations: Abdomen is soft.   Musculoskeletal:         General: No edema. Normal range of motion.      Cervical back: Normal range of motion and neck supple.   Skin:     General: Skin is warm, dry and intact.      Nails: There is no clubbing or cyanosis.   Neurological:      Mental Status: He is alert and  oriented to person, place, and time.      Gait: Gait normal.   Psychiatric:         Mood and Affect: Mood and affect normal.         Speech: Speech normal.         Behavior: Behavior normal. Behavior is cooperative.         Thought Content: Thought content normal.         Judgment: Judgment normal.               ..    Chemistry        Component Value Date/Time     02/10/2022 0816    K 4.2 02/10/2022 0816     02/10/2022 0816    CO2 27 02/10/2022 0816    BUN 27 (H) 02/10/2022 0816    CREATININE 1.4 02/10/2022 0816     02/10/2022 0816        Component Value Date/Time    CALCIUM 10.1 02/10/2022 0816    ALKPHOS 43 (L) 02/10/2022 0816    AST 26 02/10/2022 0816    ALT 40 02/10/2022 0816    BILITOT 0.7 02/10/2022 0816    ESTGFRAFRICA 57.2 (A) 02/10/2022 0816    EGFRNONAA 49.5 (A) 02/10/2022 0816            ..  Lab Results   Component Value Date    CHOL 158 02/10/2022    CHOL 153 01/06/2021    CHOL 199 07/27/2020     Lab Results   Component Value Date    HDL 33 (L) 02/10/2022    HDL 39 (L) 01/06/2021    HDL 41 07/27/2020     Lab Results   Component Value Date    LDLCALC 76.2 02/10/2022    LDLCALC 79.8 01/06/2021    LDLCALC 80.6 07/27/2020     Lab Results   Component Value Date    TRIG 244 (H) 02/10/2022    TRIG 171 (H) 01/06/2021    TRIG 387 (H) 07/27/2020     Lab Results   Component Value Date    CHOLHDL 20.9 02/10/2022    CHOLHDL 25.5 01/06/2021    CHOLHDL 20.6 07/27/2020     ..  Lab Results   Component Value Date    WBC 7.60 02/10/2022    HGB 16.1 02/10/2022    HCT 47.6 02/10/2022    MCV 92 02/10/2022     02/10/2022       Test(s) Reviewed  I have reviewed the following in detail:  [] Stress test   [] Angiography   [x] Echocardiogram   [x] Labs   [] Other:       Assessment:         ICD-10-CM ICD-9-CM   1. Status post coronary artery stent placement  Z95.5 V45.82   2. Essential hypertension  I10 401.9   3. Dyslipidemia  E78.5 272.4   4. Bradycardia  R00.1 427.89   5. Coronary artery disease  involving native coronary artery of native heart without angina pectoris  I25.10 414.01   6. Stage 3 chronic kidney disease, unspecified whether stage 3a or 3b CKD  N18.30 585.3     Active Problem List with Overview Notes    Diagnosis Date Noted    CKD (chronic kidney disease) stage 3, GFR 30-59 ml/min 07/15/2020    Acquired cyst of kidney 07/15/2020    Prostate cancer 05/27/2020    Elevated PSA 02/10/2020    Colon cancer screening 01/31/2019    Abnormal thallium stress test 01/26/2018    Senile nuclear sclerosis 11/24/2015    SNHL (sensorineural hearing loss) 07/10/2012    Status post coronary artery stent placement 06/21/2012                                          Previous PCI:                                                                S/P coated stent to LAD, 07/29/2011                                          S/P coated stent to RCA, 07/29/2011                                          S/P stent to RCA, 11/14/2000            Bradycardia 06/21/2012    CAD (coronary artery disease) 02/13/2012 07/11/2011 mid LAD, 80% stenosis; mid LCX, 60% stenosis; mid RCA, 80%        stenosis;                                                                    11/14/2000 LAD, luminal irregularities; LCX, luminal irregularities;         RCA, occluded;                                                                     Dyslipidemia 02/13/2012     hx increased LFT while on niaspan an  vytorin. Myalgia with lipitor, zocor.      Essential hypertension 02/13/2012        Plan:           Return to clinic 6 months   Low level/low impact aerobic exercise 5x's/wk. Heart healthy diet and risk factor modification.    See labs and med orders.  TM nuc eval for chronotropic insuff vs ischemia due to CAD/bradycardia    Portions of this note may have been created with voice recognition software.  Grammatical, syntax and spelling errors may be inevitable.

## 2022-02-23 ENCOUNTER — LAB VISIT (OUTPATIENT)
Dept: LAB | Facility: HOSPITAL | Age: 72
End: 2022-02-23
Attending: INTERNAL MEDICINE
Payer: MEDICARE

## 2022-02-23 ENCOUNTER — OFFICE VISIT (OUTPATIENT)
Dept: FAMILY MEDICINE | Facility: CLINIC | Age: 72
End: 2022-02-23
Payer: MEDICARE

## 2022-02-23 VITALS
HEIGHT: 69 IN | DIASTOLIC BLOOD PRESSURE: 84 MMHG | TEMPERATURE: 98 F | BODY MASS INDEX: 30.33 KG/M2 | OXYGEN SATURATION: 97 % | SYSTOLIC BLOOD PRESSURE: 138 MMHG | WEIGHT: 204.81 LBS | HEART RATE: 69 BPM

## 2022-02-23 DIAGNOSIS — I10 ESSENTIAL HYPERTENSION: ICD-10-CM

## 2022-02-23 DIAGNOSIS — M10.00 IDIOPATHIC GOUT, UNSPECIFIED CHRONICITY, UNSPECIFIED SITE: ICD-10-CM

## 2022-02-23 DIAGNOSIS — C61 MALIGNANT NEOPLASM OF PROSTATE: ICD-10-CM

## 2022-02-23 DIAGNOSIS — E78.5 DYSLIPIDEMIA: ICD-10-CM

## 2022-02-23 DIAGNOSIS — Z00.00 ROUTINE PHYSICAL EXAMINATION: Primary | ICD-10-CM

## 2022-02-23 LAB
COMPLEXED PSA SERPL-MCNC: <0.01 NG/ML (ref 0–4)
URATE SERPL-MCNC: 8.2 MG/DL (ref 3.4–7)

## 2022-02-23 PROCEDURE — 1101F PR PT FALLS ASSESS DOC 0-1 FALLS W/OUT INJ PAST YR: ICD-10-PCS | Mod: HCNC,CPTII,S$GLB, | Performed by: INTERNAL MEDICINE

## 2022-02-23 PROCEDURE — 1159F MED LIST DOCD IN RCRD: CPT | Mod: HCNC,CPTII,S$GLB, | Performed by: INTERNAL MEDICINE

## 2022-02-23 PROCEDURE — 84550 ASSAY OF BLOOD/URIC ACID: CPT | Mod: HCNC | Performed by: INTERNAL MEDICINE

## 2022-02-23 PROCEDURE — 1125F PR PAIN SEVERITY QUANTIFIED, PAIN PRESENT: ICD-10-PCS | Mod: HCNC,CPTII,S$GLB, | Performed by: INTERNAL MEDICINE

## 2022-02-23 PROCEDURE — 84153 ASSAY OF PSA TOTAL: CPT | Mod: HCNC | Performed by: INTERNAL MEDICINE

## 2022-02-23 PROCEDURE — 99999 PR PBB SHADOW E&M-EST. PATIENT-LVL III: CPT | Mod: PBBFAC,HCNC,, | Performed by: INTERNAL MEDICINE

## 2022-02-23 PROCEDURE — 3288F PR FALLS RISK ASSESSMENT DOCUMENTED: ICD-10-PCS | Mod: HCNC,CPTII,S$GLB, | Performed by: INTERNAL MEDICINE

## 2022-02-23 PROCEDURE — 3079F DIAST BP 80-89 MM HG: CPT | Mod: HCNC,CPTII,S$GLB, | Performed by: INTERNAL MEDICINE

## 2022-02-23 PROCEDURE — 3075F SYST BP GE 130 - 139MM HG: CPT | Mod: HCNC,CPTII,S$GLB, | Performed by: INTERNAL MEDICINE

## 2022-02-23 PROCEDURE — 1160F PR REVIEW ALL MEDS BY PRESCRIBER/CLIN PHARMACIST DOCUMENTED: ICD-10-PCS | Mod: HCNC,CPTII,S$GLB, | Performed by: INTERNAL MEDICINE

## 2022-02-23 PROCEDURE — 99499 RISK ADDL DX/OHS AUDIT: ICD-10-PCS | Mod: HCNC,S$GLB,, | Performed by: INTERNAL MEDICINE

## 2022-02-23 PROCEDURE — 1101F PT FALLS ASSESS-DOCD LE1/YR: CPT | Mod: HCNC,CPTII,S$GLB, | Performed by: INTERNAL MEDICINE

## 2022-02-23 PROCEDURE — 3008F BODY MASS INDEX DOCD: CPT | Mod: HCNC,CPTII,S$GLB, | Performed by: INTERNAL MEDICINE

## 2022-02-23 PROCEDURE — 36415 COLL VENOUS BLD VENIPUNCTURE: CPT | Mod: HCNC,PO | Performed by: INTERNAL MEDICINE

## 2022-02-23 PROCEDURE — 99397 PR PREVENTIVE VISIT,EST,65 & OVER: ICD-10-PCS | Mod: HCNC,S$GLB,, | Performed by: INTERNAL MEDICINE

## 2022-02-23 PROCEDURE — 3008F PR BODY MASS INDEX (BMI) DOCUMENTED: ICD-10-PCS | Mod: HCNC,CPTII,S$GLB, | Performed by: INTERNAL MEDICINE

## 2022-02-23 PROCEDURE — 99397 PER PM REEVAL EST PAT 65+ YR: CPT | Mod: HCNC,S$GLB,, | Performed by: INTERNAL MEDICINE

## 2022-02-23 PROCEDURE — 3075F PR MOST RECENT SYSTOLIC BLOOD PRESS GE 130-139MM HG: ICD-10-PCS | Mod: HCNC,CPTII,S$GLB, | Performed by: INTERNAL MEDICINE

## 2022-02-23 PROCEDURE — 99999 PR PBB SHADOW E&M-EST. PATIENT-LVL III: ICD-10-PCS | Mod: PBBFAC,HCNC,, | Performed by: INTERNAL MEDICINE

## 2022-02-23 PROCEDURE — 1160F RVW MEDS BY RX/DR IN RCRD: CPT | Mod: HCNC,CPTII,S$GLB, | Performed by: INTERNAL MEDICINE

## 2022-02-23 PROCEDURE — 3288F FALL RISK ASSESSMENT DOCD: CPT | Mod: HCNC,CPTII,S$GLB, | Performed by: INTERNAL MEDICINE

## 2022-02-23 PROCEDURE — 99499 UNLISTED E&M SERVICE: CPT | Mod: HCNC,S$GLB,, | Performed by: INTERNAL MEDICINE

## 2022-02-23 PROCEDURE — 3079F PR MOST RECENT DIASTOLIC BLOOD PRESSURE 80-89 MM HG: ICD-10-PCS | Mod: HCNC,CPTII,S$GLB, | Performed by: INTERNAL MEDICINE

## 2022-02-23 PROCEDURE — 1125F AMNT PAIN NOTED PAIN PRSNT: CPT | Mod: HCNC,CPTII,S$GLB, | Performed by: INTERNAL MEDICINE

## 2022-02-23 PROCEDURE — 1159F PR MEDICATION LIST DOCUMENTED IN MEDICAL RECORD: ICD-10-PCS | Mod: HCNC,CPTII,S$GLB, | Performed by: INTERNAL MEDICINE

## 2022-02-23 RX ORDER — VALSARTAN AND HYDROCHLOROTHIAZIDE 320; 12.5 MG/1; MG/1
1 TABLET, FILM COATED ORAL DAILY
Qty: 90 TABLET | Refills: 3 | Status: SHIPPED | OUTPATIENT
Start: 2022-02-23 | End: 2023-02-20

## 2022-02-23 NOTE — PROGRESS NOTES
Subjective:       Patient ID: Alfonso Ewing is a 71 y.o. male.    Chief Complaint: Annual Exam    Here for routine health maintenance.      Not seen in 3 yr 2nd to covid fears.  Had COVID 9/2021; ready for vaccine.  Discussed.    Gout - rare attacks.  Uric acid mildly high.  Controlled with prn colchicine      Had an episode of bradycardia during c/scp; EKG was normal.  History of HR staying in 40s well known to cardiology.  No dizziness, near syncope or weakness.  Monitors at home and staying the same.   CAD s/p MI s/p stents x2.  Abnormal stress test /124/19.  No chest pain.  Dr Borrero     HTN - uncontrolled.      HLD - borderline controlled goal < 70 LDL on Zetia (CAN) and 20 mg pravastatin.  Previous high dose statin caused muscle pain.     Prostate cancer s/p surgical removal 2020.  Lost to f/u with urology 2nd to COVID.      Review of Systems   Constitutional: Negative for appetite change and fever.   HENT: Negative for nosebleeds and trouble swallowing.    Eyes: Negative for discharge and visual disturbance.   Respiratory: Negative for choking and shortness of breath.    Cardiovascular: Negative for chest pain and palpitations.   Gastrointestinal: Negative for abdominal pain, nausea and vomiting.   Musculoskeletal: Negative for arthralgias and joint swelling.   Skin: Negative for rash and wound.   Neurological: Negative for dizziness and syncope.   Psychiatric/Behavioral: Negative for confusion and dysphoric mood.       Objective:      Vitals:    02/23/22 0906   BP: 138/84   Pulse: 69   Temp: 97.6 °F (36.4 °C)     Physical Exam  Vitals reviewed.   Eyes:      Conjunctiva/sclera: Conjunctivae normal.   Neck:      Thyroid: No thyromegaly.      Trachea: Trachea normal.   Cardiovascular:      Heart sounds: Normal heart sounds.      Comments: Edema negative  Pulmonary:      Effort: Pulmonary effort is normal.      Breath sounds: Normal breath sounds.   Abdominal:      Palpations: Abdomen is soft. There is no  hepatomegaly.   Musculoskeletal:      Cervical back: Normal range of motion.   Skin:     General: Skin is warm and dry.   Neurological:      Cranial Nerves: No cranial nerve deficit.      Comments: DTR decreased bilateral   Psychiatric:      Comments: Alert and Oriented            Assessment:       1. Routine physical examination    2. Essential hypertension    3. Dyslipidemia    4. Malignant neoplasm of prostate    5. Idiopathic gout, unspecified chronicity, unspecified site        Plan:       Routine physical examination    Essential hypertension  -     valsartan-hydrochlorothiazide (DIOVAN-HCT) 320-12.5 mg per tablet; Take 1 tablet by mouth once daily.  Dispense: 90 tablet; Refill: 3    Dyslipidemia    Malignant neoplasm of prostate  -     Prostate Specific Antigen, Diagnostic; Future; Expected date: 02/23/2022    Idiopathic gout, unspecified chronicity, unspecified site  -     Uric Acid; Future; Expected date: 02/23/2022            Medication List with Changes/Refills   New Medications    VALSARTAN-HYDROCHLOROTHIAZIDE (DIOVAN-HCT) 320-12.5 MG PER TABLET    Take 1 tablet by mouth once daily.   Current Medications    AMLODIPINE (NORVASC) 5 MG TABLET    Take 1 tablet (5 mg total) by mouth every evening.    ASPIRIN (ECOTRIN) 81 MG EC TABLET    Take 81 mg by mouth every evening. HOLDING UNTIL INST BY MD    CLOPIDOGREL (PLAVIX) 75 MG TABLET    TAKE 1 TABLET(75 MG) BY MOUTH EVERY DAY    COLCHICINE (COLCRYS) 0.6 MG TABLET    1 tab now and 1 at bedtime, then 1 tab daily.    EZETIMIBE (ZETIA) 10 MG TABLET    Take 1 tablet (10 mg total) by mouth once daily.    NITROGLYCERIN (NITROSTAT) 0.4 MG SL TABLET    Place 1 tablet (0.4 mg total) under the tongue every 5 (five) minutes as needed for Chest pain.    PRAVASTATIN (PRAVACHOL) 40 MG TABLET    Take 1 tablet (40 mg total) by mouth every evening.    SILDENAFIL (VIAGRA) 100 MG TABLET    Take 1 tablet (100 mg total) by mouth daily as needed for erectile dysfunction.    TADALAFIL  (ADCIRCA) 20 MG TAB    TAKE 1 TABLET BY MOUTH AS NEEDED   Discontinued Medications    VALSARTAN-HYDROCHLOROTHIAZIDE (DIOVAN-HCT) 160-12.5 MG PER TABLET    Take 1 tablet by mouth once daily.     Wellness reviewed    Continue current management and monitor.    Counseled on regular exercise, maintenance of a healthy weight, balanced diet rich in fruits/vegetables and lean protein, and avoidance of unhealthy habits like smoking and excessive alcohol intake.   Also, counseled on importance of being compliant with medication, health appointments, diet and exercise.     Follow up in about 6 months (around 8/23/2022).

## 2022-02-25 ENCOUNTER — IMMUNIZATION (OUTPATIENT)
Dept: FAMILY MEDICINE | Facility: CLINIC | Age: 72
End: 2022-02-25
Payer: MEDICARE

## 2022-02-25 DIAGNOSIS — Z23 NEED FOR VACCINATION: Primary | ICD-10-CM

## 2022-02-25 PROCEDURE — 0001A COVID-19, MRNA, LNP-S, PF, 30 MCG/0.3 ML DOSE VACCINE: CPT | Mod: HCNC,PBBFAC | Performed by: FAMILY MEDICINE

## 2022-02-25 PROCEDURE — 91300 COVID-19, MRNA, LNP-S, PF, 30 MCG/0.3 ML DOSE VACCINE: CPT | Mod: HCNC,PBBFAC | Performed by: FAMILY MEDICINE

## 2022-02-28 ENCOUNTER — TELEPHONE (OUTPATIENT)
Dept: FAMILY MEDICINE | Facility: CLINIC | Age: 72
End: 2022-02-28
Payer: MEDICARE

## 2022-03-18 ENCOUNTER — IMMUNIZATION (OUTPATIENT)
Dept: FAMILY MEDICINE | Facility: CLINIC | Age: 72
End: 2022-03-18
Payer: MEDICARE

## 2022-03-18 DIAGNOSIS — Z23 NEED FOR VACCINATION: Primary | ICD-10-CM

## 2022-03-18 PROCEDURE — 91305 COVID-19, MRNA, LNP-S, PF, 30 MCG/0.3 ML DOSE VACCINE (PFIZER): CPT | Mod: S$GLB,,, | Performed by: FAMILY MEDICINE

## 2022-03-18 PROCEDURE — 0052A COVID-19, MRNA, LNP-S, PF, 30 MCG/0.3 ML DOSE VACCINE (PFIZER): ICD-10-PCS | Mod: S$GLB,,, | Performed by: FAMILY MEDICINE

## 2022-03-18 PROCEDURE — 91305 COVID-19, MRNA, LNP-S, PF, 30 MCG/0.3 ML DOSE VACCINE (PFIZER): ICD-10-PCS | Mod: S$GLB,,, | Performed by: FAMILY MEDICINE

## 2022-03-18 PROCEDURE — 0052A COVID-19, MRNA, LNP-S, PF, 30 MCG/0.3 ML DOSE VACCINE (PFIZER): CPT | Mod: S$GLB,,, | Performed by: FAMILY MEDICINE

## 2022-03-23 ENCOUNTER — HOSPITAL ENCOUNTER (OUTPATIENT)
Dept: RADIOLOGY | Facility: HOSPITAL | Age: 72
Discharge: HOME | End: 2022-03-23
Attending: INTERNAL MEDICINE
Payer: MEDICARE

## 2022-03-23 ENCOUNTER — CLINICAL SUPPORT (OUTPATIENT)
Dept: CARDIOLOGY | Facility: HOSPITAL | Age: 72
End: 2022-03-23
Attending: INTERNAL MEDICINE
Payer: MEDICARE

## 2022-03-23 VITALS — HEIGHT: 69 IN | WEIGHT: 204 LBS | BODY MASS INDEX: 30.21 KG/M2

## 2022-03-23 DIAGNOSIS — R00.1 BRADYCARDIA: ICD-10-CM

## 2022-03-23 DIAGNOSIS — R00.1 BRADYCARDIA: Chronic | ICD-10-CM

## 2022-03-23 DIAGNOSIS — Z95.5 STATUS POST CORONARY ARTERY STENT PLACEMENT: Chronic | ICD-10-CM

## 2022-03-23 DIAGNOSIS — Z95.5 STATUS POST CORONARY ARTERY STENT PLACEMENT: ICD-10-CM

## 2022-03-23 LAB
CV STRESS BASE HR: 48 BPM
DIASTOLIC BLOOD PRESSURE: 84 MMHG
NUC REST DIASTOLIC VOLUME INDEX: 102
NUC REST EJECTION FRACTION: 53
NUC REST SYSTOLIC VOLUME INDEX: 48
OHS CV CPX 1 MINUTE RECOVERY HEART RATE: 93 BPM
OHS CV CPX 85 PERCENT MAX PREDICTED HEART RATE MALE: 125
OHS CV CPX ESTIMATED METS: 10
OHS CV CPX MAX PREDICTED HEART RATE: 147
OHS CV CPX PATIENT IS FEMALE: 0
OHS CV CPX PATIENT IS MALE: 1
OHS CV CPX PEAK DIASTOLIC BLOOD PRESSURE: 71 MMHG
OHS CV CPX PEAK HEAR RATE: 118 BPM
OHS CV CPX PEAK RATE PRESSURE PRODUCT: NORMAL
OHS CV CPX PEAK SYSTOLIC BLOOD PRESSURE: 195 MMHG
OHS CV CPX PERCENT MAX PREDICTED HEART RATE ACHIEVED: 80
OHS CV CPX RATE PRESSURE PRODUCT PRESENTING: 7728
STRESS ECHO POST EXERCISE DUR MIN: 6 MINUTES
STRESS ECHO POST EXERCISE DUR SEC: 5 SECONDS
STRESS ST DEPRESSION: 0.5 MM
SYSTOLIC BLOOD PRESSURE: 161 MMHG

## 2022-03-23 PROCEDURE — 93225 XTRNL ECG REC<48 HRS REC: CPT | Mod: PO

## 2022-03-23 PROCEDURE — 93018 PR CARDIAC STRESS TST,INTERP/REPT ONLY: ICD-10-PCS | Mod: ,,, | Performed by: INTERNAL MEDICINE

## 2022-03-23 PROCEDURE — A9502 TC99M TETROFOSMIN: HCPCS | Mod: PO

## 2022-03-23 PROCEDURE — 93017 CV STRESS TEST TRACING ONLY: CPT | Mod: PO

## 2022-03-23 PROCEDURE — 78452 HT MUSCLE IMAGE SPECT MULT: CPT | Mod: 26,,, | Performed by: INTERNAL MEDICINE

## 2022-03-23 PROCEDURE — 93018 CV STRESS TEST I&R ONLY: CPT | Mod: ,,, | Performed by: INTERNAL MEDICINE

## 2022-03-23 PROCEDURE — 93227 XTRNL ECG REC<48 HR R&I: CPT | Mod: ,,, | Performed by: INTERNAL MEDICINE

## 2022-03-23 PROCEDURE — 78452 STRESS TEST WITH MYOCARDIAL PERFUSION (CUPID ONLY): ICD-10-PCS | Mod: 26,,, | Performed by: INTERNAL MEDICINE

## 2022-03-23 PROCEDURE — 93227 HOLTER MONITOR - 48 HOUR (CUPID ONLY): ICD-10-PCS | Mod: ,,, | Performed by: INTERNAL MEDICINE

## 2022-03-23 PROCEDURE — 93016 CV STRESS TEST SUPVJ ONLY: CPT | Mod: ,,, | Performed by: INTERNAL MEDICINE

## 2022-03-23 PROCEDURE — 93016 STRESS TEST WITH MYOCARDIAL PERFUSION (CUPID ONLY): ICD-10-PCS | Mod: ,,, | Performed by: INTERNAL MEDICINE

## 2022-03-30 LAB
OHS CV EVENT MONITOR DAY: 0
OHS CV HOLTER LENGTH DECIMAL HOURS: 48
OHS CV HOLTER LENGTH HOURS: 48
OHS CV HOLTER LENGTH MINUTES: 0
OHS CV HOLTER SINUS AVERAGE HR: 49
OHS CV HOLTER SINUS MAX HR: 132
OHS CV HOLTER SINUS MIN HR: 33

## 2022-03-31 ENCOUNTER — OFFICE VISIT (OUTPATIENT)
Dept: CARDIOLOGY | Facility: CLINIC | Age: 72
End: 2022-03-31
Payer: MEDICARE

## 2022-03-31 VITALS
HEIGHT: 69 IN | SYSTOLIC BLOOD PRESSURE: 174 MMHG | BODY MASS INDEX: 30.21 KG/M2 | DIASTOLIC BLOOD PRESSURE: 75 MMHG | HEART RATE: 48 BPM | WEIGHT: 204 LBS

## 2022-03-31 DIAGNOSIS — R94.39 ABNORMAL THALLIUM STRESS TEST: ICD-10-CM

## 2022-03-31 DIAGNOSIS — I25.10 CORONARY ARTERY DISEASE INVOLVING NATIVE CORONARY ARTERY OF NATIVE HEART, UNSPECIFIED WHETHER ANGINA PRESENT: Primary | ICD-10-CM

## 2022-03-31 DIAGNOSIS — Z95.5 STATUS POST CORONARY ARTERY STENT PLACEMENT: Chronic | ICD-10-CM

## 2022-03-31 DIAGNOSIS — E78.5 DYSLIPIDEMIA: ICD-10-CM

## 2022-03-31 DIAGNOSIS — N18.30 STAGE 3 CHRONIC KIDNEY DISEASE, UNSPECIFIED WHETHER STAGE 3A OR 3B CKD: ICD-10-CM

## 2022-03-31 DIAGNOSIS — R00.1 BRADYCARDIA: Chronic | ICD-10-CM

## 2022-03-31 DIAGNOSIS — R94.39 ABNORMAL NUCLEAR STRESS TEST: ICD-10-CM

## 2022-03-31 DIAGNOSIS — I10 ESSENTIAL HYPERTENSION: Chronic | ICD-10-CM

## 2022-03-31 PROCEDURE — 3078F DIAST BP <80 MM HG: CPT | Mod: CPTII,S$GLB,, | Performed by: PHYSICIAN ASSISTANT

## 2022-03-31 PROCEDURE — 1126F AMNT PAIN NOTED NONE PRSNT: CPT | Mod: CPTII,S$GLB,, | Performed by: PHYSICIAN ASSISTANT

## 2022-03-31 PROCEDURE — 99214 OFFICE O/P EST MOD 30 MIN: CPT | Mod: S$GLB,,, | Performed by: PHYSICIAN ASSISTANT

## 2022-03-31 PROCEDURE — 99999 PR PBB SHADOW E&M-EST. PATIENT-LVL III: ICD-10-PCS | Mod: PBBFAC,,, | Performed by: PHYSICIAN ASSISTANT

## 2022-03-31 PROCEDURE — 3008F BODY MASS INDEX DOCD: CPT | Mod: CPTII,S$GLB,, | Performed by: PHYSICIAN ASSISTANT

## 2022-03-31 PROCEDURE — 1159F MED LIST DOCD IN RCRD: CPT | Mod: CPTII,S$GLB,, | Performed by: PHYSICIAN ASSISTANT

## 2022-03-31 PROCEDURE — 3078F PR MOST RECENT DIASTOLIC BLOOD PRESSURE < 80 MM HG: ICD-10-PCS | Mod: CPTII,S$GLB,, | Performed by: PHYSICIAN ASSISTANT

## 2022-03-31 PROCEDURE — 99999 PR PBB SHADOW E&M-EST. PATIENT-LVL III: CPT | Mod: PBBFAC,,, | Performed by: PHYSICIAN ASSISTANT

## 2022-03-31 PROCEDURE — 3077F SYST BP >= 140 MM HG: CPT | Mod: CPTII,S$GLB,, | Performed by: PHYSICIAN ASSISTANT

## 2022-03-31 PROCEDURE — 99214 PR OFFICE/OUTPT VISIT, EST, LEVL IV, 30-39 MIN: ICD-10-PCS | Mod: S$GLB,,, | Performed by: PHYSICIAN ASSISTANT

## 2022-03-31 PROCEDURE — 1126F PR PAIN SEVERITY QUANTIFIED, NO PAIN PRESENT: ICD-10-PCS | Mod: CPTII,S$GLB,, | Performed by: PHYSICIAN ASSISTANT

## 2022-03-31 PROCEDURE — 3077F PR MOST RECENT SYSTOLIC BLOOD PRESSURE >= 140 MM HG: ICD-10-PCS | Mod: CPTII,S$GLB,, | Performed by: PHYSICIAN ASSISTANT

## 2022-03-31 PROCEDURE — 1159F PR MEDICATION LIST DOCUMENTED IN MEDICAL RECORD: ICD-10-PCS | Mod: CPTII,S$GLB,, | Performed by: PHYSICIAN ASSISTANT

## 2022-03-31 PROCEDURE — 3008F PR BODY MASS INDEX (BMI) DOCUMENTED: ICD-10-PCS | Mod: CPTII,S$GLB,, | Performed by: PHYSICIAN ASSISTANT

## 2022-03-31 RX ORDER — SODIUM CHLORIDE 9 MG/ML
INJECTION, SOLUTION INTRAVENOUS ONCE
Status: CANCELLED | OUTPATIENT
Start: 2022-03-31 | End: 2022-03-31

## 2022-03-31 RX ORDER — SODIUM CHLORIDE 0.9 % (FLUSH) 0.9 %
10 SYRINGE (ML) INJECTION
Status: CANCELLED | OUTPATIENT
Start: 2022-03-31

## 2022-03-31 NOTE — PATIENT INSTRUCTIONS
Angiogram 5/9/22 at 9 am    Arrive for procedure at:     You will receive a phone call from [facility] Pre-Op Department with further instructions prior to your scheduled procedure.    Notify the nurse if you are ALLERGIC TO IODINE.    FASTING: You MAY NOT have anything to eat or drink AFTER MIDNIGHT the day before your procedure. If your procedure is scheduled in the afternoon, you may have a LIGHT BREAKFAST 6-8 hours prior to your procedure.  For example: Two slices of toast; black coffee or black tea.    MEDICATIONS: You may take your regular morning medications with water. If there are any medications that you should not take, you will be instructed to hold them for that morning.    CARDIOLOGY PRE-PROCEDURE MEDICATION ORDERS:  ** Please hold any medications that are checked below:    HOLD   # OF DAYS TO HOLD  Coumadin   Consult with Coumadin Clinic   Xarelto    _DAY BEFORE & DAY OF_  Pradaxa  _ DAY BEFORE & DAY OF _  Eliquis   _ DAY BEFORE & DAY OF _  Metformin    Day before procedure & morning of procedure  Short acting insulin   Morning of procedure    CONTINUE the Following Medications   Plavix      Effient     Aspirin    WHAT TO EXPECT:    How long will the procedure take?  The procedure will take an average of 1 - 2 hours to perform.  After the procedure, you will need to lay flat for around 4 - 6 hours to minimize bleeding from the puncture site. If the wrist is accessed you will need to keep your arm still as instructed by the nurse.    When can I go home?  You may be able to be discharged home that same afternoon if there were no complications.  If you have one of the following: balloon; stent; pacemaker or defibrillator procedures, you may spend one night for observation.  Your doctor will determine your discharge based upon your progress.  The results of your procedure will be discussed with you before you are discharged.  Any further testing or procedures will be scheduled for  you either before you leave or you will be instructed to call for a future appointment.      TRANSPORTATION:  PLEASE ARRANGE TO HAVE SOMEONE DRIVE YOU HOME FOLLOWING YOUR PROCEDURE, YOU WILL NOT BE ALLOWED TO DRIVE.

## 2022-03-31 NOTE — PROGRESS NOTES
Subjective:    Patient ID:  Alfonso Ewing is a 71 y.o. male who presents for follow-up of CAD.       HPI  Mr. Ewing is a very pleasant gentleman who follows with Dr. Borrero.  He presents today to discuss his recent test results. He saw Dr. Borrero in February and was c/o fatigue. He has a hx of a mildly abnormal stress test in 2018 as well as chronic sinus bradycardia (asymptomatic). An exercise nuclear stress test was ordered to assess for chronotropic competence and ischemia. It showed normal exercise capacity with normal HR response and a moderate intensity, small to moderate sized, reversible perfusion abnormality that is consistent with ischemia in the basal to mid lateral wall.     He also had a Holter that showed:  · Predominant Rhythm Sinus bradycardia with heart rates varying between 33 and 132 BPM with an average of 49 BPM.  · Ventricular Arrhythmias There were frequent PVCs totalling 1627 and averaging 33.9 per hour. The ventricular arrhythmias percentage was 1.2.  · Supraventricular Arrhythmias There were frequent PACs totalling 4267 and averaging 88.9 per hour.  · No significant clinical arrhythmia appreciated.    Recent echo in February revealed:   · The left ventricle is normal in size with normal systolic function.  · The estimated ejection fraction is 55%.  · Grade I left ventricular diastolic dysfunction.  · Normal right ventricular size with normal right ventricular systolic function.  · Mild left atrial enlargement.  · Mild mitral regurgitation.  · Mild tricuspid regurgitation.  · Normal central venous pressure (3 mmHg).  · The estimated PA systolic pressure is 32 mmHg.      Review of Systems   Constitutional: Positive for malaise/fatigue. Negative for chills, diaphoresis, fever, weight gain and weight loss.   HENT: Negative for sore throat.    Eyes: Negative for blurred vision, vision loss in left eye, vision loss in right eye and visual disturbance.   Cardiovascular: Negative for chest pain,  "claudication, dyspnea on exertion, leg swelling, near-syncope, orthopnea, palpitations, paroxysmal nocturnal dyspnea and syncope.   Respiratory: Negative for cough, hemoptysis, shortness of breath, sputum production and wheezing.    Endocrine: Negative for cold intolerance and heat intolerance.   Hematologic/Lymphatic: Negative for adenopathy. Does not bruise/bleed easily.   Skin: Negative for rash.   Musculoskeletal: Negative for falls, muscle weakness and myalgias.   Gastrointestinal: Negative for abdominal pain, change in bowel habit, constipation, diarrhea, melena and nausea.   Genitourinary: Negative for bladder incontinence.   Neurological: Negative for dizziness, focal weakness, headaches, light-headedness, numbness and weakness.   Psychiatric/Behavioral: Negative for altered mental status.         Vitals:    03/31/22 0831   BP: (!) 174/75   BP Location: Right arm   Patient Position: Sitting   BP Method: Medium (Automatic)   Pulse: (!) 48   Weight: 92.5 kg (204 lb)   Height: 5' 9" (1.753 m)   Body mass index is 30.13 kg/m².    Objective:    Physical Exam  Constitutional:       Appearance: He is well-developed.   HENT:      Head: Normocephalic and atraumatic.   Eyes:      Pupils: Pupils are equal, round, and reactive to light.   Neck:      Thyroid: No thyromegaly.      Vascular: No JVD.      Trachea: No tracheal deviation.   Cardiovascular:      Rate and Rhythm: Normal rate and regular rhythm.      Chest Wall: PMI is not displaced.      Pulses: Normal pulses and intact distal pulses.      Heart sounds: S1 normal and S2 normal. No murmur heard.    No friction rub. No gallop.   Pulmonary:      Effort: Pulmonary effort is normal. No respiratory distress.      Breath sounds: Normal breath sounds. No wheezing or rales.   Chest:      Chest wall: No tenderness.   Abdominal:      General: Bowel sounds are normal. There is no distension.      Palpations: Abdomen is soft. There is no mass.      Tenderness: There is no " abdominal tenderness.   Musculoskeletal:         General: No tenderness. Normal range of motion.      Cervical back: Neck supple.   Skin:     General: Skin is warm and dry.      Findings: No rash.   Neurological:      Mental Status: He is alert and oriented to person, place, and time.   Psychiatric:         Behavior: Behavior normal.           Assessment:          ICD-10-CM ICD-9-CM   1. Status post coronary artery stent placement Z95.5 V45.82   2. Coronary artery disease involving native coronary artery of native heart without angina pectoris I25.10 414.01   3. Essential hypertension I10 401.9   4. Dyslipidemia E78.5 272.4          Problem List Items Addressed This Visit           Status post coronary artery stent placement - Primary (Chronic)      Overview                                             Previous PCI:                                                                S/P coated stent to LAD, 07/29/2011                                          S/P coated stent to RCA, 07/29/2011                                          S/P stent to RCA, 11/14/2000                  Essential hypertension (Chronic)      Dyslipidemia      Overview        hx increased LFT while on niaspan an  vytorin. Myalgia with lipitor, zocor.            CAD (coronary artery disease) (Chronic)      Overview        07/11/2011 mid LAD, 80% stenosis; mid LCX, 60% stenosis; mid RCA, 80%        stenosis;                                                                    11/14/2000 LAD, luminal irregularities; LCX, luminal irregularities;         RCA, occluded;                             Plan:       Will schedule coronary angiography +/- PCI.   Risks, benefits, and alternatives of cardiac catheterization and possible intervention were discussed in detail with the patient. Questions were answered. He is agreeable to proceed.  Continue current cardiac medications including ASA and Plavix.   Risk factor modification including diet and exercise.    Can reassess need for PPM after angiogram.   F/U with Dr. Borrero in 3-4 months.

## 2022-04-22 DIAGNOSIS — E78.5 DYSLIPIDEMIA: ICD-10-CM

## 2022-04-22 DIAGNOSIS — R00.1 BRADYCARDIA: Chronic | ICD-10-CM

## 2022-04-22 DIAGNOSIS — I10 ESSENTIAL HYPERTENSION: Chronic | ICD-10-CM

## 2022-04-22 DIAGNOSIS — Z95.5 STATUS POST CORONARY ARTERY STENT PLACEMENT: Chronic | ICD-10-CM

## 2022-04-22 RX ORDER — EZETIMIBE 10 MG/1
10 TABLET ORAL DAILY
Qty: 90 TABLET | Refills: 3 | Status: SHIPPED | OUTPATIENT
Start: 2022-04-22 | End: 2023-04-17

## 2022-04-22 NOTE — TELEPHONE ENCOUNTER
----- Message from Kelsea Natalie sent at 4/22/2022  3:56 PM CDT -----  Contact: Wife  Type:  RX Refill Request    Who Called:  Patients Wife  Refill or New Rx:  New Rx  RX Name and Strength:  ezetimibe (ZETIA) 10 mg tablet  How is the patient currently taking it? (ex. 1XDay):  1XDay  Is this a 30 day or 90 day RX:  90  Preferred Pharmacy with phone number:    [facility] [address] [address]  [address] [address] [address]  Phone: [phone] Fax: [phone]  Local or Mail Order:  Local  Ordering Provider:  Dr. Gissell Velasco Call Back Number:  [phone] (home)   Additional Information:  Pts wife stated they went to call in his refill for this medication, but the pharmacy stated the doctor had discontinued the refills. He has one pill left for this afternoon and then he will be out. The prescription had 3 more fills to it, but they won't refill. Can we call in another prescription with refills since the pharmacy thinks it was cancelled by Dr. Borrero. Thank You.

## 2022-04-27 ENCOUNTER — OFFICE VISIT (OUTPATIENT)
Dept: UROLOGY | Facility: CLINIC | Age: 72
End: 2022-04-27
Payer: MEDICARE

## 2022-04-27 VITALS — HEIGHT: 69 IN | WEIGHT: 204 LBS | BODY MASS INDEX: 30.21 KG/M2

## 2022-04-27 DIAGNOSIS — C61 PROSTATE CANCER: Primary | ICD-10-CM

## 2022-04-27 DIAGNOSIS — N52.31 ERECTILE DYSFUNCTION AFTER RADICAL PROSTATECTOMY: ICD-10-CM

## 2022-04-27 PROCEDURE — 1159F MED LIST DOCD IN RCRD: CPT | Mod: CPTII,S$GLB,, | Performed by: UROLOGY

## 2022-04-27 PROCEDURE — 3288F FALL RISK ASSESSMENT DOCD: CPT | Mod: CPTII,S$GLB,, | Performed by: UROLOGY

## 2022-04-27 PROCEDURE — 99214 OFFICE O/P EST MOD 30 MIN: CPT | Mod: S$GLB,,, | Performed by: UROLOGY

## 2022-04-27 PROCEDURE — 99999 PR PBB SHADOW E&M-EST. PATIENT-LVL III: ICD-10-PCS | Mod: PBBFAC,,, | Performed by: UROLOGY

## 2022-04-27 PROCEDURE — 1101F PT FALLS ASSESS-DOCD LE1/YR: CPT | Mod: CPTII,S$GLB,, | Performed by: UROLOGY

## 2022-04-27 PROCEDURE — 1101F PR PT FALLS ASSESS DOC 0-1 FALLS W/OUT INJ PAST YR: ICD-10-PCS | Mod: CPTII,S$GLB,, | Performed by: UROLOGY

## 2022-04-27 PROCEDURE — 1160F PR REVIEW ALL MEDS BY PRESCRIBER/CLIN PHARMACIST DOCUMENTED: ICD-10-PCS | Mod: CPTII,S$GLB,, | Performed by: UROLOGY

## 2022-04-27 PROCEDURE — 3008F BODY MASS INDEX DOCD: CPT | Mod: CPTII,S$GLB,, | Performed by: UROLOGY

## 2022-04-27 PROCEDURE — 99214 PR OFFICE/OUTPT VISIT, EST, LEVL IV, 30-39 MIN: ICD-10-PCS | Mod: S$GLB,,, | Performed by: UROLOGY

## 2022-04-27 PROCEDURE — 1126F PR PAIN SEVERITY QUANTIFIED, NO PAIN PRESENT: ICD-10-PCS | Mod: CPTII,S$GLB,, | Performed by: UROLOGY

## 2022-04-27 PROCEDURE — 1160F RVW MEDS BY RX/DR IN RCRD: CPT | Mod: CPTII,S$GLB,, | Performed by: UROLOGY

## 2022-04-27 PROCEDURE — 3288F PR FALLS RISK ASSESSMENT DOCUMENTED: ICD-10-PCS | Mod: CPTII,S$GLB,, | Performed by: UROLOGY

## 2022-04-27 PROCEDURE — 99999 PR PBB SHADOW E&M-EST. PATIENT-LVL III: CPT | Mod: PBBFAC,,, | Performed by: UROLOGY

## 2022-04-27 PROCEDURE — 1126F AMNT PAIN NOTED NONE PRSNT: CPT | Mod: CPTII,S$GLB,, | Performed by: UROLOGY

## 2022-04-27 PROCEDURE — 3008F PR BODY MASS INDEX (BMI) DOCUMENTED: ICD-10-PCS | Mod: CPTII,S$GLB,, | Performed by: UROLOGY

## 2022-04-27 PROCEDURE — 1159F PR MEDICATION LIST DOCUMENTED IN MEDICAL RECORD: ICD-10-PCS | Mod: CPTII,S$GLB,, | Performed by: UROLOGY

## 2022-04-27 RX ORDER — SILDENAFIL 100 MG/1
100 TABLET, FILM COATED ORAL DAILY PRN
Qty: 10 TABLET | Refills: 11 | Status: SHIPPED | OUTPATIENT
Start: 2022-04-27 | End: 2023-12-08

## 2022-04-27 NOTE — PROGRESS NOTES
Subjective:       Patient ID: Alfonso Ewing is a 72 y.o. male.    Chief Complaint: Benign Prostatic Hypertrophy    History of RALP 7/2020  PSA is undetectable  No voiding issues  Viagra for ED without issues    Past Medical History:   Diagnosis Date    Allergy     Anticoagulant long-term use     ASA 81 mg, Plavix    BPH (benign prostatic hyperplasia)     BPH (benign prostatic hypertrophy)     Bradycardia     chronic since 2012    Bradycardia 6/21/2012    CAD (coronary artery disease) 2/13/2012 07/11/2011 mid LAD, 80% stenosis; mid LCX, 60% stenosis; mid RCA, 80%       stenosis;                                                                   11/14/2000 LAD, luminal irregularities; LCX, luminal irregularities;        RCA, occluded;                                                                  Cataract     ou done//    Coronary artery disease     2 stents    Dyslipidemia 2/13/2012    hx increased LFT while on niaspan an  vytorin. Myalgia with lipitor, zocor.     Dyspnea on exertion     stable, chronic    Essential hypertension 2/13/2012    Gout     HEARING LOSS     SEVERE -  No hearing aids anymore    History of myocardial infarction 2000    Hypertension     Nephrolithiasis 1980    passed it on his own    Otitis media     Sleep apnea     use cpap    Status post coronary artery stent placement 6/21/2012                                        Previous PCI:                                                               S/P coated stent to LAD, 07/29/2011                                         S/P coated stent to RCA, 07/29/2011                                         S/P stent to RCA, 11/14/2000            Past Surgical History:   Procedure Laterality Date    CARDIAC CATHETERIZATION      2 stents    CATARACT EXTRACTION W/  INTRAOCULAR LENS IMPLANT Right 11/24/2015    By Dr Byrd    CATARACT EXTRACTION W/  INTRAOCULAR LENS IMPLANT Left 1/12/2016    [facility]    COLONOSCOPY      COLONOSCOPY  N/A 1/31/2019    Procedure: COLONOSCOPY;  Surgeon: Evan Basurto MD;  Location: [facility] ENDO;  Service: Endoscopy;  Laterality: N/A;    CORONARY STENT PLACEMENT      x 2    ROBOT-ASSISTED LAPAROSCOPIC PROSTATECTOMY USING DA VIVIEN XI N/A 7/31/2020    Procedure: XI ROBOTIC PROSTATECTOMY;  Surgeon: Ranjit Mckeon MD;  Location: [facility] OR;  Service: Urology;  Laterality: N/A;    ROBOT-ASSISTED LAPAROSCOPIC RETROPERITONEAL LYMPHADENECTOMY USING DA VIVIEN XI N/A 7/31/2020    Procedure: XI ROBOTIC LYMPHADENECTOMY, RETROPERITONEUM;  Surgeon: Ranjit Mckeon MD;  Location: [facility] OR;  Service: Urology;  Laterality: N/A;    TRANSRECTAL BIOPSY OF PROSTATE WITH ULTRASOUND GUIDANCE Bilateral 2/10/2020    Procedure: BIOPSY, PROSTATE, RECTAL APPROACH, WITH US GUIDANCE;  Surgeon: Tavares Thomas MD;  Location: [facility] OR;  Service: Urology;  Laterality: Bilateral;     Social History     Socioeconomic History    Marital status:     Number of children: 2   Occupational History    Occupation: retired retired     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     Comment:    Tobacco Use    Smoking status: Never Smoker    Smokeless tobacco: Former User     Types: Snuff     Quit date: 11/24/2005    Tobacco comment: dipped tobacco   Substance and Sexual Activity    Alcohol use: No    Drug use: No    Sexual activity: Yes     Partners: Female       Family History   Problem Relation Age of Onset    Stroke Mother     Kidney disease Mother     Glaucoma Mother     Heart failure Father     Heart disease Father     Hypertension Father     Hyperlipidemia Father     Glaucoma Brother     Diabetes Brother     Heart disease Brother     Hyperlipidemia Brother     Prostate cancer Brother     Cancer Sister     Hypertension Son     Heart disease Brother     Diabetes Brother     Hyperlipidemia Brother     Heart disease Brother     Hyperlipidemia Brother     Heart disease Brother      Hyperlipidemia Brother     Hypertension Brother     Hypertension Brother     Hyperlipidemia Brother     Heart disease Sister     Hypertension Sister     Hyperlipidemia Sister     Heart disease Sister     Hypertension Sister     Hyperlipidemia Sister     Heart disease Sister     Hypertension Sister     Hyperlipidemia Sister     Prostate cancer Sister     Amblyopia Neg Hx     Blindness Neg Hx     Cataracts Neg Hx     Macular degeneration Neg Hx     Retinal detachment Neg Hx     Strabismus Neg Hx     Thyroid disease Neg Hx       Review of patient's allergies indicates:   Allergen Reactions    Niacin Rash    Promethazine Nausea Only    Statins-hmg-coa reductase inhibitors Other (See Comments)     Other reaction(s): Muscle pain     Medication List with Changes/Refills   Current Medications    AMLODIPINE (NORVASC) 5 MG TABLET    Take 1 tablet (5 mg total) by mouth every evening.    ASPIRIN (ECOTRIN) 81 MG EC TABLET    Take 81 mg by mouth every evening. HOLDING UNTIL INST BY MD    CLOPIDOGREL (PLAVIX) 75 MG TABLET    TAKE 1 TABLET(75 MG) BY MOUTH EVERY DAY    COLCHICINE (COLCRYS) 0.6 MG TABLET    1 tab now and 1 at bedtime, then 1 tab daily.    EZETIMIBE (ZETIA) 10 MG TABLET    Take 1 tablet (10 mg total) by mouth once daily.    NITROGLYCERIN (NITROSTAT) 0.4 MG SL TABLET    Place 1 tablet (0.4 mg total) under the tongue every 5 (five) minutes as needed for Chest pain.    PRAVASTATIN (PRAVACHOL) 40 MG TABLET    Take 1 tablet (40 mg total) by mouth every evening.    SILDENAFIL (VIAGRA) 100 MG TABLET    Take 1 tablet (100 mg total) by mouth daily as needed for erectile dysfunction.    TADALAFIL (ADCIRCA) 20 MG TAB    TAKE 1 TABLET BY MOUTH AS NEEDED    VALSARTAN-HYDROCHLOROTHIAZIDE (DIOVAN-HCT) 320-12.5 MG PER TABLET    Take 1 tablet by mouth once daily.      Review of Systems   Constitutional: Negative for fatigue, fever and unexpected weight change.   HENT: Negative for congestion.    Eyes: Negative  for visual disturbance.   Respiratory: Negative for cough, choking and shortness of breath.    Cardiovascular: Negative for chest pain and leg swelling.   Gastrointestinal: Negative for abdominal distention, constipation, diarrhea, nausea and vomiting.   Endocrine: Negative for polyuria.   Genitourinary: Negative for difficulty urinating, flank pain, hematuria and testicular pain.   Musculoskeletal: Negative for back pain.   Skin: Negative for color change and wound.   Allergic/Immunologic: Negative for immunocompromised state.   Neurological: Negative for weakness.   Hematological: Negative for adenopathy. Does not bruise/bleed easily.   Psychiatric/Behavioral: Negative for confusion.       Objective:      Physical Exam  Vitals and nursing note reviewed.   Constitutional:       General: He is not in acute distress.     Appearance: He is well-developed. He is not diaphoretic.   HENT:      Head: Normocephalic and atraumatic.   Eyes:      Pupils: Pupils are equal, round, and reactive to light.   Cardiovascular:      Rate and Rhythm: Normal rate.   Pulmonary:      Effort: Pulmonary effort is normal. No respiratory distress.      Breath sounds: No wheezing.   Abdominal:      General: There is no distension.      Palpations: Abdomen is soft. There is no mass.      Tenderness: There is no abdominal tenderness. There is no guarding or rebound.      Hernia: There is no hernia in the right inguinal area or left inguinal area.   Genitourinary:     Penis: Normal and circumcised. No tenderness.       Testes: Normal.         Right: Tenderness or swelling not present.         Left: Tenderness or swelling not present.      Prostate: Normal. Not tender.      Rectum: No mass, tenderness, external hemorrhoid or internal hemorrhoid.      Comments: Prostate smooth  Musculoskeletal:      Cervical back: Neck supple.   Skin:     General: Skin is warm.   Neurological:      Mental Status: He is alert and oriented to person, place, and time.  "          Sodium   Date Value Ref Range Status   02/10/2022 142 136 - 145 mmol/L Final     Potassium   Date Value Ref Range Status   02/10/2022 4.2 3.5 - 5.1 mmol/L Final     Chloride   Date Value Ref Range Status   02/10/2022 103 95 - 110 mmol/L Final     CO2   Date Value Ref Range Status   02/10/2022 27 23 - 29 mmol/L Final     Creatinine   Date Value Ref Range Status   02/10/2022 1.4 0.5 - 1.4 mg/dL Final     Glucose   Date Value Ref Range Status   02/10/2022 104 70 - 110 mg/dL Final     AST   Date Value Ref Range Status   02/10/2022 26 10 - 40 U/L Final     ALT   Date Value Ref Range Status   02/10/2022 40 10 - 44 U/L Final     WBC   Date Value Ref Range Status   02/10/2022 7.60 3.90 - 12.70 K/uL Final     Hemoglobin   Date Value Ref Range Status   02/10/2022 16.1 14.0 - 18.0 g/dL Final     Hematocrit   Date Value Ref Range Status   02/10/2022 47.6 40.0 - 54.0 % Final     Platelets   Date Value Ref Range Status   02/10/2022 229 150 - 450 K/uL Final     INR   Date Value Ref Range Status   07/27/2020 1.1  Final   07/11/2011 1.0 0.8 - 1.2 Final     Comment:     ACCP Guideline for Coumadin usage recommends a "target range" of  2.0 - 3.0 for INR for all indicators except mechanical heart valves  and antiphospholipid syndromes which should use 2.5 - 3.5.  .     PSA, Screen   Date Value Ref Range Status   12/10/2019 7.0 (H) 0.00 - 4.00 ng/mL Final     Comment:     PSA Expected levels:  Hormonal Therapy: <0.05 ng/ml  Prostatectomy: <0.01 ng/ml  Radiation Therapy: <1.00 ng/ml            Assessment/Plan: Prostate cancer - Stable s/p RALP 7/2020 with undetectable PSA                                  Erectile dysfunction - Stable on sildenafil 100 mg       Problem List Items Addressed This Visit    None           "

## 2022-06-08 ENCOUNTER — OFFICE VISIT (OUTPATIENT)
Dept: CARDIOLOGY | Facility: CLINIC | Age: 72
End: 2022-06-08
Payer: MEDICARE

## 2022-06-08 VITALS
HEIGHT: 70 IN | SYSTOLIC BLOOD PRESSURE: 164 MMHG | WEIGHT: 201.75 LBS | DIASTOLIC BLOOD PRESSURE: 88 MMHG | HEART RATE: 40 BPM | BODY MASS INDEX: 28.88 KG/M2

## 2022-06-08 DIAGNOSIS — Z95.5 STATUS POST CORONARY ARTERY STENT PLACEMENT: Chronic | ICD-10-CM

## 2022-06-08 DIAGNOSIS — R00.1 BRADYCARDIA: Chronic | ICD-10-CM

## 2022-06-08 DIAGNOSIS — N18.30 STAGE 3 CHRONIC KIDNEY DISEASE, UNSPECIFIED WHETHER STAGE 3A OR 3B CKD: ICD-10-CM

## 2022-06-08 DIAGNOSIS — I25.10 CORONARY ARTERY DISEASE INVOLVING NATIVE CORONARY ARTERY OF NATIVE HEART WITHOUT ANGINA PECTORIS: Primary | Chronic | ICD-10-CM

## 2022-06-08 DIAGNOSIS — I10 ESSENTIAL HYPERTENSION: Chronic | ICD-10-CM

## 2022-06-08 DIAGNOSIS — E78.5 DYSLIPIDEMIA: ICD-10-CM

## 2022-06-08 PROCEDURE — 3008F PR BODY MASS INDEX (BMI) DOCUMENTED: ICD-10-PCS | Mod: CPTII,S$GLB,, | Performed by: PHYSICIAN ASSISTANT

## 2022-06-08 PROCEDURE — 3008F BODY MASS INDEX DOCD: CPT | Mod: CPTII,S$GLB,, | Performed by: PHYSICIAN ASSISTANT

## 2022-06-08 PROCEDURE — 3079F DIAST BP 80-89 MM HG: CPT | Mod: CPTII,S$GLB,, | Performed by: PHYSICIAN ASSISTANT

## 2022-06-08 PROCEDURE — 99214 PR OFFICE/OUTPT VISIT, EST, LEVL IV, 30-39 MIN: ICD-10-PCS | Mod: S$GLB,,, | Performed by: PHYSICIAN ASSISTANT

## 2022-06-08 PROCEDURE — 1126F PR PAIN SEVERITY QUANTIFIED, NO PAIN PRESENT: ICD-10-PCS | Mod: CPTII,S$GLB,, | Performed by: PHYSICIAN ASSISTANT

## 2022-06-08 PROCEDURE — 1159F MED LIST DOCD IN RCRD: CPT | Mod: CPTII,S$GLB,, | Performed by: PHYSICIAN ASSISTANT

## 2022-06-08 PROCEDURE — 3077F PR MOST RECENT SYSTOLIC BLOOD PRESSURE >= 140 MM HG: ICD-10-PCS | Mod: CPTII,S$GLB,, | Performed by: PHYSICIAN ASSISTANT

## 2022-06-08 PROCEDURE — 99999 PR PBB SHADOW E&M-EST. PATIENT-LVL II: ICD-10-PCS | Mod: PBBFAC,,, | Performed by: PHYSICIAN ASSISTANT

## 2022-06-08 PROCEDURE — 99499 UNLISTED E&M SERVICE: CPT | Mod: S$GLB,,, | Performed by: PHYSICIAN ASSISTANT

## 2022-06-08 PROCEDURE — 1159F PR MEDICATION LIST DOCUMENTED IN MEDICAL RECORD: ICD-10-PCS | Mod: CPTII,S$GLB,, | Performed by: PHYSICIAN ASSISTANT

## 2022-06-08 PROCEDURE — 3077F SYST BP >= 140 MM HG: CPT | Mod: CPTII,S$GLB,, | Performed by: PHYSICIAN ASSISTANT

## 2022-06-08 PROCEDURE — 99499 RISK ADDL DX/OHS AUDIT: ICD-10-PCS | Mod: S$GLB,,, | Performed by: PHYSICIAN ASSISTANT

## 2022-06-08 PROCEDURE — 1126F AMNT PAIN NOTED NONE PRSNT: CPT | Mod: CPTII,S$GLB,, | Performed by: PHYSICIAN ASSISTANT

## 2022-06-08 PROCEDURE — 99999 PR PBB SHADOW E&M-EST. PATIENT-LVL II: CPT | Mod: PBBFAC,,, | Performed by: PHYSICIAN ASSISTANT

## 2022-06-08 PROCEDURE — 99214 OFFICE O/P EST MOD 30 MIN: CPT | Mod: S$GLB,,, | Performed by: PHYSICIAN ASSISTANT

## 2022-06-08 PROCEDURE — 3079F PR MOST RECENT DIASTOLIC BLOOD PRESSURE 80-89 MM HG: ICD-10-PCS | Mod: CPTII,S$GLB,, | Performed by: PHYSICIAN ASSISTANT

## 2022-06-08 NOTE — PROGRESS NOTES
"Subjective:    Patient ID:  Alfonso Ewing is a 72 y.o. male who presents for follow-up of CAD.       HPI   Mr. Ewing is a very pleasant gentleman who follows with Dr. Borrero.  He presents today for follow up after his recent angiogram. Angiogram showed:  Angiographic findings:  Left main coronary artery-medium size vessel and moderately calcified with mild disease less than 30%.  Left anterior descending-small vessel) apex diffuse disease seen throughout its entire course all less than 50% very small vessel distally.  Two long small diagonal diffuse disease all less than 20%.  Ramus intermedius-trivial sized vessel with ALIA 3 flow.  Circumflex-small medium-sized vessel subtotally occluded in its proximal portion with a 95% ostial.  Long branching 1st obtuse moderate diffuse disease less than 30% remaining circumflex of less than 40% stenosis.  Right coronary-long dominant vessel diffuse thinning seen from the proximal to distal portion with diffuse InStent restenoses with a significant area of proximal portion of the 90%.     Intervention:  Successful angioplasty and stenting of the ostial circumflex using GuideLiner backup support sequential balloon angioplasty using 1.5-2.0 balloon with osirio 2.25 x 18 stent placed back into the ostium portion with a 2.0 balloon in the LAD with inflation deflation with ALIA 3 flow pre and postprocedure 0% residual stenosis.     Successful angioplasty stenting of the circumflex using AngioSculpt cutting balloon followed by a 3.5 x 28 synergy with 0% residual stenosis ALIA 3 flow pre and postprocedure.     Impression:  Successful angioplasty and stenting with drug-eluting stent of circumflex as above  Successful angioplasty stenting of the right coronary with drug-eluting stent.      He notes no improvement in his fatigue since his PCI. He has chronic bradycardia and has been told in the past he "may need a pacemaker in the future".     He had a Holter that showed:  · Predominant " Rhythm Sinus bradycardia with heart rates varying between 33 and 132 BPM with an average of 49 BPM.  · Ventricular Arrhythmias There were frequent PVCs totalling 1627 and averaging 33.9 per hour. The ventricular arrhythmias percentage was 1.2.  · Supraventricular Arrhythmias There were frequent PACs totalling 4267 and averaging 88.9 per hour.  · No significant clinical arrhythmia appreciated.    Recent echo in February revealed:   · The left ventricle is normal in size with normal systolic function.  · The estimated ejection fraction is 55%.  · Grade I left ventricular diastolic dysfunction.  · Normal right ventricular size with normal right ventricular systolic function.  · Mild left atrial enlargement.  · Mild mitral regurgitation.  · Mild tricuspid regurgitation.  · Normal central venous pressure (3 mmHg).  · The estimated PA systolic pressure is 32 mmHg.    BP elevated today -- they saw his son, who drives an [employer], get into an accident on the way here. Luckily, he's fine.     Review of Systems   Constitutional: Positive for malaise/fatigue. Negative for chills, diaphoresis, fever, weight gain and weight loss.   HENT: Negative for sore throat.    Eyes: Negative for blurred vision, vision loss in left eye, vision loss in right eye and visual disturbance.   Cardiovascular: Negative for chest pain, claudication, dyspnea on exertion, leg swelling, near-syncope, orthopnea, palpitations, paroxysmal nocturnal dyspnea and syncope.   Respiratory: Negative for cough, hemoptysis, shortness of breath, sputum production and wheezing.    Endocrine: Negative for cold intolerance and heat intolerance.   Hematologic/Lymphatic: Negative for adenopathy. Does not bruise/bleed easily.   Skin: Negative for rash.   Musculoskeletal: Negative for falls, muscle weakness and myalgias.   Gastrointestinal: Negative for abdominal pain, change in bowel habit, constipation, diarrhea, melena and nausea.   Genitourinary: Negative for  "bladder incontinence.   Neurological: Negative for dizziness, focal weakness, headaches, light-headedness, numbness and weakness.   Psychiatric/Behavioral: Negative for altered mental status.         Vitals:    06/08/22 1210   BP: (!) 168/72   BP Location: Right arm   Patient Position: Sitting   BP Method: Medium (Automatic)   Pulse: (!) 40   Weight: 91.5 kg (201 lb 11.5 oz)   Height: 5' 10" (1.778 m)   Body mass index is 28.94 kg/m².    Objective:    Physical Exam  Constitutional:       Appearance: He is well-developed.   HENT:      Head: Normocephalic and atraumatic.   Eyes:      Pupils: Pupils are equal, round, and reactive to light.   Neck:      Thyroid: No thyromegaly.      Vascular: No JVD.      Trachea: No tracheal deviation.   Cardiovascular:      Rate and Rhythm: Normal rate and regular rhythm. Frequent extrasystoles are present.     Chest Wall: PMI is not displaced.      Pulses: Normal pulses and intact distal pulses.      Heart sounds: S1 normal and S2 normal. No murmur heard.    No friction rub. No gallop.   Pulmonary:      Effort: Pulmonary effort is normal. No respiratory distress.      Breath sounds: Normal breath sounds. No wheezing or rales.   Chest:      Chest wall: No tenderness.   Abdominal:      General: Bowel sounds are normal. There is no distension.      Palpations: Abdomen is soft. There is no mass.      Tenderness: There is no abdominal tenderness.   Musculoskeletal:         General: No tenderness. Normal range of motion.      Cervical back: Neck supple.      Comments: Access site C/D/I without hematoma. Cap refill < 2 sec.    Skin:     General: Skin is warm and dry.      Findings: No rash.   Neurological:      Mental Status: He is alert and oriented to person, place, and time.   Psychiatric:         Behavior: Behavior normal.           Assessment:       Problem List Items Addressed This Visit        Cardiology Problems    CAD (coronary artery disease) - Primary (Chronic)    Essential " hypertension (Chronic)       Other    Bradycardia (Chronic)    Status post coronary artery stent placement (Chronic)    CKD (chronic kidney disease) stage 3, GFR 30-59 ml/min    Dyslipidemia             Plan:       He is having frequent PVCs on exam, which may explain the lower HR today.   Continue current cardiac medications including ASA and Plavix.   Risk factor modification including diet and exercise.   F/U with Dr. Borrero as scheduled. Can discuss need for PPM at that time.

## 2022-06-10 ENCOUNTER — PES CALL (OUTPATIENT)
Dept: ADMINISTRATIVE | Facility: CLINIC | Age: 72
End: 2022-06-10
Payer: MEDICARE

## 2022-07-08 ENCOUNTER — PES CALL (OUTPATIENT)
Dept: ADMINISTRATIVE | Facility: CLINIC | Age: 72
End: 2022-07-08
Payer: MEDICARE

## 2022-08-02 ENCOUNTER — PES CALL (OUTPATIENT)
Dept: ADMINISTRATIVE | Facility: CLINIC | Age: 72
End: 2022-08-02
Payer: MEDICARE

## 2022-08-10 ENCOUNTER — PATIENT OUTREACH (OUTPATIENT)
Dept: ADMINISTRATIVE | Facility: HOSPITAL | Age: 72
End: 2022-08-10
Payer: MEDICARE

## 2022-08-10 NOTE — PROGRESS NOTES
Pre-Visit Chart Review  For Appointment Scheduled on 8/24/2022    Health Maintenance Due   Topic    COVID-19 Vaccine (3 - Booster for Pfizer series)

## 2022-08-24 ENCOUNTER — OFFICE VISIT (OUTPATIENT)
Dept: FAMILY MEDICINE | Facility: CLINIC | Age: 72
End: 2022-08-24
Payer: MEDICARE

## 2022-08-24 VITALS
HEIGHT: 70 IN | HEART RATE: 62 BPM | BODY MASS INDEX: 28.95 KG/M2 | DIASTOLIC BLOOD PRESSURE: 78 MMHG | WEIGHT: 202.19 LBS | OXYGEN SATURATION: 96 % | TEMPERATURE: 98 F | SYSTOLIC BLOOD PRESSURE: 126 MMHG

## 2022-08-24 DIAGNOSIS — Z12.5 ENCOUNTER FOR SCREENING FOR MALIGNANT NEOPLASM OF PROSTATE: ICD-10-CM

## 2022-08-24 DIAGNOSIS — E21.3 HYPERPARATHYROIDISM: ICD-10-CM

## 2022-08-24 DIAGNOSIS — E78.5 DYSLIPIDEMIA: ICD-10-CM

## 2022-08-24 DIAGNOSIS — I10 ESSENTIAL HYPERTENSION: Primary | ICD-10-CM

## 2022-08-24 DIAGNOSIS — Z85.46 HISTORY OF PROSTATE CANCER: ICD-10-CM

## 2022-08-24 PROCEDURE — 1159F MED LIST DOCD IN RCRD: CPT | Mod: CPTII,S$GLB,, | Performed by: INTERNAL MEDICINE

## 2022-08-24 PROCEDURE — 3288F PR FALLS RISK ASSESSMENT DOCUMENTED: ICD-10-PCS | Mod: CPTII,S$GLB,, | Performed by: INTERNAL MEDICINE

## 2022-08-24 PROCEDURE — 99499 RISK ADDL DX/OHS AUDIT: ICD-10-PCS | Mod: S$GLB,,, | Performed by: INTERNAL MEDICINE

## 2022-08-24 PROCEDURE — 1160F PR REVIEW ALL MEDS BY PRESCRIBER/CLIN PHARMACIST DOCUMENTED: ICD-10-PCS | Mod: CPTII,S$GLB,, | Performed by: INTERNAL MEDICINE

## 2022-08-24 PROCEDURE — 99214 PR OFFICE/OUTPT VISIT, EST, LEVL IV, 30-39 MIN: ICD-10-PCS | Mod: S$GLB,,, | Performed by: INTERNAL MEDICINE

## 2022-08-24 PROCEDURE — 1159F PR MEDICATION LIST DOCUMENTED IN MEDICAL RECORD: ICD-10-PCS | Mod: CPTII,S$GLB,, | Performed by: INTERNAL MEDICINE

## 2022-08-24 PROCEDURE — 1125F AMNT PAIN NOTED PAIN PRSNT: CPT | Mod: CPTII,S$GLB,, | Performed by: INTERNAL MEDICINE

## 2022-08-24 PROCEDURE — 1101F PR PT FALLS ASSESS DOC 0-1 FALLS W/OUT INJ PAST YR: ICD-10-PCS | Mod: CPTII,S$GLB,, | Performed by: INTERNAL MEDICINE

## 2022-08-24 PROCEDURE — 3078F PR MOST RECENT DIASTOLIC BLOOD PRESSURE < 80 MM HG: ICD-10-PCS | Mod: CPTII,S$GLB,, | Performed by: INTERNAL MEDICINE

## 2022-08-24 PROCEDURE — 1125F PR PAIN SEVERITY QUANTIFIED, PAIN PRESENT: ICD-10-PCS | Mod: CPTII,S$GLB,, | Performed by: INTERNAL MEDICINE

## 2022-08-24 PROCEDURE — 3288F FALL RISK ASSESSMENT DOCD: CPT | Mod: CPTII,S$GLB,, | Performed by: INTERNAL MEDICINE

## 2022-08-24 PROCEDURE — 1101F PT FALLS ASSESS-DOCD LE1/YR: CPT | Mod: CPTII,S$GLB,, | Performed by: INTERNAL MEDICINE

## 2022-08-24 PROCEDURE — 3074F SYST BP LT 130 MM HG: CPT | Mod: CPTII,S$GLB,, | Performed by: INTERNAL MEDICINE

## 2022-08-24 PROCEDURE — 99499 UNLISTED E&M SERVICE: CPT | Mod: S$GLB,,, | Performed by: INTERNAL MEDICINE

## 2022-08-24 PROCEDURE — 99214 OFFICE O/P EST MOD 30 MIN: CPT | Mod: S$GLB,,, | Performed by: INTERNAL MEDICINE

## 2022-08-24 PROCEDURE — 3074F PR MOST RECENT SYSTOLIC BLOOD PRESSURE < 130 MM HG: ICD-10-PCS | Mod: CPTII,S$GLB,, | Performed by: INTERNAL MEDICINE

## 2022-08-24 PROCEDURE — 99999 PR PBB SHADOW E&M-EST. PATIENT-LVL III: CPT | Mod: PBBFAC,,, | Performed by: INTERNAL MEDICINE

## 2022-08-24 PROCEDURE — 1160F RVW MEDS BY RX/DR IN RCRD: CPT | Mod: CPTII,S$GLB,, | Performed by: INTERNAL MEDICINE

## 2022-08-24 PROCEDURE — 3008F PR BODY MASS INDEX (BMI) DOCUMENTED: ICD-10-PCS | Mod: CPTII,S$GLB,, | Performed by: INTERNAL MEDICINE

## 2022-08-24 PROCEDURE — 3008F BODY MASS INDEX DOCD: CPT | Mod: CPTII,S$GLB,, | Performed by: INTERNAL MEDICINE

## 2022-08-24 PROCEDURE — 3078F DIAST BP <80 MM HG: CPT | Mod: CPTII,S$GLB,, | Performed by: INTERNAL MEDICINE

## 2022-08-24 PROCEDURE — 99999 PR PBB SHADOW E&M-EST. PATIENT-LVL III: ICD-10-PCS | Mod: PBBFAC,,, | Performed by: INTERNAL MEDICINE

## 2022-08-24 NOTE — PROGRESS NOTES
Subjective:       Patient ID: Alfonso Ewing is a 72 y.o. male.    Chief Complaint: Hypertension    Gout - rare attacks.  Uric acid mildly high.  Controlled with prn colchicine      CAD - failed repeat stress test, which led to 2 more stents 5/2022.  Hx MI s/p stents x2 prior.  Dr Borrero   Bradycardia - being evaluated by cardiology.  No LOC, lightheadedness, weakness    HTN - controlled.      HLD - borderline controlled goal < 70 LDL on Zetia (CAN) and 20 mg pravastatin.  Previous high dose statin caused muscle pain.     Prostate cancer s/p surgical removal 2020.  PSA undetectable      Review of Systems   Constitutional: Negative for appetite change and fever.   HENT: Negative for nosebleeds and trouble swallowing.    Eyes: Negative for discharge and visual disturbance.   Respiratory: Negative for choking and shortness of breath.    Cardiovascular: Negative for chest pain and palpitations.   Gastrointestinal: Negative for abdominal pain, nausea and vomiting.   Musculoskeletal: Negative for arthralgias and joint swelling.   Skin: Negative for rash and wound.   Neurological: Negative for dizziness and syncope.   Psychiatric/Behavioral: Negative for confusion and dysphoric mood.       Objective:      Vitals:    08/24/22 1051   BP: 126/78   Pulse: 62   Temp: 97.6 °F (36.4 °C)     Physical Exam  Vitals reviewed.   Eyes:      Conjunctiva/sclera: Conjunctivae normal.   Cardiovascular:      Rate and Rhythm: Normal rate and regular rhythm.   Pulmonary:      Effort: Pulmonary effort is normal.      Breath sounds: Normal breath sounds.   Musculoskeletal:      Cervical back: Normal range of motion.      Comments: Normal ROM bilateral    Skin:     General: Skin is warm and dry.   Neurological:      Cranial Nerves: No cranial nerve deficit (grossly intact).   Psychiatric:      Comments: Alert and orientated           Assessment:       1. Essential hypertension    2. Dyslipidemia    3. Hyperparathyroidism    4. Encounter for  screening for malignant neoplasm of prostate    5. History of prostate cancer        Plan:       Essential hypertension    Dyslipidemia    Hyperparathyroidism  -     CBC Auto Differential; Future; Expected date: 02/20/2023    Encounter for screening for malignant neoplasm of prostate  -     Prostate Specific Antigen, Diagnostic; Future; Expected date: 02/20/2023    History of prostate cancer  -     Prostate Specific Antigen, Diagnostic; Future; Expected date: 02/20/2023            Medication List with Changes/Refills   Current Medications    ACETAMINOPHEN (TYLENOL) 325 MG TABLET    Take 1 tablet (325 mg total) by mouth every 6 (six) hours as needed for Pain.    AMLODIPINE (NORVASC) 5 MG TABLET    Take 1 tablet (5 mg total) by mouth every evening.    ASPIRIN (ECOTRIN) 81 MG EC TABLET    Take 81 mg by mouth every evening. HOLDING UNTIL INST BY MD    CLOPIDOGREL (PLAVIX) 75 MG TABLET    TAKE 1 TABLET(75 MG) BY MOUTH EVERY DAY    COLCHICINE (COLCRYS) 0.6 MG TABLET    1 tab now and 1 at bedtime, then 1 tab daily.    EZETIMIBE (ZETIA) 10 MG TABLET    Take 1 tablet (10 mg total) by mouth once daily.    NITROGLYCERIN (NITROSTAT) 0.4 MG SL TABLET    Place 1 tablet (0.4 mg total) under the tongue every 5 (five) minutes as needed for Chest pain.    PRAVASTATIN (PRAVACHOL) 40 MG TABLET    Take 1 tablet (40 mg total) by mouth every evening.    SILDENAFIL (VIAGRA) 100 MG TABLET    Take 1 tablet (100 mg total) by mouth daily as needed for Erectile Dysfunction.    VALSARTAN-HYDROCHLOROTHIAZIDE (DIOVAN-HCT) 320-12.5 MG PER TABLET    Take 1 tablet by mouth once daily.     Other diagnoses were reviewed and found stable or previously improved and will continue to monitor.    Continue current management and monitor.    Counseled on regular exercise, maintenance of a healthy weight, balanced diet rich in fruits/vegetables and lean protein, and avoidance of unhealthy habits like smoking and excessive alcohol intake.   Also, counseled on  importance of being compliant with medication, health appointments, diet and exercise.     Follow up in about 6 months (around 2/24/2023).

## 2022-11-03 ENCOUNTER — TELEPHONE (OUTPATIENT)
Dept: ADMINISTRATIVE | Facility: CLINIC | Age: 72
End: 2022-11-03
Payer: MEDICARE

## 2022-11-03 NOTE — TELEPHONE ENCOUNTER
Called pt; no answer; could not leave a message due to voice mail not being set up; I was calling to confirm pt's in office EAV appt on 11/7/22

## 2022-11-07 ENCOUNTER — OFFICE VISIT (OUTPATIENT)
Dept: FAMILY MEDICINE | Facility: CLINIC | Age: 72
End: 2022-11-07
Payer: MEDICARE

## 2022-11-07 VITALS
HEART RATE: 45 BPM | DIASTOLIC BLOOD PRESSURE: 64 MMHG | HEIGHT: 70 IN | SYSTOLIC BLOOD PRESSURE: 128 MMHG | WEIGHT: 206.38 LBS | OXYGEN SATURATION: 96 % | BODY MASS INDEX: 29.54 KG/M2

## 2022-11-07 DIAGNOSIS — I65.22 STENOSIS OF LEFT CAROTID ARTERY: ICD-10-CM

## 2022-11-07 DIAGNOSIS — I10 ESSENTIAL HYPERTENSION: Chronic | ICD-10-CM

## 2022-11-07 DIAGNOSIS — I25.10 CORONARY ARTERY DISEASE INVOLVING NATIVE CORONARY ARTERY OF NATIVE HEART WITHOUT ANGINA PECTORIS: Chronic | ICD-10-CM

## 2022-11-07 DIAGNOSIS — I70.0 AORTIC ATHEROSCLEROSIS: ICD-10-CM

## 2022-11-07 DIAGNOSIS — N18.30 STAGE 3 CHRONIC KIDNEY DISEASE, UNSPECIFIED WHETHER STAGE 3A OR 3B CKD: ICD-10-CM

## 2022-11-07 DIAGNOSIS — E78.5 DYSLIPIDEMIA: ICD-10-CM

## 2022-11-07 DIAGNOSIS — E21.3 HYPERPARATHYROIDISM: ICD-10-CM

## 2022-11-07 DIAGNOSIS — C61 PROSTATE CANCER: ICD-10-CM

## 2022-11-07 DIAGNOSIS — Z00.00 ENCOUNTER FOR PREVENTIVE HEALTH EXAMINATION: Primary | ICD-10-CM

## 2022-11-07 PROCEDURE — 3288F PR FALLS RISK ASSESSMENT DOCUMENTED: ICD-10-PCS | Mod: CPTII,S$GLB,, | Performed by: NURSE PRACTITIONER

## 2022-11-07 PROCEDURE — G0008 FLU VACCINE - QUADRIVALENT - ADJUVANTED: ICD-10-PCS | Mod: S$GLB,,, | Performed by: NURSE PRACTITIONER

## 2022-11-07 PROCEDURE — G0439 PR MEDICARE ANNUAL WELLNESS SUBSEQUENT VISIT: ICD-10-PCS | Mod: S$GLB,,, | Performed by: NURSE PRACTITIONER

## 2022-11-07 PROCEDURE — 99999 PR PBB SHADOW E&M-EST. PATIENT-LVL IV: CPT | Mod: PBBFAC,,, | Performed by: NURSE PRACTITIONER

## 2022-11-07 PROCEDURE — G0439 PPPS, SUBSEQ VISIT: HCPCS | Mod: S$GLB,,, | Performed by: NURSE PRACTITIONER

## 2022-11-07 PROCEDURE — 1159F PR MEDICATION LIST DOCUMENTED IN MEDICAL RECORD: ICD-10-PCS | Mod: CPTII,S$GLB,, | Performed by: NURSE PRACTITIONER

## 2022-11-07 PROCEDURE — 3008F BODY MASS INDEX DOCD: CPT | Mod: CPTII,S$GLB,, | Performed by: NURSE PRACTITIONER

## 2022-11-07 PROCEDURE — 3008F PR BODY MASS INDEX (BMI) DOCUMENTED: ICD-10-PCS | Mod: CPTII,S$GLB,, | Performed by: NURSE PRACTITIONER

## 2022-11-07 PROCEDURE — 99999 PR PBB SHADOW E&M-EST. PATIENT-LVL IV: ICD-10-PCS | Mod: PBBFAC,,, | Performed by: NURSE PRACTITIONER

## 2022-11-07 PROCEDURE — 90694 FLU VACCINE - QUADRIVALENT - ADJUVANTED: ICD-10-PCS | Mod: S$GLB,,, | Performed by: NURSE PRACTITIONER

## 2022-11-07 PROCEDURE — 1101F PT FALLS ASSESS-DOCD LE1/YR: CPT | Mod: CPTII,S$GLB,, | Performed by: NURSE PRACTITIONER

## 2022-11-07 PROCEDURE — 90694 VACC AIIV4 NO PRSRV 0.5ML IM: CPT | Mod: S$GLB,,, | Performed by: NURSE PRACTITIONER

## 2022-11-07 PROCEDURE — 3078F PR MOST RECENT DIASTOLIC BLOOD PRESSURE < 80 MM HG: ICD-10-PCS | Mod: CPTII,S$GLB,, | Performed by: NURSE PRACTITIONER

## 2022-11-07 PROCEDURE — 3078F DIAST BP <80 MM HG: CPT | Mod: CPTII,S$GLB,, | Performed by: NURSE PRACTITIONER

## 2022-11-07 PROCEDURE — 1160F PR REVIEW ALL MEDS BY PRESCRIBER/CLIN PHARMACIST DOCUMENTED: ICD-10-PCS | Mod: CPTII,S$GLB,, | Performed by: NURSE PRACTITIONER

## 2022-11-07 PROCEDURE — 1126F PR PAIN SEVERITY QUANTIFIED, NO PAIN PRESENT: ICD-10-PCS | Mod: CPTII,S$GLB,, | Performed by: NURSE PRACTITIONER

## 2022-11-07 PROCEDURE — G0008 ADMIN INFLUENZA VIRUS VAC: HCPCS | Mod: S$GLB,,, | Performed by: NURSE PRACTITIONER

## 2022-11-07 PROCEDURE — 3288F FALL RISK ASSESSMENT DOCD: CPT | Mod: CPTII,S$GLB,, | Performed by: NURSE PRACTITIONER

## 2022-11-07 PROCEDURE — 3074F SYST BP LT 130 MM HG: CPT | Mod: CPTII,S$GLB,, | Performed by: NURSE PRACTITIONER

## 2022-11-07 PROCEDURE — 99499 UNLISTED E&M SERVICE: CPT | Mod: HCNC,S$GLB,, | Performed by: NURSE PRACTITIONER

## 2022-11-07 PROCEDURE — 1159F MED LIST DOCD IN RCRD: CPT | Mod: CPTII,S$GLB,, | Performed by: NURSE PRACTITIONER

## 2022-11-07 PROCEDURE — 99499 RISK ADDL DX/OHS AUDIT: ICD-10-PCS | Mod: HCNC,S$GLB,, | Performed by: NURSE PRACTITIONER

## 2022-11-07 PROCEDURE — 1101F PR PT FALLS ASSESS DOC 0-1 FALLS W/OUT INJ PAST YR: ICD-10-PCS | Mod: CPTII,S$GLB,, | Performed by: NURSE PRACTITIONER

## 2022-11-07 PROCEDURE — 3074F PR MOST RECENT SYSTOLIC BLOOD PRESSURE < 130 MM HG: ICD-10-PCS | Mod: CPTII,S$GLB,, | Performed by: NURSE PRACTITIONER

## 2022-11-07 PROCEDURE — 1126F AMNT PAIN NOTED NONE PRSNT: CPT | Mod: CPTII,S$GLB,, | Performed by: NURSE PRACTITIONER

## 2022-11-07 PROCEDURE — 1160F RVW MEDS BY RX/DR IN RCRD: CPT | Mod: CPTII,S$GLB,, | Performed by: NURSE PRACTITIONER

## 2022-11-07 NOTE — PROGRESS NOTES
"  Alfonso Ewing presented for a  Medicare AWV and comprehensive Health Risk Assessment today. The following components were reviewed and updated:    Medical history  Family History  Social history  Allergies and Current Medications  Health Risk Assessment  Health Maintenance  Care Team         ** See Completed Assessments for Annual Wellness Visit within the encounter summary.**         The following assessments were completed:  Living Situation  CAGE  Depression Screening  Timed Get Up and Go  Whisper Test  Cognitive Function Screening      Nutrition Screening  ADL Screening  PAQ Screening    Review for Opioid Screening: Patient does not have rx for Opioids.    Review for Substance Use Disorders: Patient does not use substance.      Vitals:    11/07/22 1311   BP: 128/64   BP Location: Left arm   Patient Position: Sitting   BP Method: Medium (Manual)   Pulse: (!) 45   SpO2: 96%   Weight: 93.6 kg (206 lb 5.6 oz)   Height: 5' 10" (1.778 m)     Body mass index is 29.61 kg/m².  Physical Exam  Vitals reviewed.   HENT:      Head: Normocephalic.   Cardiovascular:      Rate and Rhythm: Bradycardia present.   Pulmonary:      Effort: Pulmonary effort is normal.   Skin:     General: Skin is warm.   Neurological:      Mental Status: He is alert.   Psychiatric:         Mood and Affect: Mood normal.             Diagnoses and health risks identified today and associated recommendations/orders:    1. Encounter for preventive health examination  Reviewed health maintenance and provided recommendations   Flu vaccine today  Recommend COVID booster,  lipid panel is scheduled  All additional health maintenance is UTD    2. Prostate cancer  Continue to monitor  Followed by Dr Mckeon.      3. Hyperparathyroidism  Continue to monitor  Followed by Dr Mcgowan.      4. Stage 3 chronic kidney disease, unspecified whether stage 3a or 3b CKD  Continue to monitor  Followed by Dr Mcgowan  Avoid nsaids.      5. Aortic atherosclerosis  Continue to " monitor  Followed by Dr Borrero   No CP.    CXR 7/27/20    6. Stenosis of left carotid artery  Continue to monitor  Followed by Dr Borrero  Carotid US 7/25/19.      7. Coronary artery disease involving native coronary artery of native heart without angina pectoris  Continue to monitor  Followed by Dr Borrero.      8. Essential hypertension  Continue to monitor  Followed by Adrian Raza MD .      9. Dyslipidemia  Continue to monitor  Followed by Adrian Raza MD   Taking statin.        Provided Alfonso with a 5-10 year written screening schedule and personal prevention plan. Recommendations were developed using the USPSTF age appropriate recommendations. Education, counseling, and referrals were provided as needed. After Visit Summary printed and given to patient which includes a list of additional screenings\tests needed.    Follow up in one year    Kiara Joseph NP      I offered to discuss advanced care planning, including how to pick a person who would make decisions for you if you were unable to make them for yourself, called a health care power of , and what kind of decisions you might make such as use of life sustaining treatments such as ventilators and tube feeding when faced with a life limiting illness recorded on a living will that they will need to know. (How you want to be cared for as you near the end of your natural life)     X Patient is interested in learning more about how to make advanced directives.  I provided them paperwork and offered to discuss this with them.

## 2022-11-07 NOTE — PATIENT INSTRUCTIONS
Counseling and Referral of Other Preventative  (Italic type indicates deductible and co-insurance are waived)    Patient Name: Alfonso Ewing  Today's Date: 11/7/2022    Health Maintenance       Date Due Completion Date    COVID-19 Vaccine (3 - Booster for Pfizer series) 05/13/2022 3/18/2022    High Dose Statin 11/07/2023 11/7/2022    Colorectal Cancer Screening 01/31/2024 1/31/2019    Lipid Panel 02/10/2027 2/10/2022    TETANUS VACCINE 07/13/2028 7/13/2018        Orders Placed This Encounter   Procedures    Influenza - Quadrivalent (Adjuvanted)       The following information is provided to all patients.  This information is to help you find resources for any of the problems found today that may be affecting your health:                Living healthy guide: www.[facility].[place].gov      Understanding Diabetes: www.diabetes.org      Eating healthy: www.cdc.gov/healthyweight      [facility] home safety checklist: www.cdc.gov/steadi/patient.html      Agency on Aging: www.goea.[place].[facility]      Alcoholics anonymous (AA): www.aa.org      Physical Activity: www.kathya.nih.gov/ic4oxdg      Tobacco use: www.quitwithusla.org

## 2022-12-01 NOTE — [employer] PATIENT INSTRUCTIONS
Anesthesia Evaluation     Patient summary reviewed and Nursing notes reviewed   no history of anesthetic complications:  NPO Solid Status: > 8 hours  NPO Liquid Status: > 2 hours           Airway   Mallampati: II  TM distance: >3 FB  Neck ROM: full  No difficulty expected and Anterior  Dental      Pulmonary - negative pulmonary ROS and normal exam    breath sounds clear to auscultation  Cardiovascular - normal exam  Exercise tolerance: good (4-7 METS)    Rhythm: regular  Rate: normal    (+) hyperlipidemia,       Neuro/Psych  (+) headaches,    GI/Hepatic/Renal/Endo    (+)   renal disease, thyroid problem hypothyroidism    Musculoskeletal (-) negative ROS    Abdominal    Substance History - negative use     OB/GYN negative ob/gyn ROS         Other - negative ROS       ROS/Med Hx Other: PAT Nursing Notes unavailable.                   Anesthesia Plan    ASA 2     general     (Total IV Anesthesia    Patient understands anesthesia not responsible for dental damage.  )  intravenous induction     Anesthetic plan, risks, benefits, and alternatives have been provided, discussed and informed consent has been obtained with: patient.  Pre-procedure education provided  Plan discussed with CRNA.        CODE STATUS:        Discharge Summary/Instructions after an Endoscopic Procedure  Patient Name: Alfonso Ewing  Patient MRN: [id number]  Patient YOB: 1950 Thursday, January 31, 2019  Evan Basurto MD  RESTRICTIONS:  During your procedure today, you received medications for sedation.  These   medications may affect your judgment, balance and coordination.  Therefore,   for 24 hours, you have the following restrictions:   - DO NOT drive a car, operate machinery, make legal/financial decisions,   sign important papers or drink alcohol.    ACTIVITY:  Today: no heavy lifting, straining or running due to procedural   sedation/anesthesia.  The following day: return to full activity including work.  DIET:  Eat and drink normally unless instructed otherwise.     TREATMENT FOR COMMON SIDE EFFECTS:  - Mild abdominal pain, nausea, belching, bloating or excessive gas:  rest,   eat lightly and use a heating pad.  - Sore Throat: treat with throat lozenges and/or gargle with warm salt   water.  - Because air was used during the procedure, expelling large amounts of air   from your rectum or belching is normal.  - If a bowel prep was taken, you may not have a bowel movement for 1-3 days.    This is normal.  SYMPTOMS TO WATCH FOR AND REPORT TO YOUR PHYSICIAN:  1. Abdominal pain or bloating, other than gas cramps.  2. Chest pain.  3. Back pain.  4. Signs of infection such as: chills or fever occurring within 24 hours   after the procedure.  5. Rectal bleeding, which would show as bright red, maroon, or black stools.   (A tablespoon of blood from the rectum is not serious, especially if   hemorrhoids are present.)  6. Vomiting.  7. Weakness or dizziness.  GO DIRECTLY TO THE NEAREST EMERGENCY ROOM IF YOU HAVE ANY OF THE FOLLOWING:      Difficulty breathing              Chills and/or fever over 101 F   Persistent vomiting and/or vomiting blood   Severe abdominal pain   Severe chest pain   Black, tarry stools   Bleeding- more than one  tablespoon   Any other symptom or condition that you feel may need urgent attention  Your doctor recommends these additional instructions:  If any biopsies were taken, your doctors clinic will contact you in 1 to 2   weeks with any results.  We are waiting for your pathology results.   Your physician has recommended a repeat colonoscopy in five years for   surveillance based on pathology results.   You are being discharged to home.  For questions, problems or results please call your physician - Evan Basurto MD at Work: [phone].  EMERGENCY PHONE NUMBER: [phone], LAB RESULTS: [phone]  IF A COMPLICATION OR EMERGENCY SITUATION ARISES AND YOU ARE UNABLE TO REACH   YOUR PHYSICIAN - GO DIRECTLY TO THE EMERGENCY ROOM.  ___________________________________________  Nurse Signature  ___________________________________________  Patient/Designated Responsible Party Signature  Evan Basurto MD  1/31/2019 11:19:54 AM  This report has been verified and signed electronically.  [employer]

## 2023-02-02 ENCOUNTER — LAB VISIT (OUTPATIENT)
Dept: LAB | Facility: HOSPITAL | Age: 73
End: 2023-02-02
Attending: INTERNAL MEDICINE
Payer: MEDICARE

## 2023-02-02 DIAGNOSIS — I25.10 CORONARY ARTERY DISEASE INVOLVING NATIVE CORONARY ARTERY OF NATIVE HEART WITHOUT ANGINA PECTORIS: Chronic | ICD-10-CM

## 2023-02-02 DIAGNOSIS — Z85.46 HISTORY OF PROSTATE CANCER: ICD-10-CM

## 2023-02-02 DIAGNOSIS — I10 ESSENTIAL HYPERTENSION: Chronic | ICD-10-CM

## 2023-02-02 DIAGNOSIS — E78.5 DYSLIPIDEMIA: ICD-10-CM

## 2023-02-02 DIAGNOSIS — R94.39 ABNORMAL THALLIUM STRESS TEST: ICD-10-CM

## 2023-02-02 DIAGNOSIS — E21.3 HYPERPARATHYROIDISM: ICD-10-CM

## 2023-02-02 DIAGNOSIS — Z95.5 STATUS POST CORONARY ARTERY STENT PLACEMENT: Chronic | ICD-10-CM

## 2023-02-02 DIAGNOSIS — N18.30 STAGE 3 CHRONIC KIDNEY DISEASE, UNSPECIFIED WHETHER STAGE 3A OR 3B CKD: ICD-10-CM

## 2023-02-02 DIAGNOSIS — Z12.5 ENCOUNTER FOR SCREENING FOR MALIGNANT NEOPLASM OF PROSTATE: ICD-10-CM

## 2023-02-02 LAB
ALBUMIN SERPL BCP-MCNC: 4.1 G/DL (ref 3.5–5.2)
ALP SERPL-CCNC: 45 U/L (ref 55–135)
ALT SERPL W/O P-5'-P-CCNC: 80 U/L (ref 10–44)
ANION GAP SERPL CALC-SCNC: 9 MMOL/L (ref 8–16)
AST SERPL-CCNC: 52 U/L (ref 10–40)
BASOPHILS # BLD AUTO: 0.03 K/UL (ref 0–0.2)
BASOPHILS NFR BLD: 0.5 % (ref 0–1.9)
BILIRUB SERPL-MCNC: 0.6 MG/DL (ref 0.1–1)
BUN SERPL-MCNC: 19 MG/DL (ref 8–23)
CALCIUM SERPL-MCNC: 9.9 MG/DL (ref 8.7–10.5)
CHLORIDE SERPL-SCNC: 107 MMOL/L (ref 95–110)
CHOLEST SERPL-MCNC: 122 MG/DL (ref 120–199)
CHOLEST/HDLC SERPL: 3.6 {RATIO} (ref 2–5)
CO2 SERPL-SCNC: 28 MMOL/L (ref 23–29)
COMPLEXED PSA SERPL-MCNC: <0.01 NG/ML (ref 0–4)
CREAT SERPL-MCNC: 1.3 MG/DL (ref 0.5–1.4)
DIFFERENTIAL METHOD: ABNORMAL
EOSINOPHIL # BLD AUTO: 0.1 K/UL (ref 0–0.5)
EOSINOPHIL NFR BLD: 1.6 % (ref 0–8)
ERYTHROCYTE [DISTWIDTH] IN BLOOD BY AUTOMATED COUNT: 12.7 % (ref 11.5–14.5)
EST. GFR  (NO RACE VARIABLE): 57.6 ML/MIN/1.73 M^2
GLUCOSE SERPL-MCNC: 111 MG/DL (ref 70–110)
HCT VFR BLD AUTO: 43.6 % (ref 40–54)
HDLC SERPL-MCNC: 34 MG/DL (ref 40–75)
HDLC SERPL: 27.9 % (ref 20–50)
HGB BLD-MCNC: 14 G/DL (ref 14–18)
IMM GRANULOCYTES # BLD AUTO: 0.01 K/UL (ref 0–0.04)
IMM GRANULOCYTES NFR BLD AUTO: 0.2 % (ref 0–0.5)
LDLC SERPL CALC-MCNC: 66.8 MG/DL (ref 63–159)
LYMPHOCYTES # BLD AUTO: 1.2 K/UL (ref 1–4.8)
LYMPHOCYTES NFR BLD: 22 % (ref 18–48)
MCH RBC QN AUTO: 30.8 PG (ref 27–31)
MCHC RBC AUTO-ENTMCNC: 32.1 G/DL (ref 32–36)
MCV RBC AUTO: 96 FL (ref 82–98)
MONOCYTES # BLD AUTO: 0.4 K/UL (ref 0.3–1)
MONOCYTES NFR BLD: 7.6 % (ref 4–15)
NEUTROPHILS # BLD AUTO: 3.8 K/UL (ref 1.8–7.7)
NEUTROPHILS NFR BLD: 68.1 % (ref 38–73)
NONHDLC SERPL-MCNC: 88 MG/DL
NRBC BLD-RTO: 0 /100 WBC
PLATELET # BLD AUTO: 183 K/UL (ref 150–450)
PMV BLD AUTO: 13 FL (ref 9.2–12.9)
POTASSIUM SERPL-SCNC: 4.6 MMOL/L (ref 3.5–5.1)
PROT SERPL-MCNC: 7.1 G/DL (ref 6–8.4)
RBC # BLD AUTO: 4.54 M/UL (ref 4.6–6.2)
SODIUM SERPL-SCNC: 144 MMOL/L (ref 136–145)
T4 FREE SERPL-MCNC: 0.93 NG/DL (ref 0.71–1.51)
TRIGL SERPL-MCNC: 106 MG/DL (ref 30–150)
TSH SERPL DL<=0.005 MIU/L-ACNC: 2.56 UIU/ML (ref 0.4–4)
WBC # BLD AUTO: 5.63 K/UL (ref 3.9–12.7)

## 2023-02-02 PROCEDURE — 36415 COLL VENOUS BLD VENIPUNCTURE: CPT | Mod: HCNC,PO | Performed by: INTERNAL MEDICINE

## 2023-02-02 PROCEDURE — 80061 LIPID PANEL: CPT | Mod: HCNC | Performed by: INTERNAL MEDICINE

## 2023-02-02 PROCEDURE — 85025 COMPLETE CBC W/AUTO DIFF WBC: CPT | Mod: HCNC | Performed by: INTERNAL MEDICINE

## 2023-02-02 PROCEDURE — 84439 ASSAY OF FREE THYROXINE: CPT | Mod: HCNC | Performed by: INTERNAL MEDICINE

## 2023-02-02 PROCEDURE — 84153 ASSAY OF PSA TOTAL: CPT | Mod: HCNC | Performed by: INTERNAL MEDICINE

## 2023-02-02 PROCEDURE — 84443 ASSAY THYROID STIM HORMONE: CPT | Mod: HCNC | Performed by: INTERNAL MEDICINE

## 2023-02-02 PROCEDURE — 80053 COMPREHEN METABOLIC PANEL: CPT | Mod: HCNC | Performed by: INTERNAL MEDICINE

## 2023-03-02 ENCOUNTER — OFFICE VISIT (OUTPATIENT)
Dept: FAMILY MEDICINE | Facility: CLINIC | Age: 73
End: 2023-03-02
Payer: MEDICARE

## 2023-03-02 VITALS
WEIGHT: 201.5 LBS | SYSTOLIC BLOOD PRESSURE: 123 MMHG | HEIGHT: 70 IN | DIASTOLIC BLOOD PRESSURE: 84 MMHG | BODY MASS INDEX: 28.85 KG/M2 | TEMPERATURE: 98 F | HEART RATE: 42 BPM | OXYGEN SATURATION: 97 %

## 2023-03-02 DIAGNOSIS — I10 ESSENTIAL HYPERTENSION: ICD-10-CM

## 2023-03-02 DIAGNOSIS — E78.5 DYSLIPIDEMIA: ICD-10-CM

## 2023-03-02 DIAGNOSIS — Z00.00 ROUTINE PHYSICAL EXAMINATION: Primary | ICD-10-CM

## 2023-03-02 DIAGNOSIS — Z85.46 HISTORY OF PROSTATE CANCER: ICD-10-CM

## 2023-03-02 PROBLEM — E21.3 HYPERPARATHYROIDISM: Status: RESOLVED | Noted: 2022-08-24 | Resolved: 2023-03-02

## 2023-03-02 PROBLEM — R97.20 ELEVATED PSA: Status: RESOLVED | Noted: 2020-02-10 | Resolved: 2023-03-02

## 2023-03-02 PROBLEM — C61 PROSTATE CANCER: Status: RESOLVED | Noted: 2020-05-27 | Resolved: 2023-03-02

## 2023-03-02 PROCEDURE — 1101F PT FALLS ASSESS-DOCD LE1/YR: CPT | Mod: HCNC,CPTII,S$GLB, | Performed by: INTERNAL MEDICINE

## 2023-03-02 PROCEDURE — 1159F PR MEDICATION LIST DOCUMENTED IN MEDICAL RECORD: ICD-10-PCS | Mod: HCNC,CPTII,S$GLB, | Performed by: INTERNAL MEDICINE

## 2023-03-02 PROCEDURE — 3008F BODY MASS INDEX DOCD: CPT | Mod: HCNC,CPTII,S$GLB, | Performed by: INTERNAL MEDICINE

## 2023-03-02 PROCEDURE — 99999 PR PBB SHADOW E&M-EST. PATIENT-LVL III: CPT | Mod: PBBFAC,HCNC,, | Performed by: INTERNAL MEDICINE

## 2023-03-02 PROCEDURE — 1159F MED LIST DOCD IN RCRD: CPT | Mod: HCNC,CPTII,S$GLB, | Performed by: INTERNAL MEDICINE

## 2023-03-02 PROCEDURE — 3288F FALL RISK ASSESSMENT DOCD: CPT | Mod: HCNC,CPTII,S$GLB, | Performed by: INTERNAL MEDICINE

## 2023-03-02 PROCEDURE — 3288F PR FALLS RISK ASSESSMENT DOCUMENTED: ICD-10-PCS | Mod: HCNC,CPTII,S$GLB, | Performed by: INTERNAL MEDICINE

## 2023-03-02 PROCEDURE — 1101F PR PT FALLS ASSESS DOC 0-1 FALLS W/OUT INJ PAST YR: ICD-10-PCS | Mod: HCNC,CPTII,S$GLB, | Performed by: INTERNAL MEDICINE

## 2023-03-02 PROCEDURE — 3008F PR BODY MASS INDEX (BMI) DOCUMENTED: ICD-10-PCS | Mod: HCNC,CPTII,S$GLB, | Performed by: INTERNAL MEDICINE

## 2023-03-02 PROCEDURE — 1126F PR PAIN SEVERITY QUANTIFIED, NO PAIN PRESENT: ICD-10-PCS | Mod: HCNC,CPTII,S$GLB, | Performed by: INTERNAL MEDICINE

## 2023-03-02 PROCEDURE — 99999 PR PBB SHADOW E&M-EST. PATIENT-LVL III: ICD-10-PCS | Mod: PBBFAC,HCNC,, | Performed by: INTERNAL MEDICINE

## 2023-03-02 PROCEDURE — 3079F PR MOST RECENT DIASTOLIC BLOOD PRESSURE 80-89 MM HG: ICD-10-PCS | Mod: HCNC,CPTII,S$GLB, | Performed by: INTERNAL MEDICINE

## 2023-03-02 PROCEDURE — 3079F DIAST BP 80-89 MM HG: CPT | Mod: HCNC,CPTII,S$GLB, | Performed by: INTERNAL MEDICINE

## 2023-03-02 PROCEDURE — 3074F SYST BP LT 130 MM HG: CPT | Mod: HCNC,CPTII,S$GLB, | Performed by: INTERNAL MEDICINE

## 2023-03-02 PROCEDURE — 99397 PER PM REEVAL EST PAT 65+ YR: CPT | Mod: HCNC,S$GLB,, | Performed by: INTERNAL MEDICINE

## 2023-03-02 PROCEDURE — 3074F PR MOST RECENT SYSTOLIC BLOOD PRESSURE < 130 MM HG: ICD-10-PCS | Mod: HCNC,CPTII,S$GLB, | Performed by: INTERNAL MEDICINE

## 2023-03-02 PROCEDURE — 1126F AMNT PAIN NOTED NONE PRSNT: CPT | Mod: HCNC,CPTII,S$GLB, | Performed by: INTERNAL MEDICINE

## 2023-03-02 PROCEDURE — 99397 PR PREVENTIVE VISIT,EST,65 & OVER: ICD-10-PCS | Mod: HCNC,S$GLB,, | Performed by: INTERNAL MEDICINE

## 2023-03-02 NOTE — PROGRESS NOTES
Subjective:       Patient ID: Alfonso Ewing is a 72 y.o. male.  Chief Complaint: Hypertension     HPI    Here for routine health maintenance.      Gout - rare attacks.  Uric acid mildly high.  Controlled with prn colchicine      CAD - failed repeat stress test, which led to 2 more stents 5/2022.  Hx MI s/p stents x2 prior.  Dr Borrero   Bradycardia - being evaluated by cardiology.  No LOC, lightheadedness, weakness     HTN - controlled.      HLD - controlled goal < 70 LDL on Zetia (CAN) and 40 mg pravastatin.  Previous high dose statin caused muscle pain.   Transaminitis - mild; card dec pravastatin back down to 20 mg and wants recheck in 4 mo     Prostate cancer s/p surgical removal 2020.  PSA undetectable          Assessment:       1. Routine physical examination    2. Essential hypertension    3. Dyslipidemia    4. History of prostate cancer          Plan:       Routine physical examination    Essential hypertension    Dyslipidemia    History of prostate cancer          Wellness reviewed   Continue current management and monitor.  Other diagnoses were reviewed and found stable and will continue to monitor.  Counseled on regular exercise, maintenance of a healthy weight, balanced diet rich in fruits/vegetables and lean protein, and avoidance of unhealthy habits like smoking and excessive alcohol intake.   Also, counseled on importance of being compliant with medication, health appointments, diet and exercise.     Follow up in about 4 months (around 7/2/2023).  LFT/FLP then q1y      Medication List with Changes/Refills   Current Medications    ACETAMINOPHEN (TYLENOL) 325 MG TABLET    Take 1 tablet (325 mg total) by mouth every 6 (six) hours as needed for Pain.    AMLODIPINE (NORVASC) 5 MG TABLET    Take 1 tablet (5 mg total) by mouth every evening.    ASPIRIN (ECOTRIN) 81 MG EC TABLET    Take 81 mg by mouth every evening. HOLDING UNTIL INST BY MD    CLOPIDOGREL (PLAVIX) 75 MG TABLET    TAKE 1 TABLET(75 MG) BY MOUTH  EVERY DAY    COLCHICINE (COLCRYS) 0.6 MG TABLET    TAKE 1 TABLET NOW AND 1 AT BEDTIME, THEN 1 DAILY    EZETIMIBE (ZETIA) 10 MG TABLET    Take 1 tablet (10 mg total) by mouth once daily.    NITROGLYCERIN (NITROSTAT) 0.4 MG SL TABLET    Place 1 tablet (0.4 mg total) under the tongue every 5 (five) minutes as needed for Chest pain.    PRAVASTATIN (PRAVACHOL) 20 MG TABLET    Take 1 tablet (20 mg total) by mouth every evening.    SILDENAFIL (VIAGRA) 100 MG TABLET    Take 1 tablet (100 mg total) by mouth daily as needed for Erectile Dysfunction.    VALSARTAN-HYDROCHLOROTHIAZIDE (DIOVAN-HCT) 320-12.5 MG PER TABLET    TAKE 1 TABLET BY MOUTH EVERY DAY       BP Readings from Last 3 Encounters:   03/02/23 123/84   02/17/23 120/82   11/07/22 128/64     Hemoglobin A1C   Date Value Ref Range Status   10/19/2011 5.5 4.0 - 6.2 % Final     Lab Results   Component Value Date    TSH 2.556 02/02/2023     Lab Results   Component Value Date    LDLCALC 66.8 02/02/2023    LDLCALC 76.2 02/10/2022    LDLCALC 79.8 01/06/2021     Lab Results   Component Value Date    TRIG 106 02/02/2023    TRIG 244 (H) 02/10/2022    TRIG 171 (H) 01/06/2021     Wt Readings from Last 3 Encounters:   03/02/23 91.4 kg (201 lb 8 oz)   02/17/23 91.9 kg (202 lb 11.2 oz)   11/07/22 93.6 kg (206 lb 5.6 oz)     Lab Results   Component Value Date    HGB 14.0 02/02/2023    HCT 43.6 02/02/2023    WBC 5.63 02/02/2023    ALT 80 (H) 02/02/2023    AST 52 (H) 02/02/2023     02/02/2023    K 4.6 02/02/2023    CREATININE 1.3 02/02/2023    PSA 7.0 (H) 12/10/2019           Review of Systems   Constitutional:  Negative for diaphoresis and fever.   HENT:  Negative for drooling and nosebleeds.    Eyes:  Negative for discharge and redness.   Respiratory:  Negative for apnea and choking.    Cardiovascular:  Negative for chest pain and palpitations.   Gastrointestinal:  Negative for abdominal pain and nausea.   Skin:  Negative for color change.   Neurological:  Negative for seizures  and syncope.   Psychiatric/Behavioral:  Negative for behavioral problems.          Objective:      Vitals:    03/02/23 1109   BP: 123/84   Pulse: (!) 42   Temp: 97.5 °F (36.4 °C)     Physical Exam  Vitals reviewed.   Constitutional:       Appearance: Normal appearance.   Eyes:      Conjunctiva/sclera: Conjunctivae normal.   Cardiovascular:      Rate and Rhythm: Normal rate.   Pulmonary:      Effort: Pulmonary effort is normal.      Breath sounds: Normal breath sounds.   Musculoskeletal:      Cervical back: Normal range of motion.      Comments: Normal ROM bilateral    Skin:     General: Skin is warm and dry.   Neurological:      Mental Status: He is alert.      Cranial Nerves: Cranial nerve deficit: grossly intact.   Psychiatric:      Comments: Alert and orientated

## 2023-06-26 ENCOUNTER — LAB VISIT (OUTPATIENT)
Dept: LAB | Facility: HOSPITAL | Age: 73
End: 2023-06-26
Attending: INTERNAL MEDICINE
Payer: MEDICARE

## 2023-06-26 DIAGNOSIS — N18.30 STAGE 3 CHRONIC KIDNEY DISEASE, UNSPECIFIED WHETHER STAGE 3A OR 3B CKD: ICD-10-CM

## 2023-06-26 DIAGNOSIS — R94.39 ABNORMAL THALLIUM STRESS TEST: ICD-10-CM

## 2023-06-26 DIAGNOSIS — I25.10 CORONARY ARTERY DISEASE INVOLVING NATIVE CORONARY ARTERY OF NATIVE HEART WITHOUT ANGINA PECTORIS: Chronic | ICD-10-CM

## 2023-06-26 DIAGNOSIS — I10 ESSENTIAL HYPERTENSION: Chronic | ICD-10-CM

## 2023-06-26 DIAGNOSIS — E78.5 DYSLIPIDEMIA: ICD-10-CM

## 2023-06-26 DIAGNOSIS — I70.0 AORTIC ATHEROSCLEROSIS: ICD-10-CM

## 2023-06-26 DIAGNOSIS — Z95.5 STATUS POST CORONARY ARTERY STENT PLACEMENT: Chronic | ICD-10-CM

## 2023-06-26 DIAGNOSIS — R00.1 BRADYCARDIA: Chronic | ICD-10-CM

## 2023-06-26 DIAGNOSIS — I65.22 STENOSIS OF LEFT CAROTID ARTERY: ICD-10-CM

## 2023-06-26 LAB
ALBUMIN SERPL BCP-MCNC: 3.7 G/DL (ref 3.5–5.2)
ALP SERPL-CCNC: 39 U/L (ref 55–135)
ALT SERPL W/O P-5'-P-CCNC: 34 U/L (ref 10–44)
ANION GAP SERPL CALC-SCNC: 7 MMOL/L (ref 8–16)
AST SERPL-CCNC: 31 U/L (ref 10–40)
BILIRUB SERPL-MCNC: 0.6 MG/DL (ref 0.1–1)
BUN SERPL-MCNC: 26 MG/DL (ref 8–23)
CALCIUM SERPL-MCNC: 9.5 MG/DL (ref 8.7–10.5)
CHLORIDE SERPL-SCNC: 106 MMOL/L (ref 95–110)
CHOLEST SERPL-MCNC: 117 MG/DL (ref 120–199)
CHOLEST/HDLC SERPL: 3.7 {RATIO} (ref 2–5)
CK SERPL-CCNC: 121 U/L (ref 20–200)
CO2 SERPL-SCNC: 26 MMOL/L (ref 23–29)
CREAT SERPL-MCNC: 1.6 MG/DL (ref 0.5–1.4)
EST. GFR  (NO RACE VARIABLE): 44.7 ML/MIN/1.73 M^2
GLUCOSE SERPL-MCNC: 108 MG/DL (ref 70–110)
HDLC SERPL-MCNC: 32 MG/DL (ref 40–75)
HDLC SERPL: 27.4 % (ref 20–50)
LDLC SERPL CALC-MCNC: 62.2 MG/DL (ref 63–159)
NONHDLC SERPL-MCNC: 85 MG/DL
POTASSIUM SERPL-SCNC: 4.9 MMOL/L (ref 3.5–5.1)
PROT SERPL-MCNC: 6.7 G/DL (ref 6–8.4)
SODIUM SERPL-SCNC: 139 MMOL/L (ref 136–145)
TRIGL SERPL-MCNC: 114 MG/DL (ref 30–150)

## 2023-06-26 PROCEDURE — 80053 COMPREHEN METABOLIC PANEL: CPT | Performed by: INTERNAL MEDICINE

## 2023-06-26 PROCEDURE — 80061 LIPID PANEL: CPT | Performed by: INTERNAL MEDICINE

## 2023-06-26 PROCEDURE — 36415 COLL VENOUS BLD VENIPUNCTURE: CPT | Mod: PO | Performed by: INTERNAL MEDICINE

## 2023-06-26 PROCEDURE — 82550 ASSAY OF CK (CPK): CPT | Performed by: INTERNAL MEDICINE

## 2023-07-05 ENCOUNTER — OFFICE VISIT (OUTPATIENT)
Dept: FAMILY MEDICINE | Facility: CLINIC | Age: 73
End: 2023-07-05
Payer: MEDICARE

## 2023-07-05 VITALS
RESPIRATION RATE: 18 BRPM | SYSTOLIC BLOOD PRESSURE: 120 MMHG | DIASTOLIC BLOOD PRESSURE: 80 MMHG | OXYGEN SATURATION: 97 % | BODY MASS INDEX: 28.78 KG/M2 | HEIGHT: 70 IN | HEART RATE: 73 BPM | TEMPERATURE: 98 F | WEIGHT: 201.06 LBS

## 2023-07-05 DIAGNOSIS — R79.89 PRERENAL AZOTEMIA: ICD-10-CM

## 2023-07-05 DIAGNOSIS — I10 ESSENTIAL HYPERTENSION: Primary | ICD-10-CM

## 2023-07-05 DIAGNOSIS — Z12.5 ENCOUNTER FOR SCREENING FOR MALIGNANT NEOPLASM OF PROSTATE: ICD-10-CM

## 2023-07-05 DIAGNOSIS — I25.10 CORONARY ARTERY DISEASE INVOLVING NATIVE CORONARY ARTERY OF NATIVE HEART WITHOUT ANGINA PECTORIS: Chronic | ICD-10-CM

## 2023-07-05 DIAGNOSIS — Z79.899 ENCOUNTER FOR LONG-TERM (CURRENT) USE OF MEDICATIONS: ICD-10-CM

## 2023-07-05 DIAGNOSIS — E78.5 DYSLIPIDEMIA: ICD-10-CM

## 2023-07-05 DIAGNOSIS — M1A.00X0 IDIOPATHIC CHRONIC GOUT WITHOUT TOPHUS, UNSPECIFIED SITE: ICD-10-CM

## 2023-07-05 DIAGNOSIS — Z85.46 HISTORY OF PROSTATE CANCER: ICD-10-CM

## 2023-07-05 PROCEDURE — 3288F PR FALLS RISK ASSESSMENT DOCUMENTED: ICD-10-PCS | Mod: CPTII,S$GLB,, | Performed by: INTERNAL MEDICINE

## 2023-07-05 PROCEDURE — 1101F PT FALLS ASSESS-DOCD LE1/YR: CPT | Mod: CPTII,S$GLB,, | Performed by: INTERNAL MEDICINE

## 2023-07-05 PROCEDURE — 3079F DIAST BP 80-89 MM HG: CPT | Mod: CPTII,S$GLB,, | Performed by: INTERNAL MEDICINE

## 2023-07-05 PROCEDURE — 99999 PR PBB SHADOW E&M-EST. PATIENT-LVL III: ICD-10-PCS | Mod: PBBFAC,,, | Performed by: INTERNAL MEDICINE

## 2023-07-05 PROCEDURE — 1159F MED LIST DOCD IN RCRD: CPT | Mod: CPTII,S$GLB,, | Performed by: INTERNAL MEDICINE

## 2023-07-05 PROCEDURE — 99214 PR OFFICE/OUTPT VISIT, EST, LEVL IV, 30-39 MIN: ICD-10-PCS | Mod: S$GLB,,, | Performed by: INTERNAL MEDICINE

## 2023-07-05 PROCEDURE — 1101F PR PT FALLS ASSESS DOC 0-1 FALLS W/OUT INJ PAST YR: ICD-10-PCS | Mod: CPTII,S$GLB,, | Performed by: INTERNAL MEDICINE

## 2023-07-05 PROCEDURE — 3074F PR MOST RECENT SYSTOLIC BLOOD PRESSURE < 130 MM HG: ICD-10-PCS | Mod: CPTII,S$GLB,, | Performed by: INTERNAL MEDICINE

## 2023-07-05 PROCEDURE — 1126F AMNT PAIN NOTED NONE PRSNT: CPT | Mod: CPTII,S$GLB,, | Performed by: INTERNAL MEDICINE

## 2023-07-05 PROCEDURE — 3288F FALL RISK ASSESSMENT DOCD: CPT | Mod: CPTII,S$GLB,, | Performed by: INTERNAL MEDICINE

## 2023-07-05 PROCEDURE — 1126F PR PAIN SEVERITY QUANTIFIED, NO PAIN PRESENT: ICD-10-PCS | Mod: CPTII,S$GLB,, | Performed by: INTERNAL MEDICINE

## 2023-07-05 PROCEDURE — 3079F PR MOST RECENT DIASTOLIC BLOOD PRESSURE 80-89 MM HG: ICD-10-PCS | Mod: CPTII,S$GLB,, | Performed by: INTERNAL MEDICINE

## 2023-07-05 PROCEDURE — 1159F PR MEDICATION LIST DOCUMENTED IN MEDICAL RECORD: ICD-10-PCS | Mod: CPTII,S$GLB,, | Performed by: INTERNAL MEDICINE

## 2023-07-05 PROCEDURE — 3074F SYST BP LT 130 MM HG: CPT | Mod: CPTII,S$GLB,, | Performed by: INTERNAL MEDICINE

## 2023-07-05 PROCEDURE — 99999 PR PBB SHADOW E&M-EST. PATIENT-LVL III: CPT | Mod: PBBFAC,,, | Performed by: INTERNAL MEDICINE

## 2023-07-05 PROCEDURE — 99214 OFFICE O/P EST MOD 30 MIN: CPT | Mod: S$GLB,,, | Performed by: INTERNAL MEDICINE

## 2023-07-05 NOTE — PROGRESS NOTES
Subjective:       Patient ID: Alfonso Ewing is a 73 y.o. male.  Chief Complaint: Hypertension     HPI    Gout - rare attacks.  Uric acid mildly high.  Controlled with prn colchicine      CAD - failed repeat stress test, which led to 2 more stents 5/2022.  Hx MI s/p stents x2 prior.  Dr Borrero   Bradycardia - being evaluated by cardiology.  No LOC, lightheadedness, weakness     HTN - controlled.      HLD - controlled goal < 70 LDL on Zetia (CAN) and 40 mg pravastatin.  Previous high dose statin caused muscle pain.   Transaminitis - corrected; card dec pravastatin back down to 20 mg and wants recheck in 4 mo    MAHESH/pre-renal azotemia - likely from dehydration 2nd to fasting labs.  Does not take NSAIDS     Prostate cancer s/p surgical removal 2020.  PSA undetectable            Assessment:       1. Essential hypertension    2. Dyslipidemia    3. Prerenal azotemia    4. Encounter for screening for malignant neoplasm of prostate    5. Encounter for long-term (current) use of medications    6. Coronary artery disease involving native coronary artery of native heart without angina pectoris    7. History of prostate cancer    8. Idiopathic chronic gout without tophus, unspecified site        Plan:       Essential hypertension  -     Comprehensive Metabolic Panel; Future; Expected date: 07/05/2023    Dyslipidemia  -     Lipid Panel; Future; Expected date: 07/05/2023    Prerenal azotemia  -     Comprehensive Metabolic Panel; Future; Expected date: 07/05/2023    Encounter for screening for malignant neoplasm of prostate  -     Cancel: PSA, Screening; Future; Expected date: 07/05/2023    Encounter for long-term (current) use of medications  -     CBC Auto Differential; Future; Expected date: 07/05/2023    Coronary artery disease involving native coronary artery of native heart without angina pectoris  -     Lipid Panel; Future; Expected date: 07/05/2023    History of prostate cancer  -     Cancel: Prostate Specific Antigen,  Diagnostic; Future; Expected date: 07/05/2024  -     Prostate Specific Antigen, Diagnostic; Future; Expected date: 07/05/2023    Idiopathic chronic gout without tophus, unspecified site  -     Uric Acid; Future; Expected date: 07/05/2023          Hydrate before labs.   Continue current management and monitor.  Other diagnoses were reviewed and found stable and will continue to monitor.  Counseled on regular exercise, maintenance of a healthy weight, balanced diet rich in fruits/vegetables and lean protein, and avoidance of unhealthy habits like smoking and excessive alcohol intake.   Also, counseled on importance of being compliant with medication, health appointments, diet and exercise.     Follow up in about 1 year (around 7/5/2024).      Medication List with Changes/Refills   Current Medications    ACETAMINOPHEN (TYLENOL) 325 MG TABLET    Take 1 tablet (325 mg total) by mouth every 6 (six) hours as needed for Pain.    AMLODIPINE (NORVASC) 5 MG TABLET    TAKE 1 TABLET(5 MG) BY MOUTH EVERY EVENING    ASPIRIN (ECOTRIN) 81 MG EC TABLET    Take 81 mg by mouth every evening. HOLDING UNTIL INST BY MD    CLOPIDOGREL (PLAVIX) 75 MG TABLET    TAKE 1 TABLET(75 MG) BY MOUTH EVERY DAY    COLCHICINE (COLCRYS) 0.6 MG TABLET    TAKE 1 TABLET NOW AND 1 AT BEDTIME, THEN 1 DAILY    EZETIMIBE (ZETIA) 10 MG TABLET    TAKE 1 TABLET BY MOUTH EVERY DAY    NITROGLYCERIN (NITROSTAT) 0.4 MG SL TABLET    Place 1 tablet (0.4 mg total) under the tongue every 5 (five) minutes as needed for Chest pain.    PRAVASTATIN (PRAVACHOL) 20 MG TABLET    Take 1 tablet (20 mg total) by mouth every evening.    SILDENAFIL (VIAGRA) 100 MG TABLET    Take 1 tablet (100 mg total) by mouth daily as needed for Erectile Dysfunction.    VALSARTAN-HYDROCHLOROTHIAZIDE (DIOVAN-HCT) 320-12.5 MG PER TABLET    TAKE 1 TABLET BY MOUTH EVERY DAY   Discontinued Medications    PRAVASTATIN (PRAVACHOL) 40 MG TABLET    TAKE 1 TABLET(40 MG) BY MOUTH EVERY EVENING       BP  Readings from Last 3 Encounters:   07/05/23 120/80   03/02/23 123/84   02/17/23 120/82     Hemoglobin A1C   Date Value Ref Range Status   10/19/2011 5.5 4.0 - 6.2 % Final     Lab Results   Component Value Date    TSH 2.556 02/02/2023     Lab Results   Component Value Date    LDLCALC 62.2 (L) 06/26/2023    LDLCALC 66.8 02/02/2023    LDLCALC 76.2 02/10/2022     Lab Results   Component Value Date    TRIG 114 06/26/2023    TRIG 106 02/02/2023    TRIG 244 (H) 02/10/2022     Wt Readings from Last 3 Encounters:   07/05/23 91.2 kg (201 lb 1 oz)   03/02/23 91.4 kg (201 lb 8 oz)   02/17/23 91.9 kg (202 lb 11.2 oz)     Lab Results   Component Value Date    HGB 14.0 02/02/2023    HCT 43.6 02/02/2023    WBC 5.63 02/02/2023    ALT 34 06/26/2023    AST 31 06/26/2023     06/26/2023    K 4.9 06/26/2023    CREATININE 1.6 (H) 06/26/2023    PSA 7.0 (H) 12/10/2019           Review of Systems        Objective:      Vitals:    07/05/23 0903   BP: 120/80   Pulse: 73   Resp: 18   Temp: 97.9 °F (36.6 °C)     Physical Exam  Vitals reviewed.   Constitutional:       Appearance: Normal appearance.   Eyes:      Conjunctiva/sclera: Conjunctivae normal.   Cardiovascular:      Rate and Rhythm: Normal rate.   Pulmonary:      Effort: Pulmonary effort is normal.      Breath sounds: Normal breath sounds.   Musculoskeletal:      Cervical back: Normal range of motion.      Comments: Normal ROM bilateral    Skin:     General: Skin is warm and dry.   Neurological:      Mental Status: He is alert.      Cranial Nerves: Cranial nerve deficit: grossly intact.   Psychiatric:      Comments: Alert and orientated

## 2023-08-17 DIAGNOSIS — I10 ESSENTIAL HYPERTENSION: ICD-10-CM

## 2023-08-17 RX ORDER — VALSARTAN AND HYDROCHLOROTHIAZIDE 320; 12.5 MG/1; MG/1
TABLET, FILM COATED ORAL
Qty: 90 TABLET | Refills: 3 | Status: SHIPPED | OUTPATIENT
Start: 2023-08-17

## 2023-08-17 NOTE — TELEPHONE ENCOUNTER
No care due was identified.  [facility] Embedded Care Due Messages. Reference number: [id number].   8/17/2023 7:27:57 AM CDT

## 2023-08-17 NOTE — TELEPHONE ENCOUNTER
Refill Decision Note   Alfonso Ewing  is requesting a refill authorization.  Brief Assessment and Rationale for Refill:  Approve     Medication Therapy Plan:  GFR reviewed...dose change not needed      Comments:     Note composed:11:09 AM 08/17/2023

## 2023-08-17 NOTE — TELEPHONE ENCOUNTER
Refill Routing Note   Medication(s) are not appropriate for processing by [facility] for the following reason(s):      Required labs abnormal    [facility] action(s):  Defer Care Due:  None identified            Appointments  past 12m or future 3m with PCP    Date Provider   Last Visit   7/5/2023 Adrian Raza MD   Next Visit   Visit date not found Adrian Raza MD   ED visits in past 90 days: 0        Note composed:7:39 AM 08/17/2023

## 2023-10-06 ENCOUNTER — TELEPHONE (OUTPATIENT)
Dept: ADMINISTRATIVE | Facility: CLINIC | Age: 73
End: 2023-10-06
Payer: MEDICARE

## 2023-10-09 ENCOUNTER — OFFICE VISIT (OUTPATIENT)
Dept: FAMILY MEDICINE | Facility: CLINIC | Age: 73
End: 2023-10-09
Payer: MEDICARE

## 2023-10-09 VITALS
DIASTOLIC BLOOD PRESSURE: 72 MMHG | HEIGHT: 70 IN | HEART RATE: 51 BPM | BODY MASS INDEX: 29.63 KG/M2 | WEIGHT: 207 LBS | SYSTOLIC BLOOD PRESSURE: 134 MMHG | OXYGEN SATURATION: 97 %

## 2023-10-09 DIAGNOSIS — I65.23 BILATERAL CAROTID ARTERY STENOSIS: ICD-10-CM

## 2023-10-09 DIAGNOSIS — Z00.00 ENCOUNTER FOR PREVENTIVE HEALTH EXAMINATION: Primary | ICD-10-CM

## 2023-10-09 DIAGNOSIS — D69.2 NONTHROMBOCYTOPENIC PURPURA: ICD-10-CM

## 2023-10-09 DIAGNOSIS — N18.30 STAGE 3 CHRONIC KIDNEY DISEASE, UNSPECIFIED WHETHER STAGE 3A OR 3B CKD: ICD-10-CM

## 2023-10-09 DIAGNOSIS — I25.10 CORONARY ARTERY DISEASE INVOLVING NATIVE CORONARY ARTERY OF NATIVE HEART WITHOUT ANGINA PECTORIS: Chronic | ICD-10-CM

## 2023-10-09 DIAGNOSIS — I70.0 AORTIC ATHEROSCLEROSIS: ICD-10-CM

## 2023-10-09 PROCEDURE — G0439 PPPS, SUBSEQ VISIT: HCPCS | Mod: HCNC,S$GLB,, | Performed by: NURSE PRACTITIONER

## 2023-10-09 PROCEDURE — 1101F PR PT FALLS ASSESS DOC 0-1 FALLS W/OUT INJ PAST YR: ICD-10-PCS | Mod: HCNC,CPTII,S$GLB, | Performed by: NURSE PRACTITIONER

## 2023-10-09 PROCEDURE — 99999 PR PBB SHADOW E&M-EST. PATIENT-LVL IV: ICD-10-PCS | Mod: PBBFAC,HCNC,, | Performed by: NURSE PRACTITIONER

## 2023-10-09 PROCEDURE — 3075F PR MOST RECENT SYSTOLIC BLOOD PRESS GE 130-139MM HG: ICD-10-PCS | Mod: HCNC,CPTII,S$GLB, | Performed by: NURSE PRACTITIONER

## 2023-10-09 PROCEDURE — 3078F DIAST BP <80 MM HG: CPT | Mod: HCNC,CPTII,S$GLB, | Performed by: NURSE PRACTITIONER

## 2023-10-09 PROCEDURE — 1160F RVW MEDS BY RX/DR IN RCRD: CPT | Mod: HCNC,CPTII,S$GLB, | Performed by: NURSE PRACTITIONER

## 2023-10-09 PROCEDURE — 1125F AMNT PAIN NOTED PAIN PRSNT: CPT | Mod: HCNC,CPTII,S$GLB, | Performed by: NURSE PRACTITIONER

## 2023-10-09 PROCEDURE — 1159F PR MEDICATION LIST DOCUMENTED IN MEDICAL RECORD: ICD-10-PCS | Mod: HCNC,CPTII,S$GLB, | Performed by: NURSE PRACTITIONER

## 2023-10-09 PROCEDURE — 90694 FLU VACCINE - QUADRIVALENT - ADJUVANTED: ICD-10-PCS | Mod: HCNC,S$GLB,, | Performed by: NURSE PRACTITIONER

## 2023-10-09 PROCEDURE — G0008 FLU VACCINE - QUADRIVALENT - ADJUVANTED: ICD-10-PCS | Mod: HCNC,S$GLB,, | Performed by: NURSE PRACTITIONER

## 2023-10-09 PROCEDURE — 3288F PR FALLS RISK ASSESSMENT DOCUMENTED: ICD-10-PCS | Mod: HCNC,CPTII,S$GLB, | Performed by: NURSE PRACTITIONER

## 2023-10-09 PROCEDURE — G0008 ADMIN INFLUENZA VIRUS VAC: HCPCS | Mod: HCNC,S$GLB,, | Performed by: NURSE PRACTITIONER

## 2023-10-09 PROCEDURE — 3288F FALL RISK ASSESSMENT DOCD: CPT | Mod: HCNC,CPTII,S$GLB, | Performed by: NURSE PRACTITIONER

## 2023-10-09 PROCEDURE — 99999 PR PBB SHADOW E&M-EST. PATIENT-LVL IV: CPT | Mod: PBBFAC,HCNC,, | Performed by: NURSE PRACTITIONER

## 2023-10-09 PROCEDURE — 3078F PR MOST RECENT DIASTOLIC BLOOD PRESSURE < 80 MM HG: ICD-10-PCS | Mod: HCNC,CPTII,S$GLB, | Performed by: NURSE PRACTITIONER

## 2023-10-09 PROCEDURE — 90694 VACC AIIV4 NO PRSRV 0.5ML IM: CPT | Mod: HCNC,S$GLB,, | Performed by: NURSE PRACTITIONER

## 2023-10-09 PROCEDURE — 1160F PR REVIEW ALL MEDS BY PRESCRIBER/CLIN PHARMACIST DOCUMENTED: ICD-10-PCS | Mod: HCNC,CPTII,S$GLB, | Performed by: NURSE PRACTITIONER

## 2023-10-09 PROCEDURE — 1125F PR PAIN SEVERITY QUANTIFIED, PAIN PRESENT: ICD-10-PCS | Mod: HCNC,CPTII,S$GLB, | Performed by: NURSE PRACTITIONER

## 2023-10-09 PROCEDURE — 3075F SYST BP GE 130 - 139MM HG: CPT | Mod: HCNC,CPTII,S$GLB, | Performed by: NURSE PRACTITIONER

## 2023-10-09 PROCEDURE — G0439 PR MEDICARE ANNUAL WELLNESS SUBSEQUENT VISIT: ICD-10-PCS | Mod: HCNC,S$GLB,, | Performed by: NURSE PRACTITIONER

## 2023-10-09 PROCEDURE — 1159F MED LIST DOCD IN RCRD: CPT | Mod: HCNC,CPTII,S$GLB, | Performed by: NURSE PRACTITIONER

## 2023-10-09 PROCEDURE — 1101F PT FALLS ASSESS-DOCD LE1/YR: CPT | Mod: HCNC,CPTII,S$GLB, | Performed by: NURSE PRACTITIONER

## 2023-10-09 NOTE — PROGRESS NOTES
"  Alfonso Ewing presented for a  Medicare AWV and comprehensive Health Risk Assessment today. The following components were reviewed and updated:    Medical history  Family History  Social history  Allergies and Current Medications  Health Risk Assessment  Health Maintenance  Care Team         ** See Completed Assessments for Annual Wellness Visit within the encounter summary.**         The following assessments were completed:  Living Situation  CAGE  Depression Screening  Timed Get Up and Go  Whisper Test  Cognitive Function Screening      Nutrition Screening  ADL Screening  PAQ Screening    Review for Opioid Screening: Patient does not have rx for Opioids.    Review for Substance Use Disorders: Patient does not use substance.     Vitals:    10/09/23 1307   BP: 134/72   BP Location: Left arm   Patient Position: Sitting   BP Method: Medium (Manual)   Pulse: (!) 51   SpO2: 97%   Weight: 93.9 kg (207 lb 0.2 oz)   Height: 5' 10" (1.778 m)     Body mass index is 29.7 kg/m².  Physical Exam  Vitals reviewed.   Constitutional:       General: He is not in acute distress.  HENT:      Head: Normocephalic.   Cardiovascular:      Rate and Rhythm: Normal rate.   Pulmonary:      Effort: Pulmonary effort is normal.   Skin:     General: Skin is warm.   Neurological:      General: No focal deficit present.      Mental Status: He is alert.               Diagnoses and health risks identified today and associated recommendations/orders:    1. Encounter for preventive health examination  Reviewed health maintenance and provided recommendations    Flu vaccine today    2. Stage 3 chronic kidney disease, unspecified whether stage 3a or 3b CKD  Continue to monitor  Followed by Adrian Raza MD   Encourage adequate water intake  Avoid nsaids.      3. Aortic atherosclerosis  Continue to monitor  Followed by Dr Borrero.      4. Coronary artery disease involving native coronary artery of native heart without angina pectoris  Continue to " monitor  Followed by Dr Borrero  No CP.      5.  Carotid artery disease  Continue to monitor  Followed by Dr Borrero.     7/25/19    6.  Nonthrombcytopenic purpura  Continue to monitor  Followed by Adrian Raza MD .        Provided Alfonso with a 5-10 year written screening schedule and personal prevention plan. Recommendations were developed using the USPSTF age appropriate recommendations. Education, counseling, and referrals were provided as needed. After Visit Summary printed and given to patient which includes a list of additional screenings\tests needed.    Follow up in one year    VIRAJ Saleh offered to discuss advanced care planning, including how to pick a person who would make decisions for you if you were unable to make them for yourself, called a health care power of , and what kind of decisions you might make such as use of life sustaining treatments such as ventilators and tube feeding when faced with a life limiting illness recorded on a living will that they will need to know. (How you want to be cared for as you near the end of your natural life)     X Patient is interested in learning more about how to make advanced directives.  I provided them paperwork and offered to discuss this with them.

## 2023-10-09 NOTE — PATIENT INSTRUCTIONS
Counseling and Referral of Other Preventative  (Italic type indicates deductible and co-insurance are waived)    Patient Name: Alfonso Ewing  Today's Date: 10/9/2023    Health Maintenance       Date Due Completion Date    High Dose Statin Never done ---    COVID-19 Vaccine (3 - 2023-24 season) 09/01/2023 3/18/2022    Colorectal Cancer Screening 01/31/2024 1/31/2019    Aspirin/Antiplatelet Therapy 10/09/2024 10/9/2023    Lipid Panel 06/26/2028 6/26/2023    TETANUS VACCINE 07/13/2028 7/13/2018        Orders Placed This Encounter   Procedures    Influenza - Quadrivalent (Adjuvanted)       The following information is provided to all patients.  This information is to help you find resources for any of the problems found today that may be affecting your health:                Living healthy guide: www.[facility].[email or web address].[email or web address]      Understanding Diabetes: www.diabetes.org      Eating healthy: www.cdc.gov/healthyweight      [facility] home safety checklist: www.cdc.gov/steadi/patient.html      Agency on Aging: www.goea.[place]      Alcoholics anonymous (AA): www.aa.org      Physical Activity: www.kathya.nih.gov/iz5osqt      Tobacco use: www.quitwithusla.org

## 2023-10-24 PROBLEM — D69.2 NONTHROMBOCYTOPENIC PURPURA: Status: ACTIVE | Noted: 2023-10-24

## 2023-10-24 PROBLEM — I65.23 BILATERAL CAROTID ARTERY STENOSIS: Status: ACTIVE | Noted: 2022-11-07

## 2024-01-11 DIAGNOSIS — Z00.00 ENCOUNTER FOR MEDICARE ANNUAL WELLNESS EXAM: ICD-10-CM

## 2024-02-28 ENCOUNTER — TELEPHONE (OUTPATIENT)
Dept: DERMATOLOGY | Facility: CLINIC | Age: 74
End: 2024-02-28
Payer: MEDICARE

## 2024-02-28 NOTE — TELEPHONE ENCOUNTER
----- Message from Chantelle Tabithaarti sent at 2/28/2024 12:46 PM CST -----  Regarding: eca  Contact: patient  Type:  Sooner Appointment Request    Caller is requesting a sooner appointment.  Caller declined first available appointment listed below.  Caller will not accept being placed on the waitlist and is requesting a message be sent to doctor.    Name of Caller:  patient   When is the first available appointment?  Seeking April or may   Symptoms:  eca skin check:  nose and arms.   Would the patient rather a call back or a response via [facility]?   Best Call Back Number:  [phone]    Additional Information:  call to be seen thanks.

## 2024-03-04 ENCOUNTER — TELEPHONE (OUTPATIENT)
Dept: DERMATOLOGY | Facility: CLINIC | Age: 74
End: 2024-03-04
Payer: MEDICARE

## 2024-03-04 ENCOUNTER — PATIENT MESSAGE (OUTPATIENT)
Dept: DERMATOLOGY | Facility: CLINIC | Age: 74
End: 2024-03-04
Payer: MEDICARE

## 2024-03-04 NOTE — TELEPHONE ENCOUNTER
----- Message from Isabelle Escalera sent at 3/1/2024  1:50 PM CST -----  Type:  Patient Returning Call    Who Called:  pt wife   Who Left Message for Patient:  nurse lemuel  Does the patient know what this is regarding?:  yes   Best Call Back Number:  [phone]    Additional Information:  please advise

## 2024-05-23 ENCOUNTER — OFFICE VISIT (OUTPATIENT)
Dept: DERMATOLOGY | Facility: CLINIC | Age: 74
End: 2024-05-23
Payer: MEDICARE

## 2024-05-23 DIAGNOSIS — L91.8 ACROCHORDON: Primary | ICD-10-CM

## 2024-05-23 DIAGNOSIS — L57.0 AK (ACTINIC KERATOSIS): ICD-10-CM

## 2024-05-23 DIAGNOSIS — D18.01 CHERRY ANGIOMA: ICD-10-CM

## 2024-05-23 DIAGNOSIS — L73.8 SEBACEOUS HYPERPLASIA: ICD-10-CM

## 2024-05-23 DIAGNOSIS — L28.2 PRURIGO: ICD-10-CM

## 2024-05-23 DIAGNOSIS — D48.5 NEOPLASM OF UNCERTAIN BEHAVIOR OF SKIN: ICD-10-CM

## 2024-05-23 PROCEDURE — 88305 TISSUE EXAM BY PATHOLOGIST: CPT | Mod: 26,HCNC,, | Performed by: PATHOLOGY

## 2024-05-23 PROCEDURE — 17110 DESTRUCTION B9 LES UP TO 14: CPT | Mod: HCNC,XS,S$GLB, | Performed by: DERMATOLOGY

## 2024-05-23 PROCEDURE — 99203 OFFICE O/P NEW LOW 30 MIN: CPT | Mod: 25,HCNC,S$GLB, | Performed by: DERMATOLOGY

## 2024-05-23 PROCEDURE — 17004 DESTROY PREMAL LESIONS 15/>: CPT | Mod: HCNC,S$GLB,, | Performed by: DERMATOLOGY

## 2024-05-23 PROCEDURE — 11102 TANGNTL BX SKIN SINGLE LES: CPT | Mod: HCNC,XS,S$GLB, | Performed by: DERMATOLOGY

## 2024-05-23 PROCEDURE — 88305 TISSUE EXAM BY PATHOLOGIST: CPT | Mod: 59,HCNC,PO | Performed by: PATHOLOGY

## 2024-05-23 PROCEDURE — 11103 TANGNTL BX SKIN EA SEP/ADDL: CPT | Mod: HCNC,XS,S$GLB, | Performed by: DERMATOLOGY

## 2024-05-23 PROCEDURE — 99999 PR PBB SHADOW E&M-EST. PATIENT-LVL II: CPT | Mod: PBBFAC,HCNC,, | Performed by: DERMATOLOGY

## 2024-05-23 NOTE — PATIENT INSTRUCTIONS
Shave Biopsy Wound Care    Your doctor has performed a shave biopsy today.  A band aid and vaseline ointment has been placed over the site.  This should remain in place for NO LONGER THAN 48 hours.  It is fine to remove the bandaid after 24 hours, if the area is no longer bleeding. It is recommended that you keep the area dry (do not wet)) for the first 24 hours.  After 24 hours, wash the area with warm soap and water and apply Vaseline jelly.  Many patients prefer to use Neosporin or Bacitracin ointment.  This is acceptable; however, know that you can develop an allergy to this medication even if you have used it safely for years.  It is important to keep the area moist.  Letting it dry out and get air slows healing time, and will worsen the scar.        If you notice increasing redness, tenderness, pain, or yellow drainage at the biopsy site, please notify your doctor.  These are signs of an infection.    If your biopsy site is bleeding, apply firm pressure for 15 minutes straight.  Repeat for another 15 minutes, if it is still bleeding.   If the surgical site continues to bleed, then please contact your doctor.      For [facility] users:   You will receive your biopsy results in [facility] as soon as they are available. Please be assured that your physician/provider will review your results and will then determine what further treatment, evaluation, or planning is required. You should be contacted by your physician's/provider's office within 5 business days of receiving your results; If not, please reach out to directly. This is one more way [facility] is putting you first.     [facility] [place] [address]/ [phone] FAX/ [email or web address]     CRYOSURGERY      Your doctor has used a method called cryosurgery to treat your skin condition. Cryosurgery refers to the use of very cold substances to treat a variety of skin conditions such as warts, pre-skin cancers, molluscum contagiosum,  sun spots, and several benign growths. The substance we use in cryosurgery is liquid nitrogen and is so cold (-195 degrees Celsius) that is burns when administered.     Following treatment in the office, the skin may immediately burn and become red. You may find the area around the lesion is affected as well. It is sometimes necessary to treat not only the lesion, but a small area of the surrounding normal skin to achieve a good response.     A blister, and even a blood filled blister, may form after treatment.   This is a normal response. If the blister is painful, it is acceptable to sterilize a needle and with rubbing alcohol and gently pop the blister. It is important that you gently wash the area with soap and warm water as the blister fluid may contain wart virus if a wart was treated. Do no remove the roof of the blister.     The area treated can take anywhere from 1-3 weeks to heal. Healing time depends on the kind skin lesion treated, the location, and how aggressively the lesion was treated. It is recommended that the areas treated are covered with Vaseline or bacitracin ointment and a band-aid. If a band-aid is not practical, just ointment applied several times per day will do. Keeping these areas moist will speed the healing time.    Treatment with liquid nitrogen can leave a scar. In dark skin, it may be a light or dark scar, in light skin it may be a white or pink scar. These will generally fade with time, but may never go away completely.     If you have any concerns after your treatment, please feel free to call the office.       [facility] [place] [address]/ [phone] FAX/ [email or web address] What Are the Symptoms of Skin Cancer?  A change in your skin is the most common sign of skin cancer. This could be a new growth, a sore that doesnt heal, or a change in a mole. Not all skin cancers look the same.    For melanoma specifically, a simple way to remember the warning  signs is to remember the A-B-C-D-Es of melanoma--    A stands for asymmetrical. Does the mole or spot have an irregular shape with two parts that look very different?  B stands for border. Is the border irregular or jagged?  C is for color. Is the color uneven?  D is for diameter. Is the mole or spot larger than the size of a pea?  E is for evolving. Has the mole or spot changed during the past few weeks or months?    Talk to your doctor if you notice changes in your skin such as a new growth, a sore that doesnt heal, a change in an old growth, or any of the A-B-C-D-Es of melanoma    What Can I Do to Reduce My Risk of Skin Cancer?  Protection from ultraviolet (UV) radiation is important all year, not just during the summer or at the beach. UV rays from the sun can reach you on cloudy and hazy days, not just on bright and kimberly days. UV rays also reflect off of surfaces like water, cement, sand, and snow. Indoor tanning (using a tanning bed, marroquin, or sunlamp to get tan) exposes users to UV radiation.    The hours between 10 a.m. and 4 p.m. Daylight Saving Time (9 a.m. to 3 p.m. standard time) are the most hazardous for UV exposure outdoors in the continental [place]. UV rays from sunlight are the greatest during the late spring and early summer in [place].    CDC recommends easy options for protection from UV radiation--    Stay in the shade or indoors, especially during midday hours.  Wear clothing that covers your arms and legs.  Wear a hat with a wide brim to shade your face, head, ears, and neck.  Wear sunglasses that wrap around and block both UVA and UVB rays.  Use sunscreen with a sun protection factor (SPF) of 30 or higher, and both UVA and UVB (broad spectrum) protection.  Avoid indoor tanning.    Adapted from https://www.cdc.gov/cancer/skin/basic_info/

## 2024-05-23 NOTE — PROGRESS NOTES
Subjective:      Patient ID:  Alfonso Ewing is a 74 y.o. male who presents for No chief complaint on file.    HPI    Re-establishing patient. Here for spots at nose, forearms. Hx of AKs, past cryotherapy and Efudex use.   No personal or known family hx skin cancer.  Extensive sun exposure history - worked outdoors, fishing / gardening.     Review of Systems    Objective:   Physical Exam   Constitutional: He appears well-developed and well-nourished. No distress.   Neurological: He is alert and oriented to person, place, and time. He is not disoriented.   Psychiatric: He has a normal mood and affect.   Skin:                    Diagram Legend     Erythematous scaling macule/papule c/w actinic keratosis       Vascular papule c/w angioma      Pigmented verrucoid papule/plaque c/w seborrheic keratosis      Yellow umbilicated papule c/w sebaceous hyperplasia      Irregularly shaped tan macule c/w lentigo     1-2 mm smooth white papules consistent with Milia      Movable subcutaneous cyst with punctum c/w epidermal inclusion cyst      Subcutaneous movable cyst c/w pilar cyst      Firm pink to brown papule c/w dermatofibroma      Pedunculated fleshy papule(s) c/w skin tag(s)      Evenly pigmented macule c/w junctional nevus     Mildly variegated pigmented, slightly irregular-bordered macule c/w mildly atypical nevus      Flesh colored to evenly pigmented papule c/w intradermal nevus       Pink pearly papule/plaque c/w basal cell carcinoma      Erythematous hyperkeratotic cursted plaque c/w SCC      Surgical scar with no sign of skin cancer recurrence      Open and closed comedones      Inflammatory papules and pustules      Verrucoid papule consistent consistent with wart     Erythematous eczematous patches and plaques     Dystrophic onycholytic nail with subungual debris c/w onychomycosis     Umbilicated papule    Erythematous-base heme-crusted tan verrucoid plaque consistent with inflamed seborrheic keratosis      Erythematous Silvery Scaling Plaque c/w Psoriasis     See annotation                    Assessment / Plan:      Pathology Orders:       Normal Orders This Visit    Specimen to Pathology, Dermatology     Comments:    Number of Specimens:->5  ------------------------->-------------------------  Spec 1 Procedure:->Biopsy  Spec 1 Clinical Impression:->r/o bcc  Spec 1 Source:->nasal tip  ------------------------->-------------------------  Spec 2 Procedure:->Biopsy  Spec 2 Clinical Impression:->r/o scc vs hak  Spec 2 Source:->right forearm  ------------------------->-------------------------  Spec 3 Procedure:->Biopsy  Spec 3 Clinical Impression:->r/o scc vs hak  Spec 3 Source:->right hand  ------------------------->-------------------------  Spec 4 Procedure:->Biopsy  Spec 4 Clinical Impression:->r/o scc vs hak vs porokeratosis  Spec 4 Source:->left forearm  ------------------------->-------------------------  Spec 5 Procedure:->Biopsy  Spec 5 Clinical Impression:->blk r/o bcc vs hypomelanotic mm  Spec 5 Source:->left elbow  Release to patient->Immediate    Questions:    Procedure Type: Dermatology and skin neoplasms    Number of Specimens: 5    ------------------------: -------------------------    Spec 1 Procedure: Biopsy    Spec 1 Clinical Impression: r/o bcc    Spec 1 Source: nasal tip    ------------------------: -------------------------    Spec 2 Procedure: Biopsy    Spec 2 Clinical Impression: r/o scc vs hak    Spec 2 Source: right forearm    ------------------------: -------------------------    Spec 3 Procedure: Biopsy    Spec 3 Clinical Impression: r/o scc vs hak    Spec 3 Source: right hand    ------------------------: -------------------------    Spec 4 Procedure: Biopsy    Spec 4 Clinical Impression: r/o scc vs hak vs porokeratosis    Spec 4 Source: left forearm    ------------------------: -------------------------    Spec 5 Procedure: Biopsy    Spec 5 Clinical Impression: blk r/o bcc vs hypomelanotic mm     Spec 5 Source: left elbow    Release to patient: Immediate          Neoplasm of uncertain behavior of skin  -     Specimen to Pathology, Dermatology  - Discussed diagnosis with patient and explained uncertain nature of condition, including ddx.   - Discussed treatment options (biopsy, close monitoring) with patient, including the risks and benefits of each. Patient opted to pursue biopsy.  - Shave Biopsy Procedure Note: Discussed procedure with patient/patient's guardian including risks and benefits as well as treatment alternatives. Risks of procedure include pain, bleeding, infection, post-inflammatory pigmentary alteration, scar, recurrence. Patient informed that the purpose of a biopsy is sampling of condition in question rather than removal in entirety; further treatment may be necessary. Verbal consent obtained. Area to be biopsied marked and cleansed with alcohol. Local anesthesia achieved by injecting approximately 1 cc of 1% lidocaine with epinephrine. Five shave biopsies performed using a double edge razor blade; specimens submitted to pathology. Hemostasis achieved with aluminum chloride. Petroleum jelly and bandage applied to wounds. Patient tolerated procedures well. After-visit wound care instructions reviewed and provided in writing.      AK (actinic keratosis)  - Discussed diagnosis, etiology, and precancerous nature of condition.   - Cryosurgery Procedure Note: Discussed procedure with patient/patient's guardian including risks and benefits as well as treatment alternatives. Risks of procedure include pain, itching, swelling, redness, blistering, crusting, wound formation, post-inflammatory pigmentary alteration, scar, recurrence. Verbal consent obtained. LN2 cryosurgery performed to 15+ lesion(s). Patient tolerated procedure well. After-visit wound care instructions reviewed and provided in writing.      Prurigo  - Discussed diagnosis, etiology, and treatment options.   - Cryosurgery Procedure  Note: Discussed procedure with patient/patient's guardian including risks and benefits as well as treatment alternatives. Risks of procedure include pain, itching, swelling, redness, blistering, crusting, wound formation, post-inflammatory pigmentary alteration, scar, recurrence. Verbal consent obtained. LN2 cryosurgery performed to 1 lesion(s). Patient tolerated procedure well. After-visit wound care instructions reviewed and provided in writing.     Acrochordon  Cherry angioma  Sebaceous hyperplasia  - Benign; reassured treatment not necessary.             Follow up for pending pathology.

## 2024-05-28 LAB
FINAL PATHOLOGIC DIAGNOSIS: NORMAL
GROSS: NORMAL
Lab: NORMAL
MICROSCOPIC EXAM: NORMAL

## 2024-06-01 NOTE — PROGRESS NOTES
1. Skin, nasal tip, shave biopsy:  -BASAL CELL CARCINOMA, SUPERFICIAL TYPE, FOCALLY EXTENDING TO THE DEEP BIOPSY EDGES, see comment  COMMENT:  This lesion has the pattern of a superficial basal cell carcinoma. However, due to the superficial nature of the biopsy, the presence of a deeper basal cell carcinoma cannot be excluded completely.  This lesion is skin cancer. You will be contacted regarding treatment.  2. Skin, right forearm, shave biopsy:  -HYPERTROPHIC ACTINIC KERATOSIS, INVOLVING HAIR FOLLICLES AND EXTENDING TO THE BASE OF THE BIOPSY  3. Skin, right hand, shave biopsy:  -HYPERTROPHIC ACTINIC KERATOSIS, INVOLVING HAIR FOLLICLES AND EXTENDING TO THE BASE OF THE BIOPSY  4. Skin, left forearm, shave biopsy:  -HYPERTROPHIC ACTINIC KERATOSIS  5. Skin, left elbow, shave biopsy:  -BASAL CELL CARCINOMA, SUPERFICIAL TYPE, EXTENDING TO A PERIPHERAL BIOPSY EDGE     Please call to discuss results / plan / schedule:   Bx confirmed sBCC x 2 and thick precancers x 3. Recommend in person appt to discuss treatment options within next month. Possibly punch bx at nasal tip to assess depth which may influence treatment options. Thank you

## 2024-06-10 ENCOUNTER — OFFICE VISIT (OUTPATIENT)
Dept: DERMATOLOGY | Facility: CLINIC | Age: 74
End: 2024-06-10
Payer: MEDICARE

## 2024-06-10 DIAGNOSIS — D48.5 NEOPLASM OF UNCERTAIN BEHAVIOR OF SKIN: Primary | ICD-10-CM

## 2024-06-10 DIAGNOSIS — C44.619: ICD-10-CM

## 2024-06-10 DIAGNOSIS — C44.91 SUPERFICIAL BASAL CELL CARCINOMA: ICD-10-CM

## 2024-06-10 PROCEDURE — 11104 PUNCH BX SKIN SINGLE LESION: CPT | Mod: HCNC,XS,S$GLB, | Performed by: DERMATOLOGY

## 2024-06-10 PROCEDURE — 88305 TISSUE EXAM BY PATHOLOGIST: CPT | Mod: HCNC,PO | Performed by: PATHOLOGY

## 2024-06-10 PROCEDURE — 99499 UNLISTED E&M SERVICE: CPT | Mod: HCNC,S$GLB,, | Performed by: DERMATOLOGY

## 2024-06-10 PROCEDURE — 17262 DSTRJ MAL LES T/A/L 1.1-2.0: CPT | Mod: HCNC,S$GLB,, | Performed by: DERMATOLOGY

## 2024-06-10 PROCEDURE — 88305 TISSUE EXAM BY PATHOLOGIST: CPT | Mod: 26,HCNC,, | Performed by: PATHOLOGY

## 2024-06-10 PROCEDURE — 99999 PR PBB SHADOW E&M-EST. PATIENT-LVL I: CPT | Mod: PBBFAC,HCNC,, | Performed by: DERMATOLOGY

## 2024-06-10 RX ORDER — DOXYCYCLINE 100 MG/1
100 CAPSULE ORAL EVERY 12 HOURS
Qty: 20 CAPSULE | Refills: 0 | Status: SHIPPED | OUTPATIENT
Start: 2024-06-10 | End: 2024-06-20

## 2024-06-10 NOTE — PATIENT INSTRUCTIONS
Punch Biopsy Wound Care    Your doctor has performed a punch biopsy today.  A band aid and antibiotic ointment has been placed over the site.  This should remain in place for 24 hours.  It is recommended that you keep the area dry for the first 24 hours.  After 24 hours, you may remove the band aid and wash the area with warm soap and water and apply Vaseline jelly.  Many patients prefer to use Neosporin or Bacitracin ointment.  This is acceptable; however know that you can develop an allergy to this medication even if you have used it safely for years.  It is important to keep the area moist.  Letting it dry out and get air slows healing time, will worsen the scar, and make it more difficult to remove the stitches if they were placed.  Band aid is optional after first 24 hours.      If you notice increasing redness, tenderness, pain, or yellow drainage at the biopsy or surgical site, please notify your doctor.  These are signs of an infection.    If your biopsy/surgical site is bleeding, apply firm pressure for 15 minutes straight.  Repeat for another 15 minutes, if it is still bleeding.   If the surgical site continues to bleed, then please contact your doctor.      For [facility] users:   You will receive your biopsy results in [facility] as soon as they are available. Please be assured that your physician/provider will review your results and will then determine what further treatment, evaluation, or planning is required. You should be contacted by your physician's/provider's office within 5 business days of receiving your results; If not, please reach out to directly. This is one more way [facility] is putting you first.       [facility] [place] [address]/ [phone] FAX/ [email or web address]      Wound Care    A band aid and vaseline ointment has been placed over the site.  This should remain in place for 24 hours.  It is recommended that you keep the area dry for the first 24 hours.   After 24 hours, you may remove the band aid and wash the area with warm soap and water and apply Vaseline jelly.  Many patients prefer to use Neosporin or Bacitracin ointment.  This is acceptable; however, know that you can develop an allergy to this medication even if you have used it safely for years.  It is important to keep the area moist.  Letting it dry out and get air slows healing time, and will worsen the scar.  Band aid is optional after first 24 hours.      If you notice increasing redness, tenderness, pain, or yellow drainage at the site, please notify your doctor.  These are signs of an infection.    If your site is bleeding, apply firm pressure for 15 minutes straight.  Repeat for another 15 minutes, if it is still bleeding.   If the surgical site continues to bleed, then please contact your doctor.      [address] [place] [address]/ [phone] FAX/ [email or web address]

## 2024-06-10 NOTE — PROGRESS NOTES
Neoplasm of uncertain behavior of skin  -     doxycycline (VIBRAMYCIN) 100 MG Cap; Take 1 capsule (100 mg total) by mouth every 12 (twelve) hours. Take with food; do not lay down 1-2 hours after taking, caution in sun. for 10 days  Dispense: 20 capsule; Refill: 0  -     Specimen to Pathology, Dermatology  Superficial basal cell carcinoma  1. Skin, nasal tip, shave biopsy:  -BASAL CELL CARCINOMA, SUPERFICIAL TYPE, FOCALLY EXTENDING TO THE DEEP BIOPSY EDGES, see comment  COMMENT:  This lesion has the pattern of a superficial basal cell carcinoma. However, due to the superficial nature of the biopsy, the presence of a deeper basal cell carcinoma cannot be excluded completely.  This lesion is skin cancer. You will be  contacted regarding treatment.  - Discussed diagnosis, etiology, and treatment options - punch bx to further characterize, Mohs, Aldara.  - Punch Biopsy Procedure Note: Discussed procedure with patient/patient's guardian including risks and benefits as well as treatment alternatives. Risks of procedure include pain, bleeding, infection, post-inflammatory pigmentary alteration, scar, recurrence. Patient informed that the purpose of a biopsy is sampling of condition in question rather than removal in entirety; further treatment may be necessary. Verbal consent obtained. Area to be biopsied marked and cleansed with alcohol. Local anesthesia achieved by injecting approximately 1 cc of 1% lidocaine with epinephrine. One punch biopsy performed using a 3 mm disposable punch; specimen submitted to pathology. Hemostasis and closure achieved with 5-0 Prolene sutures. Petroleum jelly and bandage applied to wound. Patient tolerated procedure well. After-visit wound care instructions reviewed and provided in writing. F/u 7 days for S/R.       Basal cell carcinoma of elbow, left  -     doxycycline (VIBRAMYCIN) 100 MG Cap; Take 1 capsule (100 mg total) by mouth every 12 (twelve) hours. Take with food; do not lay down  1-2 hours after taking, caution in sun. for 10 days  Dispense: 20 capsule; Refill: 0   - Discussed malignant diagnosis with patient and need for definitive treatment.   - Discussed treatment options with patient, including the risks and benefits of each. Patient opted for EDC.   - Electrodessication and Curettage Procedure Note: Discussed procedure with patient/patient's guardian including risks and benefits as well as treatment alternatives. Risks of procedure include pain, bleeding, infection, post-inflammatory pigmentary alteration, scar, recurrence. Verbal consent obtained. Area to be treated marked and cleansed with alcohol. Local anesthesia achieved by injection of 1% lidocaine with epinephrine. Curettage and desiccation x 3 performed. Lesion size after primary curettage: 1.1 cm. Petroleum jelly and bandage applied to wound. Patient tolerated procedure well. After-visit wound care instructions reviewed and provided in writing. F/u 3 months, PRN sooner.      3 month f/u, sooner pending path / prn

## 2024-06-14 LAB
FINAL PATHOLOGIC DIAGNOSIS: NORMAL
GROSS: NORMAL
Lab: NORMAL
MICROSCOPIC EXAM: NORMAL

## 2024-06-17 ENCOUNTER — CLINICAL SUPPORT (OUTPATIENT)
Dept: DERMATOLOGY | Facility: CLINIC | Age: 74
End: 2024-06-17
Payer: MEDICARE

## 2024-06-17 ENCOUNTER — PATIENT MESSAGE (OUTPATIENT)
Dept: DERMATOLOGY | Facility: CLINIC | Age: 74
End: 2024-06-17

## 2024-06-17 DIAGNOSIS — Z48.02 ENCOUNTER FOR REMOVAL OF SUTURES: Primary | ICD-10-CM

## 2024-06-17 DIAGNOSIS — C44.91 SUPERFICIAL BASAL CELL CARCINOMA: Primary | ICD-10-CM

## 2024-06-17 PROCEDURE — 99999 PR PBB SHADOW E&M-EST. PATIENT-LVL II: CPT | Mod: PBBFAC,HCNC,,

## 2024-06-17 PROCEDURE — 99024 POSTOP FOLLOW-UP VISIT: CPT | Mod: HCNC,S$GLB,, | Performed by: DERMATOLOGY

## 2024-06-17 RX ORDER — IMIQUIMOD 12.5 MG/.25G
CREAM TOPICAL
Qty: 48 PACKET | Refills: 1 | Status: SHIPPED | OUTPATIENT
Start: 2024-06-17

## 2024-06-17 NOTE — PROGRESS NOTES
Subjective:      Patient ID:  Alfonso Ewing is a 74 y.o. male who presents for No chief complaint on file.    HPI  Patient presents for suture removal. The wound is well healed without signs of infection. The sutures are removed. Wound care and activity instructions given. Return prn.   Review of Systems    Objective:   Physical Exam     Diagram Legend     Erythematous scaling macule/papule c/w actinic keratosis       Vascular papule c/w angioma      Pigmented verrucoid papule/plaque c/w seborrheic keratosis      Yellow umbilicated papule c/w sebaceous hyperplasia      Irregularly shaped tan macule c/w lentigo     1-2 mm smooth white papules consistent with Milia      Movable subcutaneous cyst with punctum c/w epidermal inclusion cyst      Subcutaneous movable cyst c/w pilar cyst      Firm pink to brown papule c/w dermatofibroma      Pedunculated fleshy papule(s) c/w skin tag(s)      Evenly pigmented macule c/w junctional nevus     Mildly variegated pigmented, slightly irregular-bordered macule c/w mildly atypical nevus      Flesh colored to evenly pigmented papule c/w intradermal nevus       Pink pearly papule/plaque c/w basal cell carcinoma      Erythematous hyperkeratotic cursted plaque c/w SCC      Surgical scar with no sign of skin cancer recurrence      Open and closed comedones      Inflammatory papules and pustules      Verrucoid papule consistent consistent with wart     Erythematous eczematous patches and plaques     Dystrophic onycholytic nail with subungual debris c/w onychomycosis     Umbilicated papule    Erythematous-base heme-crusted tan verrucoid plaque consistent with inflamed seborrheic keratosis     Erythematous Silvery Scaling Plaque c/w Psoriasis     See annotation      Assessment / Plan:        There are no diagnoses linked to this encounter.         No follow-ups on file.

## 2024-06-17 NOTE — PROGRESS NOTES
Skin, nasal tip, punch re-biopsy:  -SCAR (POST-SURGICAL)  -NEGATIVE FOR RESIDUAL BASAL CELL CARCINOMA    Repeat biopsy not showing any BCC. Recommend imiquimod cream to entire nasal tip (prior biopsy showing sBCC) qhs M-F x 6 weeks. To increase to qhs if no significant irritation at 2 weeks; to decrease to qhs MWF if significantly irritated at 2 weeks. Please  on protocol, SE, precautions. Please pend rx to correct pharmacy and schedule 3 month f/u visit to re-evaluate site. Thank you!

## 2024-06-27 ENCOUNTER — LAB VISIT (OUTPATIENT)
Dept: LAB | Facility: HOSPITAL | Age: 74
End: 2024-06-27
Attending: INTERNAL MEDICINE
Payer: MEDICARE

## 2024-06-27 DIAGNOSIS — Z79.899 ENCOUNTER FOR LONG-TERM (CURRENT) USE OF MEDICATIONS: ICD-10-CM

## 2024-06-27 DIAGNOSIS — I25.10 CORONARY ARTERY DISEASE INVOLVING NATIVE CORONARY ARTERY OF NATIVE HEART WITHOUT ANGINA PECTORIS: Chronic | ICD-10-CM

## 2024-06-27 DIAGNOSIS — Z85.46 HISTORY OF PROSTATE CANCER: ICD-10-CM

## 2024-06-27 DIAGNOSIS — M1A.00X0 IDIOPATHIC CHRONIC GOUT WITHOUT TOPHUS, UNSPECIFIED SITE: ICD-10-CM

## 2024-06-27 DIAGNOSIS — R79.89 PRERENAL AZOTEMIA: ICD-10-CM

## 2024-06-27 DIAGNOSIS — E78.5 DYSLIPIDEMIA: ICD-10-CM

## 2024-06-27 DIAGNOSIS — I10 ESSENTIAL HYPERTENSION: ICD-10-CM

## 2024-06-27 LAB
ALBUMIN SERPL BCP-MCNC: 4 G/DL (ref 3.5–5.2)
ALP SERPL-CCNC: 42 U/L (ref 55–135)
ALT SERPL W/O P-5'-P-CCNC: 34 U/L (ref 10–44)
ANION GAP SERPL CALC-SCNC: 10 MMOL/L (ref 8–16)
AST SERPL-CCNC: 27 U/L (ref 10–40)
BASOPHILS # BLD AUTO: 0.04 K/UL (ref 0–0.2)
BASOPHILS NFR BLD: 0.6 % (ref 0–1.9)
BILIRUB SERPL-MCNC: 0.8 MG/DL (ref 0.1–1)
BUN SERPL-MCNC: 24 MG/DL (ref 8–23)
CALCIUM SERPL-MCNC: 10.2 MG/DL (ref 8.7–10.5)
CHLORIDE SERPL-SCNC: 102 MMOL/L (ref 95–110)
CHOLEST SERPL-MCNC: 146 MG/DL (ref 120–199)
CHOLEST/HDLC SERPL: 3.9 {RATIO} (ref 2–5)
CO2 SERPL-SCNC: 28 MMOL/L (ref 23–29)
COMPLEXED PSA SERPL-MCNC: <0.01 NG/ML (ref 0–4)
CREAT SERPL-MCNC: 1.6 MG/DL (ref 0.5–1.4)
DIFFERENTIAL METHOD BLD: ABNORMAL
EOSINOPHIL # BLD AUTO: 0.1 K/UL (ref 0–0.5)
EOSINOPHIL NFR BLD: 1.5 % (ref 0–8)
ERYTHROCYTE [DISTWIDTH] IN BLOOD BY AUTOMATED COUNT: 12.4 % (ref 11.5–14.5)
EST. GFR  (NO RACE VARIABLE): 44.4 ML/MIN/1.73 M^2
GLUCOSE SERPL-MCNC: 114 MG/DL (ref 70–110)
HCT VFR BLD AUTO: 43.7 % (ref 40–54)
HDLC SERPL-MCNC: 37 MG/DL (ref 40–75)
HDLC SERPL: 25.3 % (ref 20–50)
HGB BLD-MCNC: 15.2 G/DL (ref 14–18)
IMM GRANULOCYTES # BLD AUTO: 0.02 K/UL (ref 0–0.04)
IMM GRANULOCYTES NFR BLD AUTO: 0.3 % (ref 0–0.5)
LDLC SERPL CALC-MCNC: 63.4 MG/DL (ref 63–159)
LYMPHOCYTES # BLD AUTO: 1.5 K/UL (ref 1–4.8)
LYMPHOCYTES NFR BLD: 21 % (ref 18–48)
MCH RBC QN AUTO: 31.8 PG (ref 27–31)
MCHC RBC AUTO-ENTMCNC: 34.8 G/DL (ref 32–36)
MCV RBC AUTO: 91 FL (ref 82–98)
MONOCYTES # BLD AUTO: 0.6 K/UL (ref 0.3–1)
MONOCYTES NFR BLD: 8.7 % (ref 4–15)
NEUTROPHILS # BLD AUTO: 4.9 K/UL (ref 1.8–7.7)
NEUTROPHILS NFR BLD: 67.9 % (ref 38–73)
NONHDLC SERPL-MCNC: 109 MG/DL
NRBC BLD-RTO: 0 /100 WBC
PLATELET # BLD AUTO: 178 K/UL (ref 150–450)
PMV BLD AUTO: 12.5 FL (ref 9.2–12.9)
POTASSIUM SERPL-SCNC: 4.5 MMOL/L (ref 3.5–5.1)
PROT SERPL-MCNC: 7 G/DL (ref 6–8.4)
RBC # BLD AUTO: 4.78 M/UL (ref 4.6–6.2)
SODIUM SERPL-SCNC: 140 MMOL/L (ref 136–145)
TRIGL SERPL-MCNC: 228 MG/DL (ref 30–150)
URATE SERPL-MCNC: 9.3 MG/DL (ref 3.4–7)
WBC # BLD AUTO: 7.27 K/UL (ref 3.9–12.7)

## 2024-06-27 PROCEDURE — 80061 LIPID PANEL: CPT | Mod: HCNC | Performed by: INTERNAL MEDICINE

## 2024-06-27 PROCEDURE — 84153 ASSAY OF PSA TOTAL: CPT | Mod: HCNC | Performed by: INTERNAL MEDICINE

## 2024-06-27 PROCEDURE — 36415 COLL VENOUS BLD VENIPUNCTURE: CPT | Mod: HCNC,PO | Performed by: INTERNAL MEDICINE

## 2024-06-27 PROCEDURE — 84550 ASSAY OF BLOOD/URIC ACID: CPT | Mod: HCNC | Performed by: INTERNAL MEDICINE

## 2024-06-27 PROCEDURE — 85025 COMPLETE CBC W/AUTO DIFF WBC: CPT | Mod: HCNC | Performed by: INTERNAL MEDICINE

## 2024-06-27 PROCEDURE — 80053 COMPREHEN METABOLIC PANEL: CPT | Mod: HCNC | Performed by: INTERNAL MEDICINE

## 2024-07-08 ENCOUNTER — OFFICE VISIT (OUTPATIENT)
Dept: FAMILY MEDICINE | Facility: CLINIC | Age: 74
End: 2024-07-08
Payer: MEDICARE

## 2024-07-08 VITALS
SYSTOLIC BLOOD PRESSURE: 132 MMHG | HEART RATE: 47 BPM | BODY MASS INDEX: 28.88 KG/M2 | DIASTOLIC BLOOD PRESSURE: 62 MMHG | HEIGHT: 70 IN | OXYGEN SATURATION: 97 % | WEIGHT: 201.75 LBS | TEMPERATURE: 97 F

## 2024-07-08 DIAGNOSIS — E78.1 HIGH TRIGLYCERIDES: ICD-10-CM

## 2024-07-08 DIAGNOSIS — D64.9 NORMOCYTIC ANEMIA: ICD-10-CM

## 2024-07-08 DIAGNOSIS — I10 ESSENTIAL HYPERTENSION: ICD-10-CM

## 2024-07-08 DIAGNOSIS — I25.10 CORONARY ARTERY DISEASE INVOLVING NATIVE CORONARY ARTERY OF NATIVE HEART WITHOUT ANGINA PECTORIS: Chronic | ICD-10-CM

## 2024-07-08 DIAGNOSIS — M10.00 IDIOPATHIC GOUT, UNSPECIFIED CHRONICITY, UNSPECIFIED SITE: ICD-10-CM

## 2024-07-08 DIAGNOSIS — Z85.46 HISTORY OF PROSTATE CANCER: ICD-10-CM

## 2024-07-08 DIAGNOSIS — R73.03 PREDIABETES: ICD-10-CM

## 2024-07-08 DIAGNOSIS — E78.2 MIXED HYPERLIPIDEMIA: ICD-10-CM

## 2024-07-08 DIAGNOSIS — Z00.00 ROUTINE PHYSICAL EXAMINATION: Primary | ICD-10-CM

## 2024-07-08 DIAGNOSIS — N52.01 ERECTILE DYSFUNCTION DUE TO ARTERIAL INSUFFICIENCY: ICD-10-CM

## 2024-07-08 PROBLEM — D69.2 NONTHROMBOCYTOPENIC PURPURA: Status: RESOLVED | Noted: 2023-10-24 | Resolved: 2024-07-08

## 2024-07-08 PROCEDURE — 1101F PT FALLS ASSESS-DOCD LE1/YR: CPT | Mod: HCNC,CPTII,S$GLB, | Performed by: INTERNAL MEDICINE

## 2024-07-08 PROCEDURE — 3075F SYST BP GE 130 - 139MM HG: CPT | Mod: HCNC,CPTII,S$GLB, | Performed by: INTERNAL MEDICINE

## 2024-07-08 PROCEDURE — 1159F MED LIST DOCD IN RCRD: CPT | Mod: HCNC,CPTII,S$GLB, | Performed by: INTERNAL MEDICINE

## 2024-07-08 PROCEDURE — 99999 PR PBB SHADOW E&M-EST. PATIENT-LVL IV: CPT | Mod: PBBFAC,HCNC,, | Performed by: INTERNAL MEDICINE

## 2024-07-08 PROCEDURE — 99397 PER PM REEVAL EST PAT 65+ YR: CPT | Mod: HCNC,S$GLB,, | Performed by: INTERNAL MEDICINE

## 2024-07-08 PROCEDURE — 3078F DIAST BP <80 MM HG: CPT | Mod: HCNC,CPTII,S$GLB, | Performed by: INTERNAL MEDICINE

## 2024-07-08 PROCEDURE — 3288F FALL RISK ASSESSMENT DOCD: CPT | Mod: HCNC,CPTII,S$GLB, | Performed by: INTERNAL MEDICINE

## 2024-07-08 PROCEDURE — 1125F AMNT PAIN NOTED PAIN PRSNT: CPT | Mod: HCNC,CPTII,S$GLB, | Performed by: INTERNAL MEDICINE

## 2024-07-08 PROCEDURE — 1160F RVW MEDS BY RX/DR IN RCRD: CPT | Mod: HCNC,CPTII,S$GLB, | Performed by: INTERNAL MEDICINE

## 2024-07-08 PROCEDURE — 99214 OFFICE O/P EST MOD 30 MIN: CPT | Mod: HCNC,25,S$GLB, | Performed by: INTERNAL MEDICINE

## 2024-07-08 RX ORDER — SILDENAFIL 100 MG/1
100 TABLET, FILM COATED ORAL DAILY PRN
Qty: 10 TABLET | Refills: 11 | Status: SHIPPED | OUTPATIENT
Start: 2024-07-08 | End: 2025-07-08

## 2024-07-08 NOTE — PROGRESS NOTES
Subjective:       Patient ID: Alfonso Ewing is a 74 y.o. male.  Chief Complaint: Annual Exam     HPI    Here for routine health maintenance.      Gout - rare attacks.  Uric acid mildly high.  Controlled with prn colchicine      CAD - failed repeat stress test, which led to 2 more stents 5/2022.  Hx MI s/p stents x2 prior.  Dr Borrero   Bradycardia - being evaluated by cardiology.  No LOC, lightheadedness, weakness     HTN - controlled.      HLD - controlled goal < 70 LDL on Zetia (CAN) and 40 mg pravastatin.  Previous high dose statin caused muscle pain.   Transaminitis - corrected; card dec pravastatin back down to 20 mg and wants recheck in 4 mo     MAHESH/pre-renal azotemia - likely from dehydration 2nd to fasting labs.  Does not take NSAIDS     Prostate cancer s/p surgical removal 2020.  PSA undetectable      ED - controlled with Viagra.  Was tx by urology; would like me to Rx now.     preDM - stable  High triglycerides - uncontrolled  High OJ and Sweet tea    Assessment:       1. Routine physical examination    2. Essential hypertension    3. Mixed hyperlipidemia    4. Coronary artery disease involving native coronary artery of native heart without angina pectoris    5. Erectile dysfunction due to arterial insufficiency    6. High triglycerides    7. Prediabetes    8. History of prostate cancer    9. Normocytic anemia    10. Idiopathic gout, unspecified chronicity, unspecified site        Plan:       Routine physical examination    Essential hypertension  -     Comprehensive Metabolic Panel; Future; Expected date: 07/08/2024    Mixed hyperlipidemia  -     Comprehensive Metabolic Panel; Future; Expected date: 07/08/2024  -     Lipid Panel; Future; Expected date: 07/08/2024    Coronary artery disease involving native coronary artery of native heart without angina pectoris    Erectile dysfunction due to arterial insufficiency  -     sildenafiL (VIAGRA) 100 MG tablet; Take 1 tablet (100 mg total) by mouth daily as needed  for Erectile Dysfunction.  Dispense: 10 tablet; Refill: 11    High triglycerides    Prediabetes  -     Hemoglobin A1C; Future; Expected date: 07/08/2024    History of prostate cancer  -     Prostate Specific Antigen, Diagnostic; Future; Expected date: 07/08/2024    Normocytic anemia  -     CBC Auto Differential; Future; Expected date: 07/08/2024    Idiopathic gout, unspecified chronicity, unspecified site  -     Uric Acid; Future; Expected date: 07/08/2024              Wellness reviewed      Diet changes for preDM and high trig - will DC OJ and Sweet tea  Continue current management and monitor.  Other diagnoses were reviewed and found stable and will continue to monitor.  Counseled on regular exercise, maintenance of a healthy weight, balanced diet rich in fruits/vegetables and lean protein, and avoidance of unhealthy habits like smoking and excessive alcohol intake.   Also, counseled on importance of being compliant with medication, health appointments, diet and exercise.     Follow up in about 1 year (around 7/8/2025).      Medication List with Changes/Refills   Current Medications    ACETAMINOPHEN (TYLENOL) 325 MG TABLET    Take 1 tablet (325 mg total) by mouth every 6 (six) hours as needed for Pain.    AMLODIPINE (NORVASC) 5 MG TABLET    Take 1 tablet (5 mg total) by mouth every evening.    ASPIRIN (ECOTRIN) 81 MG EC TABLET    Take 81 mg by mouth every evening. HOLDING UNTIL INST BY MD    CLOPIDOGREL (PLAVIX) 75 MG TABLET    Take 1 tablet (75 mg total) by mouth once daily.    COLCHICINE (COLCRYS) 0.6 MG TABLET    TAKE 1 TABLET NOW AND 1 AT BEDTIME, THEN 1 DAILY    EZETIMIBE (ZETIA) 10 MG TABLET    Take 1 tablet (10 mg total) by mouth once daily.    IMIQUIMOD (ALDARA) 5 % CREAM    AAA nightly M-F for six weeks as directed.    NITROGLYCERIN (NITROSTAT) 0.4 MG SL TABLET    Place 1 tablet (0.4 mg total) under the tongue every 5 (five) minutes as needed for Chest pain.    PRAVASTATIN (PRAVACHOL) 20 MG TABLET    Take  1 tablet (20 mg total) by mouth every evening.    VALSARTAN-HYDROCHLOROTHIAZIDE (DIOVAN-HCT) 320-12.5 MG PER TABLET    Take 1 tablet by mouth once daily.   Changed and/or Refilled Medications    Modified Medication Previous Medication    SILDENAFIL (VIAGRA) 100 MG TABLET sildenafiL (VIAGRA) 100 MG tablet       Take 1 tablet (100 mg total) by mouth daily as needed for Erectile Dysfunction.    Take 1 tablet (100 mg total) by mouth daily as needed for Erectile Dysfunction.       BP Readings from Last 3 Encounters:   07/08/24 132/62   06/07/24 (!) 140/82   12/08/23 120/88     Hemoglobin A1C   Date Value Ref Range Status   10/19/2011 5.5 4.0 - 6.2 % Final     Lab Results   Component Value Date    TSH 2.556 02/02/2023     Lab Results   Component Value Date    LDLCALC 63.4 06/27/2024    LDLCALC 62.2 (L) 06/26/2023    LDLCALC 66.8 02/02/2023     Lab Results   Component Value Date    TRIG 228 (H) 06/27/2024    TRIG 114 06/26/2023    TRIG 106 02/02/2023     Wt Readings from Last 3 Encounters:   07/08/24 91.5 kg (201 lb 11.5 oz)   06/07/24 92.4 kg (203 lb 9.6 oz)   12/08/23 93.5 kg (206 lb 1.6 oz)     Lab Results   Component Value Date    HGB 15.2 06/27/2024    HCT 43.7 06/27/2024    WBC 7.27 06/27/2024    ALT 34 06/27/2024    AST 27 06/27/2024     06/27/2024    K 4.5 06/27/2024    CREATININE 1.6 (H) 06/27/2024    PSA 7.0 (H) 12/10/2019           Review of Systems   Constitutional:  Negative for diaphoresis and fever.   HENT:  Negative for drooling and nosebleeds.    Eyes:  Negative for discharge and redness.   Respiratory:  Negative for apnea and choking.    Cardiovascular:  Negative for chest pain and palpitations.   Gastrointestinal:  Negative for abdominal pain and nausea.   Skin:  Negative for color change.   Neurological:  Negative for seizures and syncope.   Psychiatric/Behavioral:  Negative for behavioral problems.            Objective:      Vitals:    07/08/24 0841   BP: 132/62   Pulse: (!) 47   Temp: 97.4 °F  (36.3 °C)     Physical Exam  Vitals reviewed.   Eyes:      Conjunctiva/sclera: Conjunctivae normal.   Neck:      Thyroid: No thyromegaly.      Trachea: Trachea normal.   Cardiovascular:      Rate and Rhythm: Normal rate.      Comments: Edema negative  Pulmonary:      Effort: Pulmonary effort is normal.      Breath sounds: Normal breath sounds.   Abdominal:      General: Bowel sounds are normal.      Palpations: Abdomen is soft. There is no hepatomegaly.   Musculoskeletal:      Cervical back: Normal range of motion.   Skin:     General: Skin is warm and dry.   Neurological:      Mental Status: He is alert.      Comments: DTR decreased bilateral   Psychiatric:      Comments: Alert and Oriented

## 2024-09-25 ENCOUNTER — OFFICE VISIT (OUTPATIENT)
Facility: CLINIC | Age: 74
End: 2024-09-25
Payer: MEDICARE

## 2024-09-25 DIAGNOSIS — D22.9 MULTIPLE BENIGN NEVI: Primary | ICD-10-CM

## 2024-09-25 DIAGNOSIS — Z12.83 SCREENING FOR SKIN CANCER: ICD-10-CM

## 2024-09-25 DIAGNOSIS — D48.5 NEOPLASM OF UNCERTAIN BEHAVIOR OF SKIN: ICD-10-CM

## 2024-09-25 DIAGNOSIS — L81.4 LENTIGINES: ICD-10-CM

## 2024-09-25 DIAGNOSIS — L82.1 SK (SEBORRHEIC KERATOSIS): ICD-10-CM

## 2024-09-25 DIAGNOSIS — L73.8 SEBACEOUS HYPERPLASIA: ICD-10-CM

## 2024-09-25 DIAGNOSIS — L57.0 AK (ACTINIC KERATOSIS): ICD-10-CM

## 2024-09-25 DIAGNOSIS — D18.01 CHERRY ANGIOMA: ICD-10-CM

## 2024-09-25 DIAGNOSIS — Z85.828 HISTORY OF NONMELANOMA SKIN CANCER: ICD-10-CM

## 2024-09-25 PROCEDURE — 1101F PT FALLS ASSESS-DOCD LE1/YR: CPT | Mod: HCNC,CPTII,S$GLB, | Performed by: DERMATOLOGY

## 2024-09-25 PROCEDURE — 99999 PR PBB SHADOW E&M-EST. PATIENT-LVL III: CPT | Mod: PBBFAC,HCNC,, | Performed by: DERMATOLOGY

## 2024-09-25 PROCEDURE — 99213 OFFICE O/P EST LOW 20 MIN: CPT | Mod: 25,HCNC,S$GLB, | Performed by: DERMATOLOGY

## 2024-09-25 PROCEDURE — 88305 TISSUE EXAM BY PATHOLOGIST: CPT | Mod: 26,HCNC,, | Performed by: DERMATOLOGY

## 2024-09-25 PROCEDURE — 88305 TISSUE EXAM BY PATHOLOGIST: CPT | Mod: 59,HCNC | Performed by: DERMATOLOGY

## 2024-09-25 PROCEDURE — 11102 TANGNTL BX SKIN SINGLE LES: CPT | Mod: HCNC,XS,S$GLB, | Performed by: DERMATOLOGY

## 2024-09-25 PROCEDURE — 1159F MED LIST DOCD IN RCRD: CPT | Mod: HCNC,CPTII,S$GLB, | Performed by: DERMATOLOGY

## 2024-09-25 PROCEDURE — 11103 TANGNTL BX SKIN EA SEP/ADDL: CPT | Mod: HCNC,S$GLB,, | Performed by: DERMATOLOGY

## 2024-09-25 PROCEDURE — 17004 DESTROY PREMAL LESIONS 15/>: CPT | Mod: HCNC,S$GLB,, | Performed by: DERMATOLOGY

## 2024-09-25 PROCEDURE — 3288F FALL RISK ASSESSMENT DOCD: CPT | Mod: HCNC,CPTII,S$GLB, | Performed by: DERMATOLOGY

## 2024-09-25 NOTE — PROGRESS NOTES
Subjective:      Patient ID:  Alfonso Ewing is a 74 y.o. male who presents for   Chief Complaint   Patient presents with    Follow-up     Recheck spots on the arms     HPI    Established patient.  3 month f/u post Aldara to nasal tip, bx proven sBCC involv deep margin, repeat punch scar / no BCC - see below. Performed Aldara to nasal tip qhs M-F x 6 weeks.   C/o few spots at forearms that did not response to LN2 at LV.    +NMSC  sBCC at nasal tip s/p shave bx 5/2024, subsequent Aldara course   BCC at L elbow s/p EDC 6/2024 6/2024  Skin, nasal tip, punch re-biopsy:  -SCAR (POST-SURGICAL)  -NEGATIVE FOR RESIDUAL BASAL CELL CARCINOMA    5/2024  1. Skin, nasal tip, shave biopsy:  -BASAL CELL CARCINOMA, SUPERFICIAL TYPE, FOCALLY EXTENDING TO THE DEEP BIOPSY EDGES, see comment  COMMENT:  This lesion has the pattern of a superficial basal cell carcinoma. However, due to the superficial nature of the biopsy, the presence of a deeper basal cell carcinoma cannot be excluded completely.  This lesion is skin cancer. You will be contacted regarding treatment.  2. Skin, right forearm, shave biopsy:  -HYPERTROPHIC ACTINIC KERATOSIS, INVOLVING HAIR FOLLICLES AND EXTENDING TO THE BASE OF THE BIOPSY  3. Skin, right hand, shave biopsy:  -HYPERTROPHIC ACTINIC KERATOSIS, INVOLVING HAIR FOLLICLES AND EXTENDING TO THE BASE OF THE BIOPSY  4. Skin, left forearm, shave biopsy:  -HYPERTROPHIC ACTINIC KERATOSIS  5. Skin, left elbow, shave biopsy:  -BASAL CELL CARCINOMA, SUPERFICIAL TYPE, EXTENDING TO A PERIPHERAL BIOPSY EDGE     Review of Systems    Objective:   Physical Exam   Constitutional: He appears well-developed and well-nourished. He is cooperative. No distress.   HENT:   Head: Normocephalic and atraumatic.   Eyes: Lids are normal. Lids are normal.  Right conjunctiva is not injected. Left conjunctiva is not injected. No conjunctival no injection.   Pulmonary/Chest: Effort normal. No respiratory distress.   Neurological: He is  alert and oriented to person, place, and time. He is not disoriented.   Psychiatric: He has a normal mood and affect. His speech is normal and behavior is normal. Mood, memory, affect and thought content normal.   Skin:   Areas Examined (abnormalities noted in diagram):   Scalp / Hair Palpated and Inspected  Head / Face Inspection Performed  Neck Inspection Performed  Chest / Axilla Inspection Performed  Abdomen Inspection Performed  Back Inspection Performed  RUE Inspected  LUE Inspection Performed  Nails and Digits Inspection Performed                 Diagram Legend     Erythematous scaling macule/papule c/w actinic keratosis       Vascular papule c/w angioma      Pigmented verrucoid papule/plaque c/w seborrheic keratosis      Yellow umbilicated papule c/w sebaceous hyperplasia      Irregularly shaped tan macule c/w lentigo     1-2 mm smooth white papules consistent with Milia      Movable subcutaneous cyst with punctum c/w epidermal inclusion cyst      Subcutaneous movable cyst c/w pilar cyst      Firm pink to brown papule c/w dermatofibroma      Pedunculated fleshy papule(s) c/w skin tag(s)      Evenly pigmented macule c/w junctional nevus     Mildly variegated pigmented, slightly irregular-bordered macule c/w mildly atypical nevus      Flesh colored to evenly pigmented papule c/w intradermal nevus       Pink pearly papule/plaque c/w basal cell carcinoma      Erythematous hyperkeratotic cursted plaque c/w SCC      Surgical scar with no sign of skin cancer recurrence      Open and closed comedones      Inflammatory papules and pustules      Verrucoid papule consistent consistent with wart     Erythematous eczematous patches and plaques     Dystrophic onycholytic nail with subungual debris c/w onychomycosis     Umbilicated papule    Erythematous-base heme-crusted tan verrucoid plaque consistent with inflamed seborrheic keratosis     Erythematous Silvery Scaling Plaque c/w Psoriasis     See  annotation                  Assessment / Plan:      Pathology Orders:       Normal Orders This Visit    Specimen to Pathology, Dermatology     Comments:    Number of Specimens:->3  ------------------------->-------------------------  Spec 1 Procedure:->Biopsy  Spec 1 Clinical Impression:->r/o superficial nmsc  Spec 1 Source:->right inner forearm  ------------------------->-------------------------  Spec 2 Procedure:->Biopsy  Spec 2 Clinical Impression:->r/o superficial nmsc  Spec 2 Source:->left proximal forearm  ------------------------->-------------------------  Spec 3 Procedure:->Biopsy  Spec 3 Clinical Impression:->r/o scc  Spec 3 Source:->left distal forearm  Release to patient->Immediate    Questions:    Procedure Type: Dermatology and skin neoplasms    Number of Specimens: 3    ------------------------: -------------------------    Spec 1 Procedure: Biopsy    Spec 1 Clinical Impression: r/o superficial nmsc    Spec 1 Source: right inner forearm    ------------------------: -------------------------    Spec 2 Procedure: Biopsy    Spec 2 Clinical Impression: r/o superficial nmsc    Spec 2 Source: left proximal forearm    ------------------------: -------------------------    Spec 3 Procedure: Biopsy    Spec 3 Clinical Impression: r/o scc    Spec 3 Source: left distal forearm    Release to patient: Immediate            Neoplasm of uncertain behavior of skin  -     Specimen to Pathology, Dermatology  - Discussed diagnosis with patient and explained uncertain nature of condition, including ddx.   - Discussed treatment options (biopsy, close monitoring) with patient, including the risks and benefits of each. Patient opted to pursue biopsy.  - Shave Biopsy Procedure Note: Discussed procedure with patient/patient's guardian including risks and benefits as well as treatment alternatives. Risks of procedure include pain, bleeding, infection, post-inflammatory pigmentary alteration, scar, recurrence. Patient informed  that the purpose of a biopsy is sampling of condition in question rather than removal in entirety; further treatment may be necessary. Verbal consent obtained. Area to be biopsied marked and cleansed with alcohol. Local anesthesia achieved by injecting approximately 1 cc of 1% lidocaine with epinephrine. Three shave biopsies performed using a double edge razor blade; specimens submitted to pathology. Hemostasis achieved with aluminum chloride. Petroleum jelly and bandage applied to wounds. Patient tolerated procedures well. After-visit wound care instructions reviewed and provided in writing.     AK (actinic keratosis)  - Discussed diagnosis, etiology, and precancerous nature of condition.   - Cryosurgery Procedure Note: Discussed procedure with patient/patient's guardian including risks and benefits as well as treatment alternatives. Risks of procedure include pain, itching, swelling, redness, blistering, crusting, wound formation, post-inflammatory pigmentary alteration, scar, recurrence. Verbal consent obtained. LN2 cryosurgery performed to 15+ lesion(s). Patient tolerated procedure well. After-visit wound care instructions reviewed and provided in writing.      Acrochordon  Cherry angioma  Sebaceous hyperplasia  - Benign; reassured treatment not necessary.      Hx NMSC  Screening for skin cancer  - Upper body skin examination performed today.  - Findings listed above.   - Sites of prior malignancy examined - no concern for recurrence today.    - Recommended routine self examination of skin.    - Recommended daily sun protection, including the use of OTC broad-spectrum sunscreen (SPF 30 or greater) and sun-protective clothing.             Follow up for pending pathology.

## 2024-10-01 LAB
FINAL PATHOLOGIC DIAGNOSIS: NORMAL
GROSS: NORMAL
Lab: NORMAL
MICROSCOPIC EXAM: NORMAL

## 2024-11-06 ENCOUNTER — OFFICE VISIT (OUTPATIENT)
Dept: FAMILY MEDICINE | Facility: CLINIC | Age: 74
End: 2024-11-06
Payer: MEDICARE

## 2024-11-06 VITALS
HEIGHT: 70 IN | WEIGHT: 205.94 LBS | OXYGEN SATURATION: 96 % | SYSTOLIC BLOOD PRESSURE: 136 MMHG | HEART RATE: 40 BPM | BODY MASS INDEX: 29.48 KG/M2 | DIASTOLIC BLOOD PRESSURE: 72 MMHG

## 2024-11-06 DIAGNOSIS — I25.10 CORONARY ARTERY DISEASE INVOLVING NATIVE CORONARY ARTERY OF NATIVE HEART WITHOUT ANGINA PECTORIS: Chronic | ICD-10-CM

## 2024-11-06 DIAGNOSIS — D12.3 ADENOMATOUS POLYP OF TRANSVERSE COLON: ICD-10-CM

## 2024-11-06 DIAGNOSIS — I65.23 BILATERAL CAROTID ARTERY STENOSIS: ICD-10-CM

## 2024-11-06 DIAGNOSIS — I10 ESSENTIAL HYPERTENSION: Chronic | ICD-10-CM

## 2024-11-06 DIAGNOSIS — Z00.00 ENCOUNTER FOR MEDICARE ANNUAL WELLNESS EXAM: Primary | ICD-10-CM

## 2024-11-06 DIAGNOSIS — E78.5 DYSLIPIDEMIA: ICD-10-CM

## 2024-11-06 DIAGNOSIS — Z12.12 SCREENING FOR COLORECTAL CANCER: ICD-10-CM

## 2024-11-06 DIAGNOSIS — I70.0 AORTIC ATHEROSCLEROSIS: ICD-10-CM

## 2024-11-06 DIAGNOSIS — Z12.11 SCREENING FOR COLORECTAL CANCER: ICD-10-CM

## 2024-11-06 DIAGNOSIS — N18.30 STAGE 3 CHRONIC KIDNEY DISEASE, UNSPECIFIED WHETHER STAGE 3A OR 3B CKD: ICD-10-CM

## 2024-11-06 DIAGNOSIS — Z23 NEED FOR INFLUENZA VACCINATION: ICD-10-CM

## 2024-11-06 PROCEDURE — 99999 PR PBB SHADOW E&M-EST. PATIENT-LVL IV: CPT | Mod: PBBFAC,HCNC,, | Performed by: NURSE PRACTITIONER

## 2024-11-06 NOTE — PATIENT INSTRUCTIONS
Counseling and Referral of Other Preventative  (Italic type indicates deductible and co-insurance are waived)    Patient Name: Alfonso Ewing  Today's Date: 11/6/2024    Health Maintenance       Date Due Completion Date    COVID-19 Vaccine (3 - Pfizer risk series) 04/15/2022 3/18/2022    RSV Vaccine (Age 60+ and Pregnant patients) (1 - 1-dose 75+ series) Never done ---    Aspirin/Antiplatelet Therapy 09/25/2025 9/25/2024    TETANUS VACCINE 07/13/2028 7/13/2018    Lipid Panel 06/27/2029 6/27/2024        No orders of the defined types were placed in this encounter.      The following information is provided to all patients.  This information is to help you find resources for any of the problems found today that may be affecting your health:                  Living healthy guide: www.[facility].[place].gov      Understanding Diabetes: www.diabetes.org      Eating healthy: www.cdc.gov/healthyweight      [facility] home safety checklist: www.cdc.gov/steadi/patient.html      Agency on Aging: www.goea.[place].[facility]      Alcoholics anonymous (AA): www.aa.org      Physical Activity: www.kathya.nih.gov/uz1dzqo      Tobacco use: www.quitwithusla.org

## 2024-11-13 ENCOUNTER — OFFICE VISIT (OUTPATIENT)
Facility: CLINIC | Age: 74
End: 2024-11-13
Payer: MEDICARE

## 2024-11-13 DIAGNOSIS — D04.62 SQUAMOUS CELL CARCINOMA IN SITU (SCCIS) OF SKIN OF LEFT FOREARM: ICD-10-CM

## 2024-11-13 DIAGNOSIS — D04.61 SQUAMOUS CELL CARCINOMA IN SITU (SCCIS) OF SKIN OF RIGHT FOREARM: ICD-10-CM

## 2024-11-13 DIAGNOSIS — L57.8 ACTINIC SKIN DAMAGE: Primary | ICD-10-CM

## 2024-11-13 PROCEDURE — 99999 PR PBB SHADOW E&M-EST. PATIENT-LVL II: CPT | Mod: PBBFAC,HCNC,, | Performed by: DERMATOLOGY

## 2024-11-13 PROCEDURE — 1101F PT FALLS ASSESS-DOCD LE1/YR: CPT | Mod: HCNC,CPTII,S$GLB, | Performed by: DERMATOLOGY

## 2024-11-13 PROCEDURE — 3288F FALL RISK ASSESSMENT DOCD: CPT | Mod: HCNC,CPTII,S$GLB, | Performed by: DERMATOLOGY

## 2024-11-13 PROCEDURE — 1159F MED LIST DOCD IN RCRD: CPT | Mod: HCNC,CPTII,S$GLB, | Performed by: DERMATOLOGY

## 2024-11-13 PROCEDURE — 17262 DSTRJ MAL LES T/A/L 1.1-2.0: CPT | Mod: HCNC,S$GLB,, | Performed by: DERMATOLOGY

## 2024-11-13 PROCEDURE — 99214 OFFICE O/P EST MOD 30 MIN: CPT | Mod: 25,HCNC,S$GLB, | Performed by: DERMATOLOGY

## 2024-11-13 RX ORDER — FLUOROURACIL 50 MG/G
CREAM TOPICAL
Qty: 40 G | Refills: 1 | Status: SHIPPED | OUTPATIENT
Start: 2024-11-13

## 2024-11-13 NOTE — PATIENT INSTRUCTIONS
After wounds heal, apply thin layer of fluorouracil cream 2x daily to backs of hands, forearms x 2-4 weeks.       Field Treatment for Actinic Keratoses (precancerous lesions)    5-Fluorouracil - This is a topical chemotherapy for your skin. This cream should never be applied without discussing with you dermatologist.    This treatment gets your immune system involved in fighting precancers (actinic keratoses), even those we can't yet see.     HOW TO APPLY: Apply a fingertip length amount to the entire area 2x/day for 2-4 weeks. Wash your hands carefully after applying the cream and wipe glasses, BIPAP/CPAP machines, or anything else that comes in frequent contact with the cream.    WHAT TO EXPECT: Your skin will likely become red, crusted, sore, and tender during the treatment usually starting around day 3 and continuing for about 1 week after last application. Your skin may take up to 6 weeks to return to its normal coloring (lose the pink).     HOW TO HEAL FAST: To speed healing, wash with a gentle wash and apply Vaseline jelly especially to crusted open areas.    WE CAN HELP: Please contact us for any questions or problem shooting the application of these creams: [phone] or [email or web address]    IT WILL BE WORTH IT!!!

## 2024-11-14 NOTE — PROGRESS NOTES
"  Alfonso Ewing presented for a follow-up Medicare AWV today. The following components were reviewed and updated:    Medical history  Family History  Social history  Allergies and Current Medications  Health Risk Assessment  Health Maintenance  Care Team    **See Completed Assessments for Annual Wellness visit with in the encounter summary    The following assessments were completed:  Depression Screening  Cognitive function Screening    Timed Get Up Test  Whisper Test      Opioid documentation:      Patient does not have a current opioid prescription.          Vitals:    11/06/24 1245   BP: 136/72   BP Location: Left arm   Patient Position: Sitting   Pulse: (!) 40   SpO2: 96%   Weight: 93.4 kg (205 lb 14.6 oz)   Height: 5' 10" (1.778 m)     Body mass index is 29.54 kg/m².       Physical Exam  Vitals reviewed.   Constitutional:       General: He is not in acute distress.  HENT:      Head: Normocephalic.   Cardiovascular:      Rate and Rhythm: Bradycardia present.   Skin:     General: Skin is warm.   Neurological:      General: No focal deficit present.      Mental Status: He is alert.   Psychiatric:         Mood and Affect: Mood normal.           CHIEF COMPLAINT:  Patient presents for a annual medicare wellness visit to discuss his overall health status, including recent dermatology treatments and kidney function.    HPI:  Patient has a history of skin cancer with ongoing treatment from Dr. Stanton, dermatologist. He recently had a lesion removed from his nose, initially attributed to mask irritation. 3 spots were previously removed, with 3 more scheduled for treatment next week - 2 for cryotherapy and 1 for excision.    Patient's activity level has decreased due to fall risk concerns and his wife's health. He stopped gardening due to skin cancer concerns but maintains activity by walking on the porch or in the yard. Both the patient and his wife exercise caution to avoid falls.    Patient has occasional gout " episodes, which can be severe if not promptly treated. He identifies dietary triggers and keeps gout medication available for symptom onset.    Kidney function is a concern. His GFR dropped below 60 in February 2023, indicating stage 3 kidney disease. As of June this year, his GFR was 44.4, relatively stable since June 2023. Patient reports low fluid intake, typically consuming 1 bottle of water daily along with orange juice and iced tea.    Patient uses hearing aids and reports longstanding hearing problems since his 20s, possibly related to untreated childhood ear infections and past operation of heavy machinery.    Patient denies chest pain, shortness of breath, or weakness. He also denies having a pacemaker or any joint replacements.    MEDICATIONS:  Patient is on Nitroglycerine as needed for chest pain. He is also taking colchicine when symptoms occur.    MEDICAL HISTORY:  Patient has a history of gout, skin cancer, stage 3 chronic kidney disease, and hearing loss. He received a flu vaccine today    TEST RESULTS:  Patient's kidney function (eGFR) was 57.6 in February 2023, 44.7 in June 2023, and 44.4 in June 2024. During the current visit, the patient passed a memory test, recalling 2 out of 3 words.    SOCIAL HISTORY:  Patient is retired and previously operated heavy machinery. Patient has been  since 1967.      ROS:  General: -fever, -chills, -fatigue, -weight gain, -weight loss  Eyes: -vision changes, -redness, -discharge  ENT: -ear pain, -nasal congestion, -sore throat, +hearing loss  Cardiovascular: -chest pain, -palpitations, -lower extremity edema  Respiratory: -cough, -shortness of breath  Gastrointestinal: -abdominal pain, -nausea, -vomiting, -diarrhea, -constipation, -blood in stool  Genitourinary: -dysuria, -hematuria, -frequency  Musculoskeletal: +joint pain, -muscle pain  Skin: -rash, +lesion  Neurological: -headache, -dizziness, -numbness, -tingling, -weakness  Psychiatric: -anxiety,  -depression, -sleep difficulty        Assessment & Plan    Noted stage 3 chronic kidney disease with eGFR 44.4, stable since June 2023  Reviewed PSA screening guidelines    Diagnoses and health risks identified today and associated recommendations/orders:  1. Encounter for Medicare annual wellness exam  Flu vaccine today.  Recommend RSV vaccine    2. Aortic atherosclerosis  CXR 7/27/20    3. Bilateral carotid artery stenosis  Carotid US 11/7/22    4. Stage 3 chronic kidney disease, unspecified whether stage 3a or 3b CKD    5. Coronary artery disease involving native coronary artery of native heart without angina pectoris    6. Dyslipidemia    7. Essential hypertension    8. Need for influenza vaccination  - influenza (adjuvanted) (Fluad) 45 mcg/0.5 mL IM vaccine (> or = 66 yo) 0.5 mL    9. Screening for colorectal cancer        Explained stages of chronic kidney disease and importance of prevention of progression.  Discussed how uncontrolled diabetes and hypertension can damage kidneys over time.  Clarified that urination patterns are not reliable indicators of kidney function.  Advised to use acetaminophen instead of NSAIDs (e.g., ibuprofen, naproxen) for pain relief.  Recommend increasing water intake to at least 2 bottles per day, with a goal of 3 bottles.  Emphasized maintaining good blood pressure control.  Patient to increase daily water intake to at least 2 bottles (16-20 oz each), aiming for 3 bottles.  Patient to consume water earlier in the day to avoid nighttime bathroom trips.    Noted that the patient experiences gout symptoms and recognizes when an attack is imminent.  Confirmed that the patient has gout medication to be taken as needed when symptoms begin.    Confirmed that the patient wears hearing aids to manage bilateral sensorineural hearing loss.    Instructed the patient to continue current blood pressure management regimen.  Documented that current blood pressure is reported as  good.    Continued nitroglycerine as needed for chest pain.  Documented patient's history of heart disease and ongoing monitoring by Dr Borrero  Prescribed nitroglycerin for chest pain and advised the patient to keep it accessible at all times.  Issued a new prescription for nitroglycerin.  Documented patient's history of coronary stents.    Taking zetia and pravastatin which has been effective     Noted patient's history of skin cancer with ongoing identification and treatment of new spots.  Confirmed that the patient is under the care of Dr. Stanton, a dermatologist, for ongoing treatment of skin cancer lesions.    Colonoscopy ordered.  GI will call to schedule colonoscopy.          Provided Alfonso with a 5-10 year written screening schedule and personal prevention plan. Recommendations were developed using the USPSTF age appropriate recommendations. Education, counseling, and referrals were provided as needed.  After Visit Summary printed and given to patient which includes a list of additional screenings\tests needed.    No follow-ups on file.      Kiara Joseph NP

## 2024-11-14 NOTE — PROGRESS NOTES
1. Skin,  Right inner forearm,  shave biopsy:    - SQUAMOUS CELL CARCINOMA IN SITU    - The tumor involves the peripheral tissue edge, and focally involves the deep tissue edge.   2. Skin,  Left proximal forearm,  shave biopsy:    - SQUAMOUS CELL CARCINOMA IN SITU    - The tumor involves the peripheral tissue edge.  The deep tissue edge is uninvolved by tumor.   3. 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   - Discussed treatment options with patient, including the risks and benefits of each. Patient opted for EDC.   - Electrodessication and Curettage Procedure Note: Discussed procedure with patient/patient's guardian including risks and benefits as well as treatment alternatives. Risks of procedure include pain, bleeding, infection, post-inflammatory pigmentary alteration, scar, recurrence. Verbal consent obtained. Area to be treated marked and cleansed with alcohol. Local anesthesia achieved by injection of 1% lidocaine with epinephrine. Curettage and desiccation x 3 performed. Lesion size after primary curettage: 1.5 cm. Petroleum jelly and bandage applied to wound. Patient tolerated procedure well. After-visit wound care instructions reviewed and provided in writing. F/u 3 months, PRN sooner.      Squamous cell carcinoma in situ (SCCIS) of skin of left forearm x 2   - Discussed malignant diagnosis with patient and need for definitive treatment.   - Discussed treatment options with patient, including the risks and benefits of each. Patient opted for EDC.   - Electrodessication and Curettage Procedure Note: Discussed procedure with patient/patient's guardian including risks and benefits as well as treatment alternatives. Risks of procedure include pain, bleeding, infection,  post-inflammatory pigmentary alteration, scar, recurrence. Verbal consent obtained. Area to be treated marked and cleansed with alcohol. Local anesthesia achieved by injection of 1% lidocaine with epinephrine. Curettage and desiccation x 3 performed. Lesion size after primary curettage: 1.5 cm. Petroleum jelly and bandage applied to wound. Patient tolerated procedure well. After-visit wound care instructions reviewed and provided in writing. F/u 3 months, PRN sooner.      Actinic skin damage  -     fluorouraciL (EFUDEX) 5 % cream; AAA back of hands, forearms bid x 2-4 weeks  Dispense: 40 g; Refill: 1  After significant healing from above EDC x 3 (in about 2-3 weeks), patient to perform Efudex field therapy to hands, forearms.   - Counseled on potential SE of medication(s) and instructed on use.     3 month f/u

## 2024-11-24 NOTE — PROGRESS NOTES
Subjective:       Patient ID: Alfonso Ewing is a 67 y.o. male.    Chief Complaint: Knee Injury (appeared Thurs- right knee- random brusing- can not put pressure on leg)    Patient who is new to me presents with c/o knee pain. Patient has tried ice and heat with little relief in pain. Patient was hauling some hogs on to a truck on Thursday and noticed the next day some bruising to his right knee. Patient denies falling or known trauma.       Knee Injury   This is a new problem. Episode onset: Patient was hauling hogs. The problem occurs constantly. The problem has been gradually worsening. Associated symptoms include arthralgias, joint swelling and myalgias. Pertinent negatives include no abdominal pain, anorexia, change in bowel habit, chest pain, chills, congestion, coughing, diaphoresis, fatigue, fever, headaches, nausea, neck pain, numbness, rash, sore throat, swollen glands or urinary symptoms. The symptoms are aggravated by standing.     Review of Systems   Constitutional: Negative for chills, diaphoresis, fatigue and fever.   HENT: Negative for congestion and sore throat.    Respiratory: Negative for cough.    Cardiovascular: Negative for chest pain.   Gastrointestinal: Negative for abdominal pain, anorexia, change in bowel habit and nausea.   Musculoskeletal: Positive for arthralgias, gait problem, joint swelling and myalgias. Negative for neck pain.   Skin: Negative for rash.   Neurological: Negative for numbness and headaches.       Objective:      Physical Exam   Constitutional: He is oriented to person, place, and time. He appears well-developed and well-nourished.   HENT:   Head: Normocephalic and atraumatic.   Right Ear: External ear normal.   Left Ear: External ear normal.   Eyes: Conjunctivae and EOM are normal. Pupils are equal, round, and reactive to light.   Neck: Normal range of motion. Neck supple.   Cardiovascular: Normal rate and regular rhythm.    Pulmonary/Chest: Effort normal and breath  sounds normal.   Abdominal: Soft. Bowel sounds are normal.   Musculoskeletal:        Right knee: He exhibits decreased range of motion, swelling, effusion, ecchymosis and bony tenderness. He exhibits normal alignment, no LCL laxity, normal meniscus and no MCL laxity.   Neurological: He is alert and oriented to person, place, and time.   Skin: Skin is warm and dry.   Nursing note and vitals reviewed.      Assessment:       1. Acute pain of right knee    2. Effusion of right knee    3. Bruising        Plan:       Acute pain of right knee  -     Cancel: X-ray knee right AP lateral and axial; Future; Expected date: 10/31/2017  -     Ambulatory referral to Orthopedics    Effusion of right knee  -     Ambulatory referral to Orthopedics    Bruising  -     Ambulatory referral to Orthopedics    Patient with right knee effusion and acute soft injury/internal derangement of the right knee. According to the xray may require an MRI of the knee, patient will be seen by ortho on 11/2. Patient instructed to take tylenol and ice/heat for pain. Patient instructed on RICE therapy. Patient instructed to return to clinic for any new or concerning symptoms.      5 (moderate pain)

## 2024-12-11 ENCOUNTER — TELEPHONE (OUTPATIENT)
Dept: GASTROENTEROLOGY | Facility: CLINIC | Age: 74
End: 2024-12-11
Payer: MEDICARE

## 2024-12-24 ENCOUNTER — LAB VISIT (OUTPATIENT)
Dept: LAB | Facility: HOSPITAL | Age: 74
End: 2024-12-24
Attending: INTERNAL MEDICINE
Payer: MEDICARE

## 2024-12-24 DIAGNOSIS — Z95.5 STATUS POST CORONARY ARTERY STENT PLACEMENT: Chronic | ICD-10-CM

## 2024-12-24 DIAGNOSIS — E78.5 DYSLIPIDEMIA: ICD-10-CM

## 2024-12-24 DIAGNOSIS — I25.10 CORONARY ARTERY DISEASE INVOLVING NATIVE CORONARY ARTERY OF NATIVE HEART WITHOUT ANGINA PECTORIS: Chronic | ICD-10-CM

## 2024-12-24 DIAGNOSIS — R00.1 BRADYCARDIA: Chronic | ICD-10-CM

## 2024-12-24 DIAGNOSIS — I10 ESSENTIAL HYPERTENSION: Chronic | ICD-10-CM

## 2024-12-24 DIAGNOSIS — I65.23 BILATERAL CAROTID ARTERY STENOSIS: ICD-10-CM

## 2024-12-24 DIAGNOSIS — I70.0 AORTIC ATHEROSCLEROSIS: ICD-10-CM

## 2024-12-24 DIAGNOSIS — N18.31 STAGE 3A CHRONIC KIDNEY DISEASE: ICD-10-CM

## 2024-12-24 LAB
ALBUMIN SERPL BCP-MCNC: 3.9 G/DL (ref 3.5–5.2)
ALP SERPL-CCNC: 38 U/L (ref 40–150)
ALT SERPL W/O P-5'-P-CCNC: 33 U/L (ref 10–44)
ANION GAP SERPL CALC-SCNC: 10 MMOL/L (ref 8–16)
AST SERPL-CCNC: 25 U/L (ref 10–40)
BASOPHILS # BLD AUTO: 0.03 K/UL (ref 0–0.2)
BASOPHILS NFR BLD: 0.5 % (ref 0–1.9)
BILIRUB SERPL-MCNC: 0.7 MG/DL (ref 0.1–1)
BUN SERPL-MCNC: 21 MG/DL (ref 8–23)
CALCIUM SERPL-MCNC: 9.6 MG/DL (ref 8.7–10.5)
CHLORIDE SERPL-SCNC: 104 MMOL/L (ref 95–110)
CHOLEST SERPL-MCNC: 143 MG/DL (ref 120–199)
CHOLEST/HDLC SERPL: 4.2 {RATIO} (ref 2–5)
CO2 SERPL-SCNC: 26 MMOL/L (ref 23–29)
CREAT SERPL-MCNC: 1.3 MG/DL (ref 0.5–1.4)
DIFFERENTIAL METHOD BLD: ABNORMAL
EOSINOPHIL # BLD AUTO: 0.1 K/UL (ref 0–0.5)
EOSINOPHIL NFR BLD: 2.4 % (ref 0–8)
ERYTHROCYTE [DISTWIDTH] IN BLOOD BY AUTOMATED COUNT: 12.8 % (ref 11.5–14.5)
EST. GFR  (NO RACE VARIABLE): 56.9 ML/MIN/1.73 M^2
GLUCOSE SERPL-MCNC: 116 MG/DL (ref 70–110)
HCT VFR BLD AUTO: 44.7 % (ref 40–54)
HDLC SERPL-MCNC: 34 MG/DL (ref 40–75)
HDLC SERPL: 23.8 % (ref 20–50)
HGB BLD-MCNC: 15.4 G/DL (ref 14–18)
IMM GRANULOCYTES # BLD AUTO: 0.02 K/UL (ref 0–0.04)
IMM GRANULOCYTES NFR BLD AUTO: 0.4 % (ref 0–0.5)
LDLC SERPL CALC-MCNC: 83.6 MG/DL (ref 63–159)
LYMPHOCYTES # BLD AUTO: 1.5 K/UL (ref 1–4.8)
LYMPHOCYTES NFR BLD: 27.3 % (ref 18–48)
MCH RBC QN AUTO: 32.1 PG (ref 27–31)
MCHC RBC AUTO-ENTMCNC: 34.5 G/DL (ref 32–36)
MCV RBC AUTO: 93 FL (ref 82–98)
MONOCYTES # BLD AUTO: 0.6 K/UL (ref 0.3–1)
MONOCYTES NFR BLD: 10.1 % (ref 4–15)
NEUTROPHILS # BLD AUTO: 3.3 K/UL (ref 1.8–7.7)
NEUTROPHILS NFR BLD: 59.3 % (ref 38–73)
NONHDLC SERPL-MCNC: 109 MG/DL
NRBC BLD-RTO: 0 /100 WBC
PLATELET # BLD AUTO: 207 K/UL (ref 150–450)
PMV BLD AUTO: 12.2 FL (ref 9.2–12.9)
POTASSIUM SERPL-SCNC: 4.8 MMOL/L (ref 3.5–5.1)
PROT SERPL-MCNC: 7 G/DL (ref 6–8.4)
RBC # BLD AUTO: 4.8 M/UL (ref 4.6–6.2)
SODIUM SERPL-SCNC: 140 MMOL/L (ref 136–145)
TRIGL SERPL-MCNC: 127 MG/DL (ref 30–150)
WBC # BLD AUTO: 5.53 K/UL (ref 3.9–12.7)

## 2024-12-24 PROCEDURE — 80061 LIPID PANEL: CPT | Mod: HCNC | Performed by: INTERNAL MEDICINE

## 2024-12-24 PROCEDURE — 80053 COMPREHEN METABOLIC PANEL: CPT | Mod: HCNC | Performed by: INTERNAL MEDICINE

## 2024-12-24 PROCEDURE — 36415 COLL VENOUS BLD VENIPUNCTURE: CPT | Mod: HCNC,PO | Performed by: INTERNAL MEDICINE

## 2024-12-24 PROCEDURE — 85025 COMPLETE CBC W/AUTO DIFF WBC: CPT | Mod: HCNC | Performed by: INTERNAL MEDICINE

## 2024-12-30 ENCOUNTER — TELEPHONE (OUTPATIENT)
Dept: GASTROENTEROLOGY | Facility: CLINIC | Age: 74
End: 2024-12-30
Payer: MEDICARE

## 2024-12-30 NOTE — TELEPHONE ENCOUNTER
----- Message from Frank sent at 12/30/2024 10:11 AM CST -----  Regarding: appt  Type:  Sooner Apoointment Request      Name of Caller:pt    When is the first available appointment?dept booked     Symptoms:colonoscopy       Best Call Back Number:[phone]      Additional Information: pt is looking to get schedule.  please call to discuss.

## 2025-01-08 ENCOUNTER — TELEPHONE (OUTPATIENT)
Dept: GASTROENTEROLOGY | Facility: CLINIC | Age: 75
End: 2025-01-08
Payer: MEDICARE

## 2025-01-15 ENCOUNTER — OFFICE VISIT (OUTPATIENT)
Facility: CLINIC | Age: 75
End: 2025-01-15
Payer: MEDICARE

## 2025-01-15 DIAGNOSIS — Z85.828 HISTORY OF NONMELANOMA SKIN CANCER: ICD-10-CM

## 2025-01-15 DIAGNOSIS — L57.8 ACTINIC SKIN DAMAGE: ICD-10-CM

## 2025-01-15 DIAGNOSIS — L27.0 DERMATITIS MEDICAMENTOSA: Primary | ICD-10-CM

## 2025-01-15 PROCEDURE — G2211 COMPLEX E/M VISIT ADD ON: HCPCS | Mod: HCNC,S$GLB,, | Performed by: DERMATOLOGY

## 2025-01-15 PROCEDURE — 1101F PT FALLS ASSESS-DOCD LE1/YR: CPT | Mod: HCNC,CPTII,S$GLB, | Performed by: DERMATOLOGY

## 2025-01-15 PROCEDURE — 3288F FALL RISK ASSESSMENT DOCD: CPT | Mod: HCNC,CPTII,S$GLB, | Performed by: DERMATOLOGY

## 2025-01-15 PROCEDURE — 99214 OFFICE O/P EST MOD 30 MIN: CPT | Mod: HCNC,S$GLB,, | Performed by: DERMATOLOGY

## 2025-01-15 PROCEDURE — 99999 PR PBB SHADOW E&M-EST. PATIENT-LVL II: CPT | Mod: PBBFAC,HCNC,, | Performed by: DERMATOLOGY

## 2025-01-15 PROCEDURE — 1159F MED LIST DOCD IN RCRD: CPT | Mod: HCNC,CPTII,S$GLB, | Performed by: DERMATOLOGY

## 2025-01-15 NOTE — PROGRESS NOTES
Subjective:      Patient ID:  Alfonso Ewing is a 74 y.o. male who presents for   Chief Complaint   Patient presents with    Follow-up     Efudex f/u      HPI    Established patient.  3 month f/u SCC x 3 at [facility] s/p EDC 11/2024.   Also instructed to perform efudex field therapy to [facility]. Has been using x 2 weeks.     11/2024  1. Skin,  Right inner forearm,  shave biopsy:    - SQUAMOUS CELL CARCINOMA IN SITU    - The tumor involves the peripheral tissue edge, and focally involves the deep tissue edge.   2. Skin,  Left proximal forearm,  shave biopsy:    - SQUAMOUS CELL CARCINOMA IN SITU    - The tumor involves the peripheral tissue edge.  The deep tissue edge is uninvolved by tumor.   3. Skin, left distal forearm,  shave biopsy:    - SQUAMOUS CELL CARCINOMA IN SITU AND ADJACENT HYPERTROPHIC LICHENOID ACTINIC KERATOSIS    +NMSC  SCCIS at L distal forearm s/p EDC 11/2024  SCCIS at L proximal forearm s/p EDC 11/2024  SCCIS at R inner forearm s/p EDC 11/2024  sBCC at nasal tip s/p shave bx 5/2024, subsequent Aldara course   BCC at L elbow s/p EDC 6/2024 6/2024  Skin, nasal tip, punch re-biopsy:  -SCAR (POST-SURGICAL)  -NEGATIVE FOR RESIDUAL BASAL CELL CARCINOMA    5/2024  1. Skin, nasal tip, shave biopsy:  -BASAL CELL CARCINOMA, SUPERFICIAL TYPE, FOCALLY EXTENDING TO THE DEEP BIOPSY EDGES, see comment  COMMENT:  This lesion has the pattern of a superficial basal cell carcinoma. However, due to the superficial nature of the biopsy, the presence of a deeper basal cell carcinoma cannot be excluded completely.  This lesion is skin cancer. You will be contacted regarding treatment.  2. Skin, right forearm, shave biopsy:  -HYPERTROPHIC ACTINIC KERATOSIS, INVOLVING HAIR FOLLICLES AND EXTENDING TO THE BASE OF THE BIOPSY  3. Skin, right hand, shave biopsy:  -HYPERTROPHIC ACTINIC KERATOSIS, INVOLVING HAIR FOLLICLES AND EXTENDING TO THE BASE OF THE BIOPSY  4. Skin, left forearm, shave biopsy:  -HYPERTROPHIC ACTINIC KERATOSIS  5.  Skin, left elbow, shave biopsy:  -BASAL CELL CARCINOMA, SUPERFICIAL TYPE, EXTENDING TO A PERIPHERAL BIOPSY EDGE     Review of Systems    Objective:   Physical Exam   Constitutional: He appears well-developed and well-nourished. He is cooperative. No distress.   HENT:   Head: Normocephalic and atraumatic.   Eyes: Lids are normal. Lids are normal.  Right conjunctiva is not injected. Left conjunctiva is not injected. No conjunctival no injection.   Pulmonary/Chest: Effort normal. No respiratory distress.   Neurological: He is alert and oriented to person, place, and time. He is not disoriented.   Psychiatric: He has a normal mood and affect. His speech is normal and behavior is normal. Mood, memory, affect and thought content normal.   Skin:   Areas Examined (abnormalities noted in diagram):   RUE Inspected  LUE Inspection Performed            Diagram Legend     Erythematous scaling macule/papule c/w actinic keratosis       Vascular papule c/w angioma      Pigmented verrucoid papule/plaque c/w seborrheic keratosis      Yellow umbilicated papule c/w sebaceous hyperplasia      Irregularly shaped tan macule c/w lentigo     1-2 mm smooth white papules consistent with Milia      Movable subcutaneous cyst with punctum c/w epidermal inclusion cyst      Subcutaneous movable cyst c/w pilar cyst      Firm pink to brown papule c/w dermatofibroma      Pedunculated fleshy papule(s) c/w skin tag(s)      Evenly pigmented macule c/w junctional nevus     Mildly variegated pigmented, slightly irregular-bordered macule c/w mildly atypical nevus      Flesh colored to evenly pigmented papule c/w intradermal nevus       Pink pearly papule/plaque c/w basal cell carcinoma      Erythematous hyperkeratotic cursted plaque c/w SCC      Surgical scar with no sign of skin cancer recurrence      Open and closed comedones      Inflammatory papules and pustules      Verrucoid papule consistent consistent with wart     Erythematous eczematous patches  and plaques     Dystrophic onycholytic nail with subungual debris c/w onychomycosis     Umbilicated papule    Erythematous-base heme-crusted tan verrucoid plaque consistent with inflamed seborrheic keratosis     Erythematous Silvery Scaling Plaque c/w Psoriasis     See annotation    Assessment / Plan:        Dermatitis medicamentosa  Actinic skin damage  Mild, recommend continuing Efudex field therapy to [facility] for an addition 2-4 weeks as tolerating.   - Counseled on potential SE of medication(s) and instructed on use.     History of nonmelanoma skin cancer  - Sites of prior malignancy examined - no concern for recurrence today.             Follow up in about 3 months (around 4/15/2025) for skin check, sooner PRN.

## 2025-01-16 ENCOUNTER — TELEPHONE (OUTPATIENT)
Dept: GASTROENTEROLOGY | Facility: CLINIC | Age: 75
End: 2025-01-16
Payer: MEDICARE

## 2025-01-16 NOTE — TELEPHONE ENCOUNTER
Spoke to pts wife in regards to scheduling scope. Wife states you never called or returned my previous call, no one has reached out about scheduling. Advised wife that we had left a message, attempted to return her call and a [employer] message was sent that she read. Wife stated I didn't know what I was talking about and preceded to ask if we call from an '[facility] # or a 1-800 #'. I advised her an [facility] #. Pt scheduled for scope per wife request. Prep instructions sent to pts [employer] and placed in mail. Wife verbalized understanding

## 2025-01-16 NOTE — TELEPHONE ENCOUNTER
----- Message from Ananya sent at 1/16/2025  3:33 PM CST -----  Regarding: Appt  Type:  Needs Medical Advice    Who Called: Pt Wife    Would the patient rather a call back or a response via [facility]? Call    Best Call Back Number: [phone]      Additional Information: Wife needs an appt for pt for colonoscopy. Thanks

## 2025-01-20 NOTE — TELEPHONE ENCOUNTER
Juliano Borrero MD Fraley, Stephanie, MA3 days ago       Unless patient has had a coronary PCI within the last 6 months that is not known to me/available in epic it is ok  to hold plavix (Effient, Brilinta)/ASA for 5-7 days preprocedure, resume ASAP post procedure.  Understands there is less than 1% chance of coronary thrombosis.

## 2025-02-24 DIAGNOSIS — Z00.00 ENCOUNTER FOR MEDICARE ANNUAL WELLNESS EXAM: ICD-10-CM

## 2025-03-20 ENCOUNTER — TELEPHONE (OUTPATIENT)
Dept: GASTROENTEROLOGY | Facility: CLINIC | Age: 75
End: 2025-03-20
Payer: MEDICARE

## 2025-03-20 NOTE — TELEPHONE ENCOUNTER
----- Message from Mame sent at 3/20/2025 10:44 AM CDT -----  Type:  Needs Medical AdviceWho Called: filiberto[t wife Pharmacy name and phone #: [id number] - [facility] [address] AT [place] [address] & [id number] [place] [id number]: [phone] Fax: [phone] the patient rather a call back or a response via [facility]? Call back Best Call Back Number: [phone] Additional Information: i have wife of pt  calling regarding script for prep for colonoscopy    Please call back to advise. Thank you.

## 2025-03-25 ENCOUNTER — ANESTHESIA EVENT (OUTPATIENT)
Dept: ENDOSCOPY | Facility: HOSPITAL | Age: 75
End: 2025-03-25
Payer: MEDICARE

## 2025-03-25 ENCOUNTER — ANESTHESIA (OUTPATIENT)
Dept: ENDOSCOPY | Facility: HOSPITAL | Age: 75
End: 2025-03-25
Payer: MEDICARE

## 2025-03-25 ENCOUNTER — HOSPITAL ENCOUNTER (OUTPATIENT)
Facility: HOSPITAL | Age: 75
Discharge: HOME | End: 2025-03-25
Attending: INTERNAL MEDICINE | Admitting: INTERNAL MEDICINE
Payer: MEDICARE

## 2025-03-25 DIAGNOSIS — Z86.0100 HX OF COLONIC POLYPS: ICD-10-CM

## 2025-03-25 PROCEDURE — 63600175 PHARM REV CODE 636 W HCPCS: Mod: HCNC,PO | Performed by: NURSE ANESTHETIST, CERTIFIED REGISTERED

## 2025-03-25 PROCEDURE — G0105 COLORECTAL SCRN; HI RISK IND: HCPCS | Mod: HCNC,PO | Performed by: INTERNAL MEDICINE

## 2025-03-25 PROCEDURE — 37000008 HC ANESTHESIA 1ST 15 MINUTES: Mod: HCNC,PO | Performed by: INTERNAL MEDICINE

## 2025-03-25 PROCEDURE — 37000009 HC ANESTHESIA EA ADD 15 MINS: Mod: HCNC,PO | Performed by: INTERNAL MEDICINE

## 2025-03-25 PROCEDURE — G0105 COLORECTAL SCRN; HI RISK IND: HCPCS | Mod: HCNC,,, | Performed by: INTERNAL MEDICINE

## 2025-03-25 PROCEDURE — 63600175 PHARM REV CODE 636 W HCPCS: Mod: HCNC,PO | Performed by: INTERNAL MEDICINE

## 2025-03-25 RX ORDER — PROPOFOL 10 MG/ML
VIAL (ML) INTRAVENOUS
Status: DISCONTINUED | OUTPATIENT
Start: 2025-03-25 | End: 2025-03-25

## 2025-03-25 RX ORDER — SODIUM CHLORIDE 0.9 % (FLUSH) 0.9 %
10 SYRINGE (ML) INJECTION
Status: DISCONTINUED | OUTPATIENT
Start: 2025-03-25 | End: 2025-03-25 | Stop reason: HOSPADM

## 2025-03-25 RX ORDER — LIDOCAINE HYDROCHLORIDE 20 MG/ML
INJECTION INTRAVENOUS
Status: DISCONTINUED | OUTPATIENT
Start: 2025-03-25 | End: 2025-03-25

## 2025-03-25 RX ORDER — SODIUM CHLORIDE, SODIUM LACTATE, POTASSIUM CHLORIDE, CALCIUM CHLORIDE 600; 310; 30; 20 MG/100ML; MG/100ML; MG/100ML; MG/100ML
INJECTION, SOLUTION INTRAVENOUS CONTINUOUS
Status: DISCONTINUED | OUTPATIENT
Start: 2025-03-25 | End: 2025-03-25 | Stop reason: HOSPADM

## 2025-03-25 RX ADMIN — PROPOFOL 40 MG: 10 INJECTION, EMULSION INTRAVENOUS at 08:03

## 2025-03-25 RX ADMIN — PROPOFOL 100 MG: 10 INJECTION, EMULSION INTRAVENOUS at 08:03

## 2025-03-25 RX ADMIN — SODIUM CHLORIDE, POTASSIUM CHLORIDE, SODIUM LACTATE AND CALCIUM CHLORIDE: 600; 310; 30; 20 INJECTION, SOLUTION INTRAVENOUS at 07:03

## 2025-03-25 RX ADMIN — LIDOCAINE HYDROCHLORIDE 50 MG: 20 INJECTION INTRAVENOUS at 08:03

## 2025-03-25 NOTE — H&P
History & Physical - Short Stay  Gastroenterology      SUBJECTIVE:     Procedure: Colonoscopy    Chief Complaint/Indication for Procedure: Previous Polyps    PTA Medications   Medication Sig    amLODIPine (NORVASC) 5 MG tablet Take 1 tablet (5 mg total) by mouth every evening.    clopidogreL (PLAVIX) 75 mg tablet Take 1 tablet (75 mg total) by mouth once daily.    valsartan-hydrochlorothiazide (DIOVAN-HCT) 320-12.5 mg per tablet Take 1 tablet by mouth once daily.    acetaminophen (TYLENOL) 325 MG tablet Take 1 tablet (325 mg total) by mouth every 6 (six) hours as needed for Pain.    aspirin (ECOTRIN) 81 MG EC tablet Take 81 mg by mouth every evening. HOLDING UNTIL INST BY MD    colchicine (COLCRYS) 0.6 mg tablet TAKE 1 TABLET NOW AND 1 AT BEDTIME, THEN 1 DAILY    ezetimibe (ZETIA) 10 mg tablet Take 1 tablet (10 mg total) by mouth once daily.    fluorouraciL (EFUDEX) 5 % cream AAA back of hands, forearms bid x 2-4 weeks    imiquimod (ALDARA) 5 % cream AAA nightly M-F for six weeks as directed.    nitroGLYCERIN (NITROSTAT) 0.4 MG SL tablet Place 1 tablet (0.4 mg total) under the tongue every 5 (five) minutes as needed for Chest pain.    pravastatin (PRAVACHOL) 20 MG tablet Take 1 tablet (20 mg total) by mouth every evening.    sildenafiL (VIAGRA) 100 MG tablet Take 1 tablet (100 mg total) by mouth daily as needed for Erectile Dysfunction.       Review of patient's allergies indicates:   Allergen Reactions    Niacin Rash    Promethazine Nausea Only    Statins-hmg-coa reductase inhibitors Other (See Comments)     Other reaction(s): Muscle pain        Past Medical History:   Diagnosis Date    Allergy     Anticoagulant long-term use     ASA 81 mg, Plavix    BPH (benign prostatic hyperplasia)     BPH (benign prostatic hypertrophy)     Bradycardia     chronic since 2012    Bradycardia 06/21/2012    CAD (coronary artery disease) 02/13/2012 07/11/2011 mid LAD, 80% stenosis; mid LCX, 60% stenosis; mid RCA, 80%        stenosis;                                                                   11/14/2000 LAD, luminal irregularities; LCX, luminal irregularities;        RCA, occluded;                                                                  Cancer     prostate    Cataract     ou done//    CHF (congestive heart failure)     Coronary artery disease     2 stents    Dyslipidemia 02/13/2012    hx increased LFT while on niaspan an  vytorin. Myalgia with lipitor, zocor.     Dyspnea on exertion     stable, chronic    Essential hypertension 02/13/2012    Gout     HEARING LOSS     SEVERE -  No hearing aids anymore    History of myocardial infarction 2000    Hypertension     Nephrolithiasis 1980    passed it on his own    Otitis media     Sleep apnea     use cpap    Status post coronary artery stent placement 06/21/2012                                        Previous PCI:                                                               S/P coated stent to LAD, 07/29/2011                                         S/P coated stent to RCA, 07/29/2011                                         S/P stent to RCA, 11/14/2000           Past Surgical History:   Procedure Laterality Date    CARDIAC CATHETERIZATION      2 stents    CATARACT EXTRACTION W/  INTRAOCULAR LENS IMPLANT Right 11/24/2015    By Dr Byrd    CATARACT EXTRACTION W/  INTRAOCULAR LENS IMPLANT Left 1/12/2016    [facility]    COLONOSCOPY      COLONOSCOPY N/A 1/31/2019    Procedure: COLONOSCOPY;  Surgeon: Evan Basurto MD;  Location: [facility] ENDO;  Service: Endoscopy;  Laterality: N/A;    CORONARY STENT PLACEMENT      x 2    CORONARY STENT PLACEMENT N/A 5/9/2022    Procedure: NEREYDA to the LCX and RCA;  Surgeon: Juliano Borrero MD;  Location: [facility] CATH;  Service: Cardiology;  Laterality: N/A;    LEFT HEART CATHETERIZATION Left 5/9/2022    Procedure: Left heart cath;  Surgeon: Juliano Borrero MD;  Location: [facility] CATH;  Service: Cardiology;  Laterality: Left;    ROBOT-ASSISTED LAPAROSCOPIC  PROSTATECTOMY USING DA VIVIEN XI N/A 7/31/2020    Procedure: XI ROBOTIC PROSTATECTOMY;  Surgeon: Ranjit Mckeon MD;  Location: [facility] OR;  Service: Urology;  Laterality: N/A;    ROBOT-ASSISTED LAPAROSCOPIC RETROPERITONEAL LYMPHADENECTOMY USING DA VIVIEN XI N/A 7/31/2020    Procedure: XI ROBOTIC LYMPHADENECTOMY, RETROPERITONEUM;  Surgeon: Ranjit Mckeon MD;  Location: [facility] OR;  Service: Urology;  Laterality: N/A;    TRANSRECTAL BIOPSY OF PROSTATE WITH ULTRASOUND GUIDANCE Bilateral 2/10/2020    Procedure: BIOPSY, PROSTATE, RECTAL APPROACH, WITH US GUIDANCE;  Surgeon: Tavares Thomas MD;  Location: [facility] OR;  Service: Urology;  Laterality: Bilateral;     Family History   Problem Relation Name Age of Onset    Stroke Mother      Kidney disease Mother      Glaucoma Mother      Heart failure Father      Heart disease Father      Hypertension Father      Hyperlipidemia Father      Glaucoma Brother      Diabetes Brother      Heart disease Brother      Hyperlipidemia Brother      Prostate cancer Brother      Cancer Sister      Hypertension Son Alfonso     Heart disease Brother      Diabetes Brother      Hyperlipidemia Brother      Heart disease Brother      Hyperlipidemia Brother      Heart disease Brother Alistair     Hyperlipidemia Brother Alistair     Hypertension Brother Alistair     Hypertension Brother Aba     Hyperlipidemia Brother Aba     Heart disease Sister      Hypertension Sister      Hyperlipidemia Sister      Heart disease Sister      Hypertension Sister      Hyperlipidemia Sister      Heart disease Sister      Hypertension Sister      Hyperlipidemia Sister      Prostate cancer Sister      Amblyopia Neg Hx      Blindness Neg Hx      Cataracts Neg Hx      Macular degeneration Neg Hx      Retinal detachment Neg Hx      Strabismus Neg Hx      Thyroid disease Neg Hx       Social History[1]      OBJECTIVE:     Vital Signs (Most Recent)  Temp: 97.8 °F (36.6 °C) (03/25/25 0726)  Pulse: (!) 51 (03/25/25 0726)  Resp:  16 (03/25/25 0726)  BP: (!) 195/86 (03/25/25 0726)  SpO2: 97 % (03/25/25 0726)    Physical Exam:                                                       GENERAL:  Comfortable, in no acute distress.                                 HEENT EXAM:  Nonicteric.  No adenopathy.  Oropharynx is clear.               NECK:  Supple.                                                               LUNGS:  Clear.                                                               CARDIAC:  Regular rate and rhythm.  S1, S2.  No murmur.                      ABDOMEN:  Soft, positive bowel sounds, nontender.  No hepatosplenomegaly or masses.  No rebound or guarding.                                             EXTREMITIES:  No edema.     MENTAL STATUS:  Normal, alert and oriented.      ASSESSMENT/PLAN:     Assessment: Previous Polyps    Plan: Colonoscopy    Anesthesia Plan: General    ASA Grade: ASA 2 - Patient with mild systemic disease with no functional limitations    MALLAMPATI SCORE:  I (soft palate, uvula, fauces, and tonsillar pillars visible)           [1]   Social History  Tobacco Use    Smoking status: Never    Smokeless tobacco: Former     Types: Snuff     Quit date: 11/24/2005    Tobacco comments:     dipped tobacco   Substance Use Topics    Alcohol use: No    Drug use: No

## 2025-03-25 NOTE — TRANSFER OF CARE
"Anesthesia Transfer of Care Note    Patient: Alfonso Ewing    Procedure(s) Performed: Procedure(s) (LRB):  COLONOSCOPY, SCREENING, HIGH RISK PATIENT (N/A)    Patient location: PACU    Anesthesia Type: general    Transport from OR: Transported from OR on room air with adequate spontaneous ventilation    Post pain: adequate analgesia    Post assessment: no apparent anesthetic complications    Post vital signs: stable    Level of consciousness: awake and sedated    Nausea/Vomiting: no nausea/vomiting    Complications: none    Transfer of care protocol was followed      Last vitals: Visit Vitals  BP (!) 195/86 (BP Location: Left arm, Patient Position: Lying)   Pulse (!) 51   Temp 36.6 °C (97.8 °F) (Skin)   Resp 16   Ht 5' 10" (1.778 m)   Wt 95.7 kg (211 lb)   SpO2 97%   BMI 30.28 kg/m²     "

## 2025-03-25 NOTE — DISCHARGE SUMMARY
[facility] - Endoscopy  Discharge Note  Short Stay  Discharge Note  Short Stay      SUMMARY     Admit Date: 3/25/2025    Attending Physician: Evan Basurto MD     Discharge Physician: Evan Basurto MD    Discharge Date: 3/25/2025 9:02 AM    Final Diagnosis: History of colon polyps [Z86.0100]    Disposition: HOME OR SELF CARE    Patient Instructions:   Current Discharge Medication List        CONTINUE these medications which have NOT CHANGED    Details   amLODIPine (NORVASC) 5 MG tablet Take 1 tablet (5 mg total) by mouth every evening.  Qty: 90 tablet, Refills: 4    Comments: .  Associated Diagnoses: Essential hypertension; Status post coronary artery stent placement; Dyslipidemia; Bradycardia      clopidogreL (PLAVIX) 75 mg tablet Take 1 tablet (75 mg total) by mouth once daily.  Qty: 90 tablet, Refills: 4    Associated Diagnoses: Essential hypertension; Status post coronary artery stent placement; Dyslipidemia; Bradycardia      valsartan-hydrochlorothiazide (DIOVAN-HCT) 320-12.5 mg per tablet Take 1 tablet by mouth once daily.  Qty: 90 tablet, Refills: 3    Comments: .  Associated Diagnoses: Essential hypertension      acetaminophen (TYLENOL) 325 MG tablet Take 1 tablet (325 mg total) by mouth every 6 (six) hours as needed for Pain.      aspirin (ECOTRIN) 81 MG EC tablet Take 81 mg by mouth every evening. HOLDING UNTIL INST BY MD      colchicine (COLCRYS) 0.6 mg tablet TAKE 1 TABLET NOW AND 1 AT BEDTIME, THEN 1 DAILY  Qty: 90 tablet, Refills: 4    Comments: **Patient requests 90 days supply**  Associated Diagnoses: Pain in right toe(s)      ezetimibe (ZETIA) 10 mg tablet Take 1 tablet (10 mg total) by mouth once daily.  Qty: 90 tablet, Refills: 4    Associated Diagnoses: Essential hypertension; Status post coronary artery stent placement; Dyslipidemia; Bradycardia      fluorouraciL (EFUDEX) 5 % cream AAA back of hands, forearms bid x 2-4 weeks  Qty: 40 g, Refills: 1    Associated Diagnoses: Actinic  skin damage      imiquimod (ALDARA) 5 % cream AAA nightly M-F for six weeks as directed.  Qty: 48 packet, Refills: 1    Associated Diagnoses: Superficial basal cell carcinoma      nitroGLYCERIN (NITROSTAT) 0.4 MG SL tablet Place 1 tablet (0.4 mg total) under the tongue every 5 (five) minutes as needed for Chest pain.  Qty: 25 tablet, Refills: 4      pravastatin (PRAVACHOL) 20 MG tablet Take 1 tablet (20 mg total) by mouth every evening.  Qty: 90 tablet, Refills: 4    Associated Diagnoses: Essential hypertension; Status post coronary artery stent placement; Dyslipidemia; Bradycardia      sildenafiL (VIAGRA) 100 MG tablet Take 1 tablet (100 mg total) by mouth daily as needed for Erectile Dysfunction.  Qty: 10 tablet, Refills: 11    Associated Diagnoses: Erectile dysfunction due to arterial insufficiency             Discharge Procedure Orders (must include Diet, Follow-up, Activity)    Follow Up:  Follow up with PCP as previously scheduled  Resume routine diet.  Activity as tolerated.    No driving day of procedure.

## 2025-03-25 NOTE — ANESTHESIA PREPROCEDURE EVALUATION
03/25/2025  Alfonso Ewing is a 74 y.o., male.      Pre-op Assessment          Review of Systems  Cardiovascular:     Hypertension   CAD       CHF                Shortness of Breath    Coronary Artery Disease:               Congestive Heart Failure (CHF)                Hypertension         Pulmonary:      Shortness of breath  Sleep Apnea     Obstructive Sleep Apnea (NIC).           Renal/:  Chronic Renal Disease        Kidney Function/Disease                 Physical Exam  General: Well nourished        Anesthesia Plan  Type of Anesthesia, risks & benefits discussed:    Anesthesia Type: Gen Natural Airway  Intra-op Monitoring Plan: Standard ASA Monitors  Induction:  IV  Informed Consent: Informed consent signed with the Patient and all parties understand the risks and agree with anesthesia plan.  All questions answered.   ASA Score: 3  Day of Surgery Review of History & Physical: H&P Update referred to the surgeon/provider.    Ready For Surgery From Anesthesia Perspective.     .

## 2025-03-25 NOTE — PROVATION PATIENT INSTRUCTIONS
Discharge Summary/Instructions after an Endoscopic Procedure  Patient Name: Alfonso Ewing  Patient MRN: [id number]  Patient YOB: 1950 Tuesday, March 25, 2025  Evan Basurto MD  Dear patient,  As a result of recent federal legislation (The Federal Cures Act), you may   receive lab or pathology results from your procedure in your [facility]   account before your physician is able to contact you. Your physician or   their representative will relay the results to you with their   recommendations at their soonest availability.  Thank you,  RESTRICTIONS:  During your procedure today, you received medications for sedation.  These   medications may affect your judgment, balance and coordination.  Therefore,   for 24 hours, you have the following restrictions:   - DO NOT drive a car, operate machinery, make legal/financial decisions,   sign important papers or drink alcohol.    ACTIVITY:  Today: no heavy lifting, straining or running due to procedural   sedation/anesthesia.  The following day: return to full activity including work.  DIET:  Eat and drink normally unless instructed otherwise.     TREATMENT FOR COMMON SIDE EFFECTS:  - Mild abdominal pain, nausea, belching, bloating or excessive gas:  rest,   eat lightly and use a heating pad.  - Sore Throat: treat with throat lozenges and/or gargle with warm salt   water.  - Because air was used during the procedure, expelling large amounts of air   from your rectum or belching is normal.  - If a bowel prep was taken, you may not have a bowel movement for 1-3 days.    This is normal.  SYMPTOMS TO WATCH FOR AND REPORT TO YOUR PHYSICIAN:  1. Abdominal pain or bloating, other than gas cramps.  2. Chest pain.  3. Back pain.  4. Signs of infection such as: chills or fever occurring within 24 hours   after the procedure.  5. Rectal bleeding, which would show as bright red, maroon, or black stools.   (A tablespoon of blood from the rectum is not serious, especially if    hemorrhoids are present.)  6. Vomiting.  7. Weakness or dizziness.  GO DIRECTLY TO THE NEAREST EMERGENCY ROOM IF YOU HAVE ANY OF THE FOLLOWING:      Difficulty breathing              Chills and/or fever over 101 F   Persistent vomiting and/or vomiting blood   Severe abdominal pain   Severe chest pain   Black, tarry stools   Bleeding- more than one tablespoon   Any other symptom or condition that you feel may need urgent attention  Your doctor recommends these additional instructions:  If any biopsies were taken, your doctors clinic will contact you in 1 to 2   weeks with any results.  Your physician has indicated that a repeat colonoscopy is not recommended   for surveillance.   You are being discharged to home.  For questions, problems or results please call your physician - Evan Basurto MD at Work:  [phone].  EMERGENCY PHONE NUMBER: [phone], LAB RESULTS: [phone]  IF A COMPLICATION OR EMERGENCY SITUATION ARISES AND YOU ARE UNABLE TO REACH   YOUR PHYSICIAN - GO DIRECTLY TO THE EMERGENCY ROOM.  ___________________________________________  Nurse Signature  ___________________________________________  Patient/Designated Responsible Party Signature  Evan Basurto MD  3/25/2025 9:01:53 AM  This report has been verified and signed electronically.  Dear patient,  As a result of recent federal legislation (The Federal Cures Act), you may   receive lab or pathology results from your procedure in your [facility]   account before your physician is able to contact you. Your physician or   their representative will relay the results to you with their   recommendations at their soonest availability.  Thank you.  PROVATION

## 2025-03-26 VITALS
RESPIRATION RATE: 16 BRPM | SYSTOLIC BLOOD PRESSURE: 118 MMHG | BODY MASS INDEX: 30.21 KG/M2 | OXYGEN SATURATION: 98 % | HEART RATE: 52 BPM | HEIGHT: 70 IN | DIASTOLIC BLOOD PRESSURE: 68 MMHG | WEIGHT: 211 LBS | TEMPERATURE: 98 F

## 2025-03-26 NOTE — ANESTHESIA POSTPROCEDURE EVALUATION
Anesthesia Post Evaluation    Patient: Alfonso Ewing    Procedure(s) Performed: Procedure(s) (LRB):  COLONOSCOPY, SCREENING, HIGH RISK PATIENT (N/A)    Final Anesthesia Type: general      Patient location during evaluation: PACU  Patient participation: Yes- Able to Participate  Level of consciousness: awake and alert  Post-procedure vital signs: reviewed and stable  Pain management: adequate  Airway patency: patent    PONV status at discharge: No PONV  Anesthetic complications: no      Cardiovascular status: blood pressure returned to baseline  Respiratory status: unassisted and room air  Hydration status: euvolemic  Follow-up not needed.              Vitals Value Taken Time   /68 03/25/25 09:33   Temp 36.6 °C (97.8 °F) 03/25/25 09:04   Pulse 52 03/25/25 09:33   Resp 16 03/25/25 09:33   SpO2 98 % 03/25/25 09:18         Event Time   Out of Recovery 09:43:09         Pain/Kofi Score: Kofi Score: 10 (3/25/2025  9:34 AM)

## 2025-03-31 ENCOUNTER — TELEPHONE (OUTPATIENT)
Dept: FAMILY MEDICINE | Facility: CLINIC | Age: 75
End: 2025-03-31

## 2025-03-31 ENCOUNTER — HOSPITAL ENCOUNTER (OUTPATIENT)
Dept: RADIOLOGY | Facility: HOSPITAL | Age: 75
Discharge: HOME | End: 2025-03-31
Attending: INTERNAL MEDICINE
Payer: MEDICARE

## 2025-03-31 ENCOUNTER — OFFICE VISIT (OUTPATIENT)
Dept: FAMILY MEDICINE | Facility: CLINIC | Age: 75
End: 2025-03-31
Payer: MEDICARE

## 2025-03-31 ENCOUNTER — RESULTS FOLLOW-UP (OUTPATIENT)
Dept: FAMILY MEDICINE | Facility: CLINIC | Age: 75
End: 2025-03-31

## 2025-03-31 VITALS
TEMPERATURE: 98 F | DIASTOLIC BLOOD PRESSURE: 78 MMHG | BODY MASS INDEX: 30.21 KG/M2 | SYSTOLIC BLOOD PRESSURE: 132 MMHG | HEIGHT: 70 IN | HEART RATE: 88 BPM | OXYGEN SATURATION: 97 % | WEIGHT: 211 LBS
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
## 2025-03-31 DIAGNOSIS — I10 ESSENTIAL HYPERTENSION: ICD-10-CM

## 2025-03-31 DIAGNOSIS — M79.603 PAIN OF UPPER EXTREMITY, UNSPECIFIED LATERALITY: ICD-10-CM

## 2025-03-31 DIAGNOSIS — M25.561 CHRONIC PAIN OF RIGHT KNEE: ICD-10-CM

## 2025-03-31 DIAGNOSIS — G89.29 CHRONIC PAIN OF RIGHT KNEE: ICD-10-CM

## 2025-03-31 DIAGNOSIS — N18.31 STAGE 3A CHRONIC KIDNEY DISEASE: ICD-10-CM
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
## 2025-03-31 PROCEDURE — 99214 OFFICE O/P EST MOD 30 MIN: CPT | Mod: HCNC,S$GLB,, | Performed by: INTERNAL MEDICINE

## 2025-03-31 PROCEDURE — 3078F DIAST BP <80 MM HG: CPT | Mod: HCNC,CPTII,S$GLB, | Performed by: INTERNAL MEDICINE

## 2025-03-31 PROCEDURE — 99999 PR PBB SHADOW E&M-EST. PATIENT-LVL IV: CPT | Mod: PBBFAC,HCNC,, | Performed by: INTERNAL MEDICINE

## 2025-03-31 PROCEDURE — 1160F RVW MEDS BY RX/DR IN RCRD: CPT | Mod: HCNC,CPTII,S$GLB, | Performed by: INTERNAL MEDICINE

## 2025-03-31 PROCEDURE — 73080 X-RAY EXAM OF ELBOW: CPT | Mod: TC,HCNC,FY,PO,LT

## 2025-03-31 PROCEDURE — 1159F MED LIST DOCD IN RCRD: CPT | Mod: HCNC,CPTII,S$GLB, | Performed by: INTERNAL MEDICINE

## 2025-03-31 PROCEDURE — 73080 X-RAY EXAM OF ELBOW: CPT | Mod: 26,HCNC,LT, | Performed by: RADIOLOGY

## 2025-03-31 PROCEDURE — G2211 COMPLEX E/M VISIT ADD ON: HCPCS | Mod: HCNC,S$GLB,, | Performed by: INTERNAL MEDICINE

## 2025-03-31 PROCEDURE — 73564 X-RAY EXAM KNEE 4 OR MORE: CPT | Mod: 26,50,HCNC, | Performed by: RADIOLOGY

## 2025-03-31 PROCEDURE — 3075F SYST BP GE 130 - 139MM HG: CPT | Mod: HCNC,CPTII,S$GLB, | Performed by: INTERNAL MEDICINE

## 2025-03-31 PROCEDURE — 1126F AMNT PAIN NOTED NONE PRSNT: CPT | Mod: HCNC,CPTII,S$GLB, | Performed by: INTERNAL MEDICINE

## 2025-03-31 PROCEDURE — 73564 X-RAY EXAM KNEE 4 OR MORE: CPT | Mod: TC,50,HCNC,FY,PO

## 2025-03-31 RX ORDER — MUPIROCIN 20 MG/G
OINTMENT TOPICAL 3 TIMES DAILY
Qty: 15 G | Refills: 0 | Status: SHIPPED | OUTPATIENT
Start: 2025-03-31 | End: 2025-04-10

## 2025-03-31 NOTE — TELEPHONE ENCOUNTER
----- Message from Adrian Raza MD sent at 3/31/2025  2:32 PM CDT -----  Please contact the patient and let them know that he does not have a fracture of his elbow.    His knee shows arthritis.   ----- Message -----  From: Interface, Rad Results In  Sent: 3/31/2025   1:07 PM CDT  To: Adrian Raza MD

## 2025-03-31 NOTE — PROGRESS NOTES
"Subjective:       Patient ID: Alfonso Ewing is a 74 y.o. male.  Chief Complaint: Arm Pain     HPI    Patient tripped over a hose and ran into a fence post 4 days ago.   His arms broke his fall into the post.  Left elbow was swollen and bruised.  Pain has improved to mild since.  Able to use his arm    C/o right knee chronic pain with occasional swelling.  He feels that leg has become weaker from the knee pain and contributed to the above fall.  Sometimes it "gives out."       Gout - rare attacks.  Uric acid mildly high.  Controlled with prn colchicine      CAD - failed repeat stress test, which led to 2 more stents 5/2022.  Hx MI s/p stents x2 prior.  Dr Borrero   Bradycardia - being evaluated by cardiology.  No LOC, lightheadedness, weakness     HTN - controlled.      HLD - controlled goal < 70 LDL on Zetia (CAN) and 40 mg pravastatin.  Previous high dose statin caused muscle pain.   Transaminitis - corrected; card dec pravastatin back down to 20 mg and wants recheck in 4 mo     MAHESH/pre-renal azotemia - likely from dehydration 2nd to fasting labs.  Does not take NSAIDS     Prostate cancer s/p surgical removal 2020.  PSA undetectable       ED - controlled with Viagra.  Was tx by urology; would like me to Rx now.      preDM - stable  High triglycerides - uncontrolled  High OJ and Sweet tea    Assessment:       1. Fall, initial encounter    2. Pain of upper extremity, unspecified laterality    3. Essential hypertension    4. Stage 3a chronic kidney disease    5. Chronic pain of right knee        Plan:       Fall, initial encounter  -     X-Ray Elbow Complete Left; Future; Expected date: 03/31/2025    Pain of upper extremity, unspecified laterality  -     mupirocin (BACTROBAN) 2 % ointment; Apply topically 3 (three) times daily. for 10 days  Dispense: 15 g; Refill: 0  -     X-Ray Elbow Complete Left; Future; Expected date: 03/31/2025    Essential hypertension    Stage 3a chronic kidney disease  -     X-ray Knee Ortho " Bilateral with Flexion; Future; Expected date: 03/31/2025    Chronic pain of right knee  -     Ambulatory referral/consult to Orthopedics; Future; Expected date: 04/07/2025            Keep covered and clean.    R/o fracture  Send to ortho for knee injection and evaluation    Visit today included increased complexity associated with the care of the episodic problem HTN addressed and managing the longitudinal care of the patient due to the serious and/or complex managed problem(s) HTN.  Continue current management and monitor.  Other diagnoses were reviewed and found stable and will continue to monitor.  Counseled on regular exercise, maintenance of a healthy weight, balanced diet rich in fruits/vegetables and lean protein, and avoidance of unhealthy habits like smoking and excessive alcohol intake.   Also, counseled on importance of being compliant with medication, health appointments, diet and exercise.     No follow-ups on file.      Medication List with Changes/Refills   New Medications    MUPIROCIN (BACTROBAN) 2 % OINTMENT    Apply topically 3 (three) times daily. for 10 days   Current Medications    ACETAMINOPHEN (TYLENOL) 325 MG TABLET    Take 1 tablet (325 mg total) by mouth every 6 (six) hours as needed for Pain.    AMLODIPINE (NORVASC) 5 MG TABLET    Take 1 tablet (5 mg total) by mouth every evening.    ASPIRIN (ECOTRIN) 81 MG EC TABLET    Take 81 mg by mouth every evening. HOLDING UNTIL INST BY MD    CLOPIDOGREL (PLAVIX) 75 MG TABLET    Take 1 tablet (75 mg total) by mouth once daily.    COLCHICINE (COLCRYS) 0.6 MG TABLET    TAKE 1 TABLET NOW AND 1 AT BEDTIME, THEN 1 DAILY    EZETIMIBE (ZETIA) 10 MG TABLET    Take 1 tablet (10 mg total) by mouth once daily.    IMIQUIMOD (ALDARA) 5 % CREAM    AAA nightly M-F for six weeks as directed.    NITROGLYCERIN (NITROSTAT) 0.4 MG SL TABLET    Place 1 tablet (0.4 mg total) under the tongue every 5 (five) minutes as needed for Chest pain.    PRAVASTATIN (PRAVACHOL) 20  MG TABLET    Take 1 tablet (20 mg total) by mouth every evening.    VALSARTAN-HYDROCHLOROTHIAZIDE (DIOVAN-HCT) 320-12.5 MG PER TABLET    Take 1 tablet by mouth once daily.   Discontinued Medications    FLUOROURACIL (EFUDEX) 5 % CREAM    AAA back of hands, forearms bid x 2-4 weeks    SILDENAFIL (VIAGRA) 100 MG TABLET    Take 1 tablet (100 mg total) by mouth daily as needed for Erectile Dysfunction.       BP Readings from Last 3 Encounters:   03/31/25 132/78   03/25/25 118/68   01/23/25 126/78     Hemoglobin A1C   Date Value Ref Range Status   10/19/2011 5.5 4.0 - 6.2 % Final     Lab Results   Component Value Date    TSH 2.556 02/02/2023     Lab Results   Component Value Date    LDLCALC 83.6 12/24/2024    LDLCALC 63.4 06/27/2024    LDLCALC 62.2 (L) 06/26/2023     Lab Results   Component Value Date    TRIG 127 12/24/2024    TRIG 228 (H) 06/27/2024    TRIG 114 06/26/2023     Wt Readings from Last 3 Encounters:   03/31/25 95.7 kg (210 lb 15.7 oz)   03/20/25 95.7 kg (211 lb)   01/23/25 96.1 kg (211 lb 12.8 oz)     Lab Results   Component Value Date    HGB 15.4 12/24/2024    HCT 44.7 12/24/2024    WBC 5.53 12/24/2024    ALT 33 12/24/2024    AST 25 12/24/2024     12/24/2024    K 4.8 12/24/2024    CREATININE 1.3 12/24/2024    PSA 7.0 (H) 12/10/2019           Review of Systems        Objective:      Vitals:    03/31/25 1128   BP: 132/78   Pulse: 88   Temp: 98 °F (36.7 °C)     Physical Exam  Musculoskeletal:      Comments: Left elbow + swelling, + ecchymosis, mildly TTP  Medial side 2 cm fluid filled blister.  Scrape wound on posterior side with mild erythema

## 2025-04-02 ENCOUNTER — OFFICE VISIT (OUTPATIENT)
Dept: ORTHOPEDICS | Facility: CLINIC | Age: 75
End: 2025-04-02
Payer: MEDICARE

## 2025-04-02 DIAGNOSIS — M25.561 CHRONIC PAIN OF RIGHT KNEE: ICD-10-CM

## 2025-04-02 DIAGNOSIS — G89.29 CHRONIC PAIN OF RIGHT KNEE: ICD-10-CM

## 2025-04-02 DIAGNOSIS — M25.561 CHRONIC PAIN OF RIGHT KNEE: Primary | ICD-10-CM

## 2025-04-02 DIAGNOSIS — G89.29 CHRONIC PAIN OF RIGHT KNEE: Primary | ICD-10-CM

## 2025-04-02 DIAGNOSIS — M17.10 ARTHRITIS OF KNEE: Primary | ICD-10-CM

## 2025-04-02 LAB — CRYSTAL, BODY FLUID (OHS): ABNORMAL

## 2025-04-02 PROCEDURE — 99999 PR PBB SHADOW E&M-EST. PATIENT-LVL II: CPT | Mod: PBBFAC,HCNC,, | Performed by: ORTHOPAEDIC SURGERY

## 2025-04-02 PROCEDURE — 89060 EXAM SYNOVIAL FLUID CRYSTALS: CPT | Mod: HCNC | Performed by: ORTHOPAEDIC SURGERY

## 2025-04-02 PROCEDURE — 85060 BLOOD SMEAR INTERPRETATION: CPT | Mod: HCNC,,, | Performed by: PATHOLOGY

## 2025-04-02 PROCEDURE — 89051 BODY FLUID CELL COUNT: CPT | Mod: HCNC | Performed by: ORTHOPAEDIC SURGERY

## 2025-04-02 RX ORDER — TRIAMCINOLONE ACETONIDE 40 MG/ML
40 INJECTION, SUSPENSION INTRA-ARTICULAR; INTRAMUSCULAR
Status: DISCONTINUED | OUTPATIENT
Start: 2025-04-02 | End: 2025-04-02 | Stop reason: HOSPADM

## 2025-04-02 RX ADMIN — TRIAMCINOLONE ACETONIDE 40 MG: 40 INJECTION, SUSPENSION INTRA-ARTICULAR; INTRAMUSCULAR at 09:04

## 2025-04-02 NOTE — PROGRESS NOTES
Past Medical History:   Diagnosis Date    Allergy     Anticoagulant long-term use     ASA 81 mg, Plavix    BPH (benign prostatic hyperplasia)     BPH (benign prostatic hypertrophy)     Bradycardia     chronic since 2012    Bradycardia 06/21/2012    CAD (coronary artery disease) 02/13/2012 07/11/2011 mid LAD, 80% stenosis; mid LCX, 60% stenosis; mid RCA, 80%       stenosis;                                                                   11/14/2000 LAD, luminal irregularities; LCX, luminal irregularities;        RCA, occluded;                                                                  Cancer     prostate    Cataract     ou done//    CHF (congestive heart failure)     Coronary artery disease     2 stents    Dyslipidemia 02/13/2012    hx increased LFT while on niaspan an  vytorin. Myalgia with lipitor, zocor.     Dyspnea on exertion     stable, chronic    Essential hypertension 02/13/2012    Gout     HEARING LOSS     SEVERE -  No hearing aids anymore    History of myocardial infarction 2000    Hypertension     Nephrolithiasis 1980    passed it on his own    Otitis media     Sleep apnea     use cpap    Status post coronary artery stent placement 06/21/2012                                        Previous PCI:                                                               S/P coated stent to LAD, 07/29/2011                                         S/P coated stent to RCA, 07/29/2011                                         S/P stent to RCA, 11/14/2000             Past Surgical History:   Procedure Laterality Date    CARDIAC CATHETERIZATION      2 stents    CATARACT EXTRACTION W/  INTRAOCULAR LENS IMPLANT Right 11/24/2015    By Dr Byrd    CATARACT EXTRACTION W/  INTRAOCULAR LENS IMPLANT Left 1/12/2016    [facility]    COLONOSCOPY      COLONOSCOPY N/A 1/31/2019    Procedure: COLONOSCOPY;  Surgeon: Evan Basurto MD;  Location: [facility];  Service: Endoscopy;  Laterality: N/A;    COLONOSCOPY, SCREENING, HIGH RISK  PATIENT N/A 3/25/2025    Procedure: COLONOSCOPY, SCREENING, HIGH RISK PATIENT;  Surgeon: Evan Basurto MD;  Location: [facility] ENDO;  Service: Gastroenterology;  Laterality: N/A;    CORONARY STENT PLACEMENT      x 2    CORONARY STENT PLACEMENT N/A 5/9/2022    Procedure: NEREYDA to the LCX and RCA;  Surgeon: Juliano Borrero MD;  Location: [facility] CATH;  Service: Cardiology;  Laterality: N/A;    LEFT HEART CATHETERIZATION Left 5/9/2022    Procedure: Left heart cath;  Surgeon: Juliano Borrero MD;  Location: [facility] CATH;  Service: Cardiology;  Laterality: Left;    ROBOT-ASSISTED LAPAROSCOPIC PROSTATECTOMY USING DA VIVIEN XI N/A 7/31/2020    Procedure: XI ROBOTIC PROSTATECTOMY;  Surgeon: Ranjit Mckeon MD;  Location: [facility] OR;  Service: Urology;  Laterality: N/A;    ROBOT-ASSISTED LAPAROSCOPIC RETROPERITONEAL LYMPHADENECTOMY USING DA VIVIEN XI N/A 7/31/2020    Procedure: XI ROBOTIC LYMPHADENECTOMY, RETROPERITONEUM;  Surgeon: Ranjit Mckeon MD;  Location: [facility] OR;  Service: Urology;  Laterality: N/A;    TRANSRECTAL BIOPSY OF PROSTATE WITH ULTRASOUND GUIDANCE Bilateral 2/10/2020    Procedure: BIOPSY, PROSTATE, RECTAL APPROACH, WITH US GUIDANCE;  Surgeon: Tavares Thomas MD;  Location: [facility] OR;  Service: Urology;  Laterality: Bilateral;       Current Outpatient Medications   Medication Sig    acetaminophen (TYLENOL) 325 MG tablet Take 1 tablet (325 mg total) by mouth every 6 (six) hours as needed for Pain.    amLODIPine (NORVASC) 5 MG tablet Take 1 tablet (5 mg total) by mouth every evening.    aspirin (ECOTRIN) 81 MG EC tablet Take 81 mg by mouth every evening. HOLDING UNTIL INST BY MD    clopidogreL (PLAVIX) 75 mg tablet Take 1 tablet (75 mg total) by mouth once daily.    colchicine (COLCRYS) 0.6 mg tablet TAKE 1 TABLET NOW AND 1 AT BEDTIME, THEN 1 DAILY    ezetimibe (ZETIA) 10 mg tablet Take 1 tablet (10 mg total) by mouth once daily.    imiquimod (ALDARA) 5 % cream AAA nightly M-F for six weeks as directed.    mupirocin  (BACTROBAN) 2 % ointment Apply topically 3 (three) times daily. for 10 days    nitroGLYCERIN (NITROSTAT) 0.4 MG SL tablet Place 1 tablet (0.4 mg total) under the tongue every 5 (five) minutes as needed for Chest pain.    pravastatin (PRAVACHOL) 20 MG tablet Take 1 tablet (20 mg total) by mouth every evening.    valsartan-hydrochlorothiazide (DIOVAN-HCT) 320-12.5 mg per tablet Take 1 tablet by mouth once daily.     No current facility-administered medications for this visit.       Review of patient's allergies indicates:   Allergen Reactions    Niacin Rash    Promethazine Nausea Only    Statins-hmg-coa reductase inhibitors Other (See Comments)     Other reaction(s): Muscle pain       Family History   Problem Relation Name Age of Onset    Stroke Mother      Kidney disease Mother      Glaucoma Mother      Heart failure Father      Heart disease Father      Hypertension Father      Hyperlipidemia Father      Glaucoma Brother      Diabetes Brother      Heart disease Brother      Hyperlipidemia Brother      Prostate cancer Brother      Cancer Sister      Hypertension Son Alfonso     Heart disease Brother      Diabetes Brother      Hyperlipidemia Brother      Heart disease Brother      Hyperlipidemia Brother      Heart disease Brother Alistair     Hyperlipidemia Brother Alistair     Hypertension Brother Alistair     Hypertension Brother Aba     Hyperlipidemia Brother Aba     Heart disease Sister      Hypertension Sister      Hyperlipidemia Sister      Heart disease Sister      Hypertension Sister      Hyperlipidemia Sister      Heart disease Sister      Hypertension Sister      Hyperlipidemia Sister      Prostate cancer Sister      Amblyopia Neg Hx      Blindness Neg Hx      Cataracts Neg Hx      Macular degeneration Neg Hx      Retinal detachment Neg Hx      Strabismus Neg Hx      Thyroid disease Neg Hx         Social History[1]    Chief Complaint: No chief complaint on file.      History of present illness:  74-year-old  male seen for right knee pain and swelling.  Patient has had pain for years.  Does have a history of gout in his knees as well.  Knee pain was aggravated after he had a significant fall into a fence about a week ago.  Patient is on blood thinner so and has bruising and scratches all over his body.  X-rays done at the PCP were negative for fracture.  Pain is a 3/10.  Was just taking Tylenol.  Did not try his gout medicines    Prior treatment:  None        Review of Systems:    Constitution: Negative for chills, fever, and sweats.  Negative for unexplained weight loss.    HENT:  Negative for headaches and blurry vision.    Cardiovascular:Negative for chest pain or irregular heart beat. Negative for hypertension.    Respiratory:  Negative for cough and shortness of breath.    Gastrointestinal: Negative for abdominal pain, heartburn, melena, nausea, and vomitting.    Genitourinary:  Negative bladder incontinence and dysuria.    Musculoskeletal:  See HPI    Neurological: Negative for numbness.    Psychiatric/Behavioral: Negative for depression.  The patient is not nervous/anxious.      Endocrine: Negative for polyuria    Hematologic/Lymphatic: Negative for bleeding problem.  Does not bruise/bleed easily.    Skin: Negative for poor would healing and rash      Physical Examination:    Vital Signs:  There were no vitals filed for this visit.    There is no height or weight on file to calculate BMI.    This a well-developed, well nourished patient in no acute distress.  They are alert and oriented and cooperative to examination.  Pt. walks without an antalgic gait.      Examination of the right knee shows no rashes or erythema. There are no masses ecchymosis and a small effusion. Patient has full range of motion from 0-130°. Patient is nontender to palpation over lateral joint line and nontender to palpation over the medial joint line. Patient has a - Lachman exam, - anterior drawer exam, and - posterior drawer exam. - Radhaey  exam. Knee is stable to varus and valgus stress. 5 out of 5 motor strength. Palpable distal pulses. Intact light touch sensation. Negative Patellofemoral crepitus      X-rays:  X-rays of the left elbow are available for review which show some soft tissue swelling over the olecranon.  X-rays of both knees are available for review which show some mild medial narrowing bilaterally         Assessment:  Right knee pain and swelling    Plan:  I reviewed the findings with him today.  Offered aspiration and injection of his right knee.  We will send off the fluid for analysis and culture.  It could be an arthritic flare up or a gouty flare up.            All previous pertinent notes including ER visits, physical therapy visits, other orthopedic visits as well as other care for the same musculoskeletal problem were reviewed.  All pertinent lab values and previous imaging was reviewed pertinent to the current visit.    This note was created using [employer] voice recognition software that occasionally misinterpreted phrases or words.    Consult note is delivered via Epic messaging service.           [1]   Social History  Socioeconomic History    Marital status:     Number of children: 2   Occupational History    Occupation: retired retired     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     Comment:    Tobacco Use    Smoking status: Never    Smokeless tobacco: Former     Types: Snuff     Quit date: 11/24/2005    Tobacco comments:     dipped tobacco   Substance and Sexual Activity    Alcohol use: No    Drug use: No    Sexual activity: Yes     Partners: Female     Social Drivers of Health     Financial Resource Strain: Low Risk  (11/6/2024)    Overall Financial Resource Strain (CARDIA)     Difficulty of Paying Living Expenses: Not hard at all   Food Insecurity: No Food Insecurity (11/6/2024)    Hunger Vital Sign     Worried About Running Out of Food in the Last Year: Never true     Ran Out of Food in the  Last Year: Never true   Transportation Needs: No Transportation Needs (11/6/2024)    PRAPARE - Transportation     Lack of Transportation (Medical): No     Lack of Transportation (Non-Medical): No   Physical Activity: Inactive (11/6/2024)    Exercise Vital Sign     Days of Exercise per Week: 0 days     Minutes of Exercise per Session: 0 min   Stress: No Stress Concern Present (11/6/2024)    [place] [employer] - Occupational Stress Questionnaire     Feeling of Stress : Not at all   Housing Stability: Unknown (11/6/2024)    Housing Stability Vital Sign     Unable to Pay for Housing in the Last Year: No     Homeless in the Last Year: No

## 2025-04-02 NOTE — PROCEDURES
Large Joint Aspiration/Injection: R knee    Date/Time: 4/2/2025 9:30 AM    Performed by: Chrsitopher Larios MD  Authorized by: Christopher Larios MD    Consent Done?:  Yes (Verbal)  Indications:  Pain  Site marked: the procedure site was marked    Timeout: prior to procedure the correct patient, procedure, and site was verified    Local anesthetic: Ropivicaine.  Anesthetic total (ml):  3      Details:  Needle Size:  18 G  Approach:  Lateral  Location:  Knee  Site:  R knee  Medications:  40 mg triamcinolone acetonide 40 mg/mL  Aspirate amount (mL):  20  Aspirate:  Serous and blood-tinged  Lab: fluid sent for laboratory analysis    Patient tolerance:  Patient tolerated the procedure well with no immediate complications

## 2025-04-03 ENCOUNTER — RESULTS FOLLOW-UP (OUTPATIENT)
Dept: ORTHOPEDICS | Facility: HOSPITAL | Age: 75
End: 2025-04-03

## 2025-04-03 LAB
APPEARANCE FLD: NORMAL
COLOR FLD: NORMAL
LYMPHOCYTES NFR FLD MANUAL: 6 %
MONOS+MACROS NFR FLD MANUAL: 79 %
NEUTROPHILS NFR FLD MANUAL: 15 %
PATHOLOGIST REVIEW - CRYSTALS BF (OHS): NORMAL
WBC # FLD: 210 /CU MM

## 2025-04-04 ENCOUNTER — PATIENT MESSAGE (OUTPATIENT)
Dept: FAMILY MEDICINE | Facility: CLINIC | Age: 75
End: 2025-04-04
Payer: MEDICARE

## 2025-04-07 ENCOUNTER — OFFICE VISIT (OUTPATIENT)
Dept: FAMILY MEDICINE | Facility: CLINIC | Age: 75
End: 2025-04-07
Payer: MEDICARE

## 2025-04-07 VITALS
DIASTOLIC BLOOD PRESSURE: 68 MMHG | WEIGHT: 205.69 LBS | HEIGHT: 70 IN | SYSTOLIC BLOOD PRESSURE: 120 MMHG | HEART RATE: 72 BPM | TEMPERATURE: 95 F | OXYGEN SATURATION: 98 % | BODY MASS INDEX: 29.45 KG/M2

## 2025-04-07 DIAGNOSIS — M10.9 GOUT, UNSPECIFIED CAUSE, UNSPECIFIED CHRONICITY, UNSPECIFIED SITE: ICD-10-CM

## 2025-04-07 DIAGNOSIS — I10 ESSENTIAL HYPERTENSION: ICD-10-CM

## 2025-04-07 DIAGNOSIS — M71.022 ABSCESS OF BURSA OF LEFT ELBOW: Primary | ICD-10-CM

## 2025-04-07 PROCEDURE — 99999 PR PBB SHADOW E&M-EST. PATIENT-LVL IV: CPT | Mod: PBBFAC,HCNC,, | Performed by: INTERNAL MEDICINE

## 2025-04-07 PROCEDURE — 3074F SYST BP LT 130 MM HG: CPT | Mod: HCNC,CPTII,S$GLB, | Performed by: INTERNAL MEDICINE

## 2025-04-07 PROCEDURE — 1160F RVW MEDS BY RX/DR IN RCRD: CPT | Mod: HCNC,CPTII,S$GLB, | Performed by: INTERNAL MEDICINE

## 2025-04-07 PROCEDURE — 99215 OFFICE O/P EST HI 40 MIN: CPT | Mod: HCNC,S$GLB,, | Performed by: INTERNAL MEDICINE

## 2025-04-07 PROCEDURE — G2211 COMPLEX E/M VISIT ADD ON: HCPCS | Mod: HCNC,S$GLB,, | Performed by: INTERNAL MEDICINE

## 2025-04-07 PROCEDURE — 1125F AMNT PAIN NOTED PAIN PRSNT: CPT | Mod: HCNC,CPTII,S$GLB, | Performed by: INTERNAL MEDICINE

## 2025-04-07 PROCEDURE — 1101F PT FALLS ASSESS-DOCD LE1/YR: CPT | Mod: HCNC,CPTII,S$GLB, | Performed by: INTERNAL MEDICINE

## 2025-04-07 PROCEDURE — 3288F FALL RISK ASSESSMENT DOCD: CPT | Mod: HCNC,CPTII,S$GLB, | Performed by: INTERNAL MEDICINE

## 2025-04-07 PROCEDURE — 3078F DIAST BP <80 MM HG: CPT | Mod: HCNC,CPTII,S$GLB, | Performed by: INTERNAL MEDICINE

## 2025-04-07 PROCEDURE — 1159F MED LIST DOCD IN RCRD: CPT | Mod: HCNC,CPTII,S$GLB, | Performed by: INTERNAL MEDICINE

## 2025-04-07 NOTE — PROGRESS NOTES
"Subjective:       Patient ID: Alfonso Ewing is a 74 y.o. male.  Chief Complaint: Arm Pain     HPI      Patient has had a worsening of his left elbow pain now with significant swelling.  Pain keeps him awake at night.  No fever or chills, but has felt very run down and lost his appetite.  Wife states usual for him to sit on the couch all day.      Recall 3/31/25:  Patient tripped over a hose and ran into a fence post 4 days ago.   His arms broke his fall into the post.  Left elbow was swollen and bruised.  Pain has improved to mild since.  Able to use his arm           Saw ortho for knee pain.  Aspiration + crystals.  Will address suppressive therapy next visit  Recall 3/31/25  C/o right knee chronic pain with occasional swelling.  He feels that leg has become weaker from the knee pain and contributed to the above fall.  Sometimes it "gives out."         Gout - rare attacks.  Uric acid mildly high.  Controlled with prn colchicine      CAD - failed repeat stress test, which led to 2 more stents 5/2022.  Hx MI s/p stents x2 prior.  Dr Borrero   Bradycardia - being evaluated by cardiology.  No LOC, lightheadedness, weakness     HTN - controlled.      HLD - controlled goal < 70 LDL on Zetia (CAN) and 40 mg pravastatin.  Previous high dose statin caused muscle pain.   Transaminitis - corrected; card dec pravastatin back down to 20 mg and wants recheck in 4 mo     MAHESH/pre-renal azotemia - likely from dehydration 2nd to fasting labs.  Does not take NSAIDS     Prostate cancer s/p surgical removal 2020.  PSA undetectable       ED - controlled with Viagra.  Was tx by urology; would like me to Rx now.      preDM - stable  High triglycerides - uncontrolled  High OJ and Sweet tea        Assessment:       1. Abscess of bursa of left elbow    2. Essential hypertension    3. Gout, unspecified cause, unspecified chronicity, unspecified site        Plan:       Abscess of bursa of left elbow    Essential hypertension    Gout, unspecified " cause, unspecified chronicity, unspecified site            Concern for septic joint.  Abscess left elbow.  Concern for abscess and infection in elbow joint.  Sending to ER      Visit today included increased complexity associated with the care of the episodic problem HTN addressed and managing the longitudinal care of the patient due to the serious and/or complex managed problem(s) HTN.  Continue current management and monitor.  Other diagnoses were reviewed and found stable and will continue to monitor.  Counseled on regular exercise, maintenance of a healthy weight, balanced diet rich in fruits/vegetables and lean protein, and avoidance of unhealthy habits like smoking and excessive alcohol intake.   Also, counseled on importance of being compliant with medication, health appointments, diet and exercise.     Follow up in about 2 weeks (around 4/21/2025).      Medication List with Changes/Refills   Current Medications    ACETAMINOPHEN (TYLENOL) 325 MG TABLET    Take 1 tablet (325 mg total) by mouth every 6 (six) hours as needed for Pain.    AMLODIPINE (NORVASC) 5 MG TABLET    Take 1 tablet (5 mg total) by mouth every evening.    ASPIRIN (ECOTRIN) 81 MG EC TABLET    Take 81 mg by mouth every evening. HOLDING UNTIL INST BY MD    CLOPIDOGREL (PLAVIX) 75 MG TABLET    Take 1 tablet (75 mg total) by mouth once daily.    COLCHICINE (COLCRYS) 0.6 MG TABLET    TAKE 1 TABLET NOW AND 1 AT BEDTIME, THEN 1 DAILY    EZETIMIBE (ZETIA) 10 MG TABLET    Take 1 tablet (10 mg total) by mouth once daily.    IMIQUIMOD (ALDARA) 5 % CREAM    AAA nightly M-F for six weeks as directed.    MUPIROCIN (BACTROBAN) 2 % OINTMENT    Apply topically 3 (three) times daily. for 10 days    NITROGLYCERIN (NITROSTAT) 0.4 MG SL TABLET    Place 1 tablet (0.4 mg total) under the tongue every 5 (five) minutes as needed for Chest pain.    PRAVASTATIN (PRAVACHOL) 20 MG TABLET    Take 1 tablet (20 mg total) by mouth every evening.     VALSARTAN-HYDROCHLOROTHIAZIDE (DIOVAN-HCT) 320-12.5 MG PER TABLET    Take 1 tablet by mouth once daily.       BP Readings from Last 3 Encounters:   04/07/25 120/68   03/31/25 132/78   03/25/25 118/68     Hemoglobin A1C   Date Value Ref Range Status   10/19/2011 5.5 4.0 - 6.2 % Final     Lab Results   Component Value Date    TSH 2.556 02/02/2023     Lab Results   Component Value Date    LDLCALC 83.6 12/24/2024    LDLCALC 63.4 06/27/2024    LDLCALC 62.2 (L) 06/26/2023     Lab Results   Component Value Date    TRIG 127 12/24/2024    TRIG 228 (H) 06/27/2024    TRIG 114 06/26/2023     Wt Readings from Last 3 Encounters:   04/07/25 93.3 kg (205 lb 11 oz)   03/31/25 95.7 kg (210 lb 15.7 oz)   03/20/25 95.7 kg (211 lb)     Lab Results   Component Value Date    HGB 15.4 12/24/2024    HCT 44.7 12/24/2024    WBC 5.53 12/24/2024    ALT 33 12/24/2024    AST 25 12/24/2024     12/24/2024    K 4.8 12/24/2024    CREATININE 1.3 12/24/2024    PSA 7.0 (H) 12/10/2019           Review of Systems        Objective:      Vitals:    04/07/25 1129   BP: 120/68   Pulse: 72   Temp: (!) 95.1 °F (35.1 °C)     Physical Exam  Musculoskeletal:      Comments: Left elbow: + 10 cm swelling over posterior side.  + erythema.  + TTP.  Temperature to touch not significantly warm.  + abrasions over elbow.  No residual blisters.    + pain with full extension

## 2025-04-09 ENCOUNTER — OFFICE VISIT (OUTPATIENT)
Dept: ORTHOPEDICS | Facility: CLINIC | Age: 75
End: 2025-04-09
Payer: MEDICARE

## 2025-04-09 VITALS — BODY MASS INDEX: 29.45 KG/M2 | HEIGHT: 70 IN | WEIGHT: 205.69 LBS

## 2025-04-09 DIAGNOSIS — S50.02XA HEMATOMA OF LEFT ELBOW: Primary | ICD-10-CM

## 2025-04-09 DIAGNOSIS — S59.902A INJURY OF LEFT ELBOW, INITIAL ENCOUNTER: ICD-10-CM

## 2025-04-09 PROCEDURE — 99999 PR PBB SHADOW E&M-EST. PATIENT-LVL III: CPT | Mod: PBBFAC,HCNC,, | Performed by: PHYSICIAN ASSISTANT

## 2025-04-09 RX ORDER — DOXYCYCLINE 100 MG/1
100 CAPSULE ORAL EVERY 12 HOURS
Qty: 14 CAPSULE | Refills: 0 | Status: SHIPPED | OUTPATIENT
Start: 2025-04-09 | End: 2025-04-16

## 2025-04-09 NOTE — PROGRESS NOTES
4/9/2025    HPI:  Alfonso Ewing is a 74 y.o. male, who presents to clinic today for evaluation of his left elbow injury.  States 2 weeks ago he tripped and fell on a fence.  States he had brace himself with his left elbow.  States immediate pain, bleeding, and difficulty using the left elbow.  States this prompted him to follow up with his primary care provider.  States he has provided antibiotic ointment.  States he had worsening swelling and pain, which prompted him to follow up with the emergency department.  States x-rays were taken which showed no acute fracture.  States he is placed on antibiotics and instructed to follow up with our clinic to rule out a possible septic joint.  Denies any other complaints at this time.    PMHX:  Past Medical History:   Diagnosis Date    Allergy     Anticoagulant long-term use     ASA 81 mg, Plavix    BPH (benign prostatic hyperplasia)     BPH (benign prostatic hypertrophy)     Bradycardia     chronic since 2012    Bradycardia 06/21/2012    CAD (coronary artery disease) 02/13/2012 07/11/2011 mid LAD, 80% stenosis; mid LCX, 60% stenosis; mid RCA, 80%       stenosis;                                                                   11/14/2000 LAD, luminal irregularities; LCX, luminal irregularities;        RCA, occluded;                                                                  Cancer     prostate    Cataract     ou done//    CHF (congestive heart failure)     Coronary artery disease     2 stents    Dyslipidemia 02/13/2012    hx increased LFT while on niaspan an  vytorin. Myalgia with lipitor, zocor.     Dyspnea on exertion     stable, chronic    Essential hypertension 02/13/2012    Gout     HEARING LOSS     SEVERE -  No hearing aids anymore    History of myocardial infarction 2000    Hypertension     Nephrolithiasis 1980    passed it on his own    Otitis media     Sleep apnea     use cpap    Status post coronary artery stent placement 06/21/2012                                         Previous PCI:                                                               S/P coated stent to LAD, 07/29/2011                                         S/P coated stent to RCA, 07/29/2011                                         S/P stent to RCA, 11/14/2000             PSHX:  Past Surgical History:   Procedure Laterality Date    CARDIAC CATHETERIZATION      2 stents    CATARACT EXTRACTION W/  INTRAOCULAR LENS IMPLANT Right 11/24/2015    By Dr Bryd    CATARACT EXTRACTION W/  INTRAOCULAR LENS IMPLANT Left 1/12/2016    [facility]    COLONOSCOPY      COLONOSCOPY N/A 1/31/2019    Procedure: COLONOSCOPY;  Surgeon: Evan Basurto MD;  Location: [facility] ENDO;  Service: Endoscopy;  Laterality: N/A;    COLONOSCOPY, SCREENING, HIGH RISK PATIENT N/A 3/25/2025    Procedure: COLONOSCOPY, SCREENING, HIGH RISK PATIENT;  Surgeon: Evan Basutro MD;  Location: [facility] ENDO;  Service: Gastroenterology;  Laterality: N/A;    CORONARY STENT PLACEMENT      x 2    CORONARY STENT PLACEMENT N/A 5/9/2022    Procedure: NEREYDA to the LCX and RCA;  Surgeon: Juliano Borrero MD;  Location: [facility] CATH;  Service: Cardiology;  Laterality: N/A;    LEFT HEART CATHETERIZATION Left 5/9/2022    Procedure: Left heart cath;  Surgeon: Juliano Borrero MD;  Location: [facility] CATH;  Service: Cardiology;  Laterality: Left;    ROBOT-ASSISTED LAPAROSCOPIC PROSTATECTOMY USING DA VIVIEN XI N/A 7/31/2020    Procedure: XI ROBOTIC PROSTATECTOMY;  Surgeon: Ranjit Mckeon MD;  Location: [facility] OR;  Service: Urology;  Laterality: N/A;    ROBOT-ASSISTED LAPAROSCOPIC RETROPERITONEAL LYMPHADENECTOMY USING DA VIVIEN XI N/A 7/31/2020    Procedure: XI ROBOTIC LYMPHADENECTOMY, RETROPERITONEUM;  Surgeon: Ranjit Mckeon MD;  Location: [facility] OR;  Service: Urology;  Laterality: N/A;    TRANSRECTAL BIOPSY OF PROSTATE WITH ULTRASOUND GUIDANCE Bilateral 2/10/2020    Procedure: BIOPSY, PROSTATE, RECTAL APPROACH, WITH US GUIDANCE;  Surgeon: Tavares Thomas MD;  Location:  [facility] OR;  Service: Urology;  Laterality: Bilateral;       FMHX:  Family History   Problem Relation Name Age of Onset    Stroke Mother      Kidney disease Mother      Glaucoma Mother      Heart failure Father      Heart disease Father      Hypertension Father      Hyperlipidemia Father      Glaucoma Brother      Diabetes Brother      Heart disease Brother      Hyperlipidemia Brother      Prostate cancer Brother      Cancer Sister      Hypertension Son Alfonso     Heart disease Brother      Diabetes Brother      Hyperlipidemia Brother      Heart disease Brother      Hyperlipidemia Brother      Heart disease Brother Alistair     Hyperlipidemia Brother Alistair     Hypertension Brother Alistair     Hypertension Brother Aba     Hyperlipidemia Brother Aba     Heart disease Sister      Hypertension Sister      Hyperlipidemia Sister      Heart disease Sister      Hypertension Sister      Hyperlipidemia Sister      Heart disease Sister      Hypertension Sister      Hyperlipidemia Sister      Prostate cancer Sister      Amblyopia Neg Hx      Blindness Neg Hx      Cataracts Neg Hx      Macular degeneration Neg Hx      Retinal detachment Neg Hx      Strabismus Neg Hx      Thyroid disease Neg Hx         SOCHX:  Social History     Tobacco Use    Smoking status: Never    Smokeless tobacco: Former     Types: Snuff     Quit date: 11/24/2005    Tobacco comments:     dipped tobacco   Substance Use Topics    Alcohol use: No       ALLERGIES:  Niacin, Promethazine, and Statins-hmg-coa reductase inhibitors    CURRENT MEDICATIONS:  Medications Ordered Prior to Encounter[1]    REVIEW OF SYSTEMS:  Review of Systems Complete; Negative, unless noted above.    GENERAL PHYSICAL EXAM:   Ht 5' 10" (1.778 m)   Wt 93.3 kg (205 lb 11 oz)   BMI 29.51 kg/m²    GEN: well developed, well nourished, no acute distress   PULM: No wheezing, no respiratory distress   CV: RRR    ORTHO EXAM:   Examination of the left elbow reveals moderate edema of the " posterior and medial aspects of the left elbow.  Presence of ecchymosis both proximally up the upper arm and distally down the forearm.  Presence of fluctuance over the areas of the edema, which does mostly appear consistent with a hematoma due to the lack of pain on palpation and the lack of any significant erythema no drainage, purulence, or any other signs of gross infection.  Able to flex extend the elbow with no pain.  Presence of superficial abrasions I do appear to be healing appropriately.  Sensation is grossly intact of the left elbow.  Capillary refill less than 2 seconds.    RADIOLOGY:   X-rays of the left elbow were taken 2 days ago.  X-rays reviewed by myself in clinic today.  Imaging showed no acute fracture or dislocation no subluxation.  No joint effusion.  No radiopaque foreign body or mass noted no osseous destructive/erosive processes noted.  Presence of soft tissue swelling over the posterior and medial aspects of the left elbow without any signs of emphysema.  No other significant bony abnormalities noted.    ASSESSMENT:   Left elbow injury/hematoma    PLAN:  1. I discussed with Alfonso Ewing and his wife that the best course of action this time is to place a slight compression dressing on the elbow and transition his antibiotics from Keflex to doxycycline.  We did discuss a closely monitor the elbow, and for any progressing redness, swelling, or pain to contact the clinic immediately report to nearest emergency department.  They verbally agreed with the treatment plan.      2.  He is prescribed doxycycline 100 mg to be taken twice daily for the next week.  He was instructed to avoid the sun and taken the medicine with food.  He verbalized understanding.      3. His left elbow was placed in a mild compressive dressing using a 4 x 4 gauze and a 3 inch Ace wrap.    4.  I would like him follow up in clinic in 1 week for repeat evaluation.  They were instructed to contact the clinic for any problems  or concerns in the interim.           [1]   Current Outpatient Medications on File Prior to Visit   Medication Sig Dispense Refill    acetaminophen (TYLENOL) 325 MG tablet Take 1 tablet (325 mg total) by mouth every 6 (six) hours as needed for Pain.      amLODIPine (NORVASC) 5 MG tablet Take 1 tablet (5 mg total) by mouth every evening. 90 tablet 4    aspirin (ECOTRIN) 81 MG EC tablet Take 81 mg by mouth every evening. HOLDING UNTIL INST BY MD      cephALEXin (KEFLEX) 500 MG capsule Take 1 capsule (500 mg total) by mouth every 8 (eight) hours. for 5 days 15 capsule 0    clopidogreL (PLAVIX) 75 mg tablet Take 1 tablet (75 mg total) by mouth once daily. 90 tablet 4    colchicine (COLCRYS) 0.6 mg tablet TAKE 1 TABLET NOW AND 1 AT BEDTIME, THEN 1 DAILY 90 tablet 4    ezetimibe (ZETIA) 10 mg tablet Take 1 tablet (10 mg total) by mouth once daily. 90 tablet 4    imiquimod (ALDARA) 5 % cream AAA nightly M-F for six weeks as directed. 48 packet 1    mupirocin (BACTROBAN) 2 % ointment Apply topically 3 (three) times daily. for 10 days 15 g 0    nitroGLYCERIN (NITROSTAT) 0.4 MG SL tablet Place 1 tablet (0.4 mg total) under the tongue every 5 (five) minutes as needed for Chest pain. 25 tablet 4    pravastatin (PRAVACHOL) 20 MG tablet Take 1 tablet (20 mg total) by mouth every evening. 90 tablet 4    valsartan-hydrochlorothiazide (DIOVAN-HCT) 320-12.5 mg per tablet Take 1 tablet by mouth once daily. 90 tablet 3     No current facility-administered medications on file prior to visit.

## 2025-04-16 ENCOUNTER — OFFICE VISIT (OUTPATIENT)
Dept: ORTHOPEDICS | Facility: CLINIC | Age: 75
End: 2025-04-16
Payer: MEDICARE

## 2025-04-16 DIAGNOSIS — S50.02XA HEMATOMA OF LEFT ELBOW: ICD-10-CM

## 2025-04-16 PROCEDURE — 1126F AMNT PAIN NOTED NONE PRSNT: CPT | Mod: HCNC,CPTII,S$GLB, | Performed by: PHYSICIAN ASSISTANT

## 2025-04-16 PROCEDURE — 3288F FALL RISK ASSESSMENT DOCD: CPT | Mod: HCNC,CPTII,S$GLB, | Performed by: PHYSICIAN ASSISTANT

## 2025-04-16 PROCEDURE — 99999 PR PBB SHADOW E&M-EST. PATIENT-LVL III: CPT | Mod: PBBFAC,HCNC,, | Performed by: PHYSICIAN ASSISTANT

## 2025-04-16 PROCEDURE — 1101F PT FALLS ASSESS-DOCD LE1/YR: CPT | Mod: HCNC,CPTII,S$GLB, | Performed by: PHYSICIAN ASSISTANT

## 2025-04-16 PROCEDURE — 1159F MED LIST DOCD IN RCRD: CPT | Mod: HCNC,CPTII,S$GLB, | Performed by: PHYSICIAN ASSISTANT

## 2025-04-16 PROCEDURE — 1160F RVW MEDS BY RX/DR IN RCRD: CPT | Mod: HCNC,CPTII,S$GLB, | Performed by: PHYSICIAN ASSISTANT

## 2025-04-16 PROCEDURE — 99213 OFFICE O/P EST LOW 20 MIN: CPT | Mod: HCNC,S$GLB,, | Performed by: PHYSICIAN ASSISTANT

## 2025-04-16 RX ORDER — DOXYCYCLINE 100 MG/1
100 CAPSULE ORAL EVERY 12 HOURS
Qty: 6 CAPSULE | Refills: 0 | Status: SHIPPED | OUTPATIENT
Start: 2025-04-16 | End: 2025-04-19

## 2025-04-16 NOTE — PROGRESS NOTES
4/16/2025    HPI:  Alfonso Ewing is a 74 y.o. male, who presents to clinic today for continued evaluation of his left elbow injury/hematoma.  States he did noticed some drainage recently of the elbow.  States that has since stopped.  Denies any acute injuries since his last visit.  States he has taken the antibiotics as instructed.  Denies any other complaints at this time.    PMHX:  Past Medical History:   Diagnosis Date    Allergy     Anticoagulant long-term use     ASA 81 mg, Plavix    BPH (benign prostatic hyperplasia)     BPH (benign prostatic hypertrophy)     Bradycardia     chronic since 2012    Bradycardia 06/21/2012    CAD (coronary artery disease) 02/13/2012 07/11/2011 mid LAD, 80% stenosis; mid LCX, 60% stenosis; mid RCA, 80%       stenosis;                                                                   11/14/2000 LAD, luminal irregularities; LCX, luminal irregularities;        RCA, occluded;                                                                  Cancer     prostate    Cataract     ou done//    CHF (congestive heart failure)     Coronary artery disease     2 stents    Dyslipidemia 02/13/2012    hx increased LFT while on niaspan an  vytorin. Myalgia with lipitor, zocor.     Dyspnea on exertion     stable, chronic    Essential hypertension 02/13/2012    Gout     HEARING LOSS     SEVERE -  No hearing aids anymore    History of myocardial infarction 2000    Hypertension     Nephrolithiasis 1980    passed it on his own    Otitis media     Sleep apnea     use cpap    Status post coronary artery stent placement 06/21/2012                                        Previous PCI:                                                               S/P coated stent to LAD, 07/29/2011                                         S/P coated stent to RCA, 07/29/2011                                         S/P stent to RCA, 11/14/2000             PSHX:  Past Surgical History:   Procedure Laterality Date    CARDIAC  CATHETERIZATION      2 stents    CATARACT EXTRACTION W/  INTRAOCULAR LENS IMPLANT Right 11/24/2015    By Dr Byrd    CATARACT EXTRACTION W/  INTRAOCULAR LENS IMPLANT Left 1/12/2016    [facility]    COLONOSCOPY      COLONOSCOPY N/A 1/31/2019    Procedure: COLONOSCOPY;  Surgeon: Evan Basurto MD;  Location: [facility] ENDO;  Service: Endoscopy;  Laterality: N/A;    COLONOSCOPY, SCREENING, HIGH RISK PATIENT N/A 3/25/2025    Procedure: COLONOSCOPY, SCREENING, HIGH RISK PATIENT;  Surgeon: Evan Basurto MD;  Location: [facility] ENDO;  Service: Gastroenterology;  Laterality: N/A;    CORONARY STENT PLACEMENT      x 2    CORONARY STENT PLACEMENT N/A 5/9/2022    Procedure: NEREYDA to the LCX and RCA;  Surgeon: Juliano Borrero MD;  [facility];  Service: Cardiology;  Laterality: N/A;    LEFT HEART CATHETERIZATION Left 5/9/2022    Procedure: Left heart cath;  Surgeon: Juliano Borrero MD;  [facility];  Service: Cardiology;  Laterality: Left;    ROBOT-ASSISTED LAPAROSCOPIC PROSTATECTOMY USING DA VIVIEN XI N/A 7/31/2020    Procedure: XI ROBOTIC PROSTATECTOMY;  Surgeon: Ranjit Mckeon MD;  Location: [facility] OR;  Service: Urology;  Laterality: N/A;    ROBOT-ASSISTED LAPAROSCOPIC RETROPERITONEAL LYMPHADENECTOMY USING DA VIVIEN XI N/A 7/31/2020    Procedure: XI ROBOTIC LYMPHADENECTOMY, RETROPERITONEUM;  Surgeon: Ranjit Mckeon MD;  Location: [facility] OR;  Service: Urology;  Laterality: N/A;    TRANSRECTAL BIOPSY OF PROSTATE WITH ULTRASOUND GUIDANCE Bilateral 2/10/2020    Procedure: BIOPSY, PROSTATE, RECTAL APPROACH, WITH US GUIDANCE;  Surgeon: Tavares Thomas MD;  Location: [facility] OR;  Service: Urology;  Laterality: Bilateral;       FMHX:  Family History   Problem Relation Name Age of Onset    Stroke Mother      Kidney disease Mother      Glaucoma Mother      Heart failure Father      Heart disease Father      Hypertension Father      Hyperlipidemia Father      Glaucoma Brother      Diabetes Brother      Heart disease  Brother      Hyperlipidemia Brother      Prostate cancer Brother      Cancer Sister      Hypertension Son Alfonso     Heart disease Brother      Diabetes Brother      Hyperlipidemia Brother      Heart disease Brother      Hyperlipidemia Brother      Heart disease Brother Alistair     Hyperlipidemia Brother Alistair     Hypertension Brother Alistair     Hypertension Brother Aba     Hyperlipidemia Brother Aba     Heart disease Sister      Hypertension Sister      Hyperlipidemia Sister      Heart disease Sister      Hypertension Sister      Hyperlipidemia Sister      Heart disease Sister      Hypertension Sister      Hyperlipidemia Sister      Prostate cancer Sister      Amblyopia Neg Hx      Blindness Neg Hx      Cataracts Neg Hx      Macular degeneration Neg Hx      Retinal detachment Neg Hx      Strabismus Neg Hx      Thyroid disease Neg Hx         SOCHX:  Social History     Tobacco Use    Smoking status: Never    Smokeless tobacco: Former     Types: Snuff     Quit date: 11/24/2005    Tobacco comments:     dipped tobacco   Substance Use Topics    Alcohol use: No       ALLERGIES:  Niacin, Promethazine, and Statins-hmg-coa reductase inhibitors    CURRENT MEDICATIONS:  Medications Ordered Prior to Encounter[1]    REVIEW OF SYSTEMS:  Review of Systems Complete; Negative, unless noted above.    GENERAL PHYSICAL EXAM:   There were no vitals taken for this visit.   GEN: well developed, well nourished, no acute distress   PULM: No wheezing, no respiratory distress   CV: RRR    ORTHO EXAM:   Examination of the left elbow reveals mild edema.  No erythema, ecchymosis, fluctuance, drainage, purulence, or any other signs of infection.  Presence of appropriately healing wounds overlying the dorsal medial aspect of the elbow.  Normal sensation in the radial, ulnar, and median nerve distributions.  Capillary refill less than 2 seconds.    RADIOLOGY:   None.    ASSESSMENT:   Left elbow injury/hematoma    PLAN:  1. I discussed with  Alfonso Ewing and his wife that he is progressing appropriately in the treatment course.  We did discuss the importance of closely monitoring the elbow for any signs of redness, purulence, or pain.  We discussed for any signs to contact the clinic immediately report to nearest emergency department.      2. His left elbow was thoroughly cleaned in clinic today with chlorhexidine.  His left elbow that was then dressed with 4 x 4 gauze, cast padding, and an Ace wrap.      3.  I would like him follow up in clinic in 6 days for repeat evaluation.  He was instructed to contact the clinic for any problems or concerns in the interim.           [1]   Current Outpatient Medications on File Prior to Visit   Medication Sig Dispense Refill    acetaminophen (TYLENOL) 325 MG tablet Take 1 tablet (325 mg total) by mouth every 6 (six) hours as needed for Pain.      amLODIPine (NORVASC) 5 MG tablet Take 1 tablet (5 mg total) by mouth every evening. 90 tablet 4    aspirin (ECOTRIN) 81 MG EC tablet Take 81 mg by mouth every evening. HOLDING UNTIL INST BY MD      clopidogreL (PLAVIX) 75 mg tablet Take 1 tablet (75 mg total) by mouth once daily. 90 tablet 4    colchicine (COLCRYS) 0.6 mg tablet TAKE 1 TABLET NOW AND 1 AT BEDTIME, THEN 1 DAILY 90 tablet 4    ezetimibe (ZETIA) 10 mg tablet Take 1 tablet (10 mg total) by mouth once daily. 90 tablet 4    imiquimod (ALDARA) 5 % cream AAA nightly M-F for six weeks as directed. 48 packet 1    nitroGLYCERIN (NITROSTAT) 0.4 MG SL tablet Place 1 tablet (0.4 mg total) under the tongue every 5 (five) minutes as needed for Chest pain. 25 tablet 4    pravastatin (PRAVACHOL) 20 MG tablet Take 1 tablet (20 mg total) by mouth every evening. 90 tablet 4    valsartan-hydrochlorothiazide (DIOVAN-HCT) 320-12.5 mg per tablet Take 1 tablet by mouth once daily. 90 tablet 3    [DISCONTINUED] doxycycline (VIBRAMYCIN) 100 MG Cap Take 1 capsule (100 mg total) by mouth every 12 (twelve) hours. for 7 days 14 capsule  0     No current facility-administered medications on file prior to visit.

## 2025-04-21 ENCOUNTER — OFFICE VISIT (OUTPATIENT)
Dept: FAMILY MEDICINE | Facility: CLINIC | Age: 75
End: 2025-04-21
Payer: MEDICARE

## 2025-04-21 VITALS
TEMPERATURE: 97 F | WEIGHT: 207.69 LBS | SYSTOLIC BLOOD PRESSURE: 130 MMHG | HEART RATE: 48 BPM | OXYGEN SATURATION: 98 % | DIASTOLIC BLOOD PRESSURE: 72 MMHG | HEIGHT: 70 IN | BODY MASS INDEX: 29.73 KG/M2

## 2025-04-21 DIAGNOSIS — M10.9 GOUT, UNSPECIFIED CAUSE, UNSPECIFIED CHRONICITY, UNSPECIFIED SITE: ICD-10-CM

## 2025-04-21 DIAGNOSIS — I10 ESSENTIAL HYPERTENSION: ICD-10-CM

## 2025-04-21 DIAGNOSIS — S50.02XA HEMATOMA OF LEFT ELBOW: Primary | ICD-10-CM

## 2025-04-21 PROCEDURE — 1101F PT FALLS ASSESS-DOCD LE1/YR: CPT | Mod: HCNC,CPTII,S$GLB, | Performed by: INTERNAL MEDICINE

## 2025-04-21 PROCEDURE — 3078F DIAST BP <80 MM HG: CPT | Mod: HCNC,CPTII,S$GLB, | Performed by: INTERNAL MEDICINE

## 2025-04-21 PROCEDURE — G2211 COMPLEX E/M VISIT ADD ON: HCPCS | Mod: HCNC,S$GLB,, | Performed by: INTERNAL MEDICINE

## 2025-04-21 PROCEDURE — 3075F SYST BP GE 130 - 139MM HG: CPT | Mod: HCNC,CPTII,S$GLB, | Performed by: INTERNAL MEDICINE

## 2025-04-21 PROCEDURE — 1159F MED LIST DOCD IN RCRD: CPT | Mod: HCNC,CPTII,S$GLB, | Performed by: INTERNAL MEDICINE

## 2025-04-21 PROCEDURE — 3288F FALL RISK ASSESSMENT DOCD: CPT | Mod: HCNC,CPTII,S$GLB, | Performed by: INTERNAL MEDICINE

## 2025-04-21 PROCEDURE — 99214 OFFICE O/P EST MOD 30 MIN: CPT | Mod: HCNC,S$GLB,, | Performed by: INTERNAL MEDICINE

## 2025-04-21 PROCEDURE — 99999 PR PBB SHADOW E&M-EST. PATIENT-LVL IV: CPT | Mod: PBBFAC,HCNC,, | Performed by: INTERNAL MEDICINE

## 2025-04-21 PROCEDURE — 1125F AMNT PAIN NOTED PAIN PRSNT: CPT | Mod: HCNC,CPTII,S$GLB, | Performed by: INTERNAL MEDICINE

## 2025-04-21 RX ORDER — ALLOPURINOL 300 MG/1
300 TABLET ORAL DAILY
Qty: 30 TABLET | Refills: 11 | Status: SHIPPED | OUTPATIENT
Start: 2025-04-21

## 2025-04-21 RX ORDER — ALLOPURINOL 100 MG/1
TABLET ORAL
Qty: 25 TABLET | Refills: 0 | Status: SHIPPED | OUTPATIENT
Start: 2025-04-21 | End: 2025-05-12

## 2025-04-21 NOTE — PROGRESS NOTES
"Subjective:       Patient ID: Alfonso Ewing is a 74 y.o. male.  Chief Complaint: Hospital Follow Up     HPI      Left elbow hematoma with abrasion - healing.  Still having mild bloody drainage, but much less.  No yellow drainage.  2nd to fall.  Seeing ortho tomorrow.  S/p doxy      Saw ortho for knee pain.  Aspiration + crystals.    Recall 3/31/25  C/o right knee chronic pain with occasional swelling.  He feels that leg has become weaker from the knee pain and contributed to the above fall.  Sometimes it "gives out."         Gout - + crystals in knee aspiration.  UA 9.  Controlled with prn colchicine but taking frequently      CAD - failed repeat stress test, which led to 2 more stents 5/2022.  Hx MI s/p stents x2 prior.  Dr Borrero   Bradycardia - being evaluated by cardiology.  No LOC, lightheadedness, weakness     HTN - controlled.      HLD - controlled goal < 70 LDL on Zetia (CAN) and 40 mg pravastatin.  Previous high dose statin caused muscle pain.   Transaminitis - corrected; card dec pravastatin back down to 20 mg and wants recheck in 4 mo     CKD IIIa - mild;   Does not take NSAIDS     Prostate cancer s/p surgical removal 2020.  PSA undetectable       ED - controlled with Viagra.  Was tx by urology; would like me to Rx now.      preDM - stable  High triglycerides - uncontrolled  High OJ and Sweet tea    Assessment:       1. Hematoma of left elbow    2. Gout, unspecified cause, unspecified chronicity, unspecified site    3. Essential hypertension        Plan:       Hematoma of left elbow    Gout, unspecified cause, unspecified chronicity, unspecified site  -     allopurinoL (ZYLOPRIM) 100 MG tablet; Take 0.5 tablets (50 mg total) by mouth once daily for 7 days, THEN 1 tablet (100 mg total) once daily for 7 days, THEN 2 tablets (200 mg total) once daily for 7 days. Then start the 300 mg prescription.  Dispense: 25 tablet; Refill: 0  -     allopurinoL (ZYLOPRIM) 300 MG tablet; Take 1 tablet (300 mg total) by mouth " once daily.  Dispense: 30 tablet; Refill: 11    Essential hypertension          Visit today included increased complexity associated with the care of the episodic problem HTN addressed and managing the longitudinal care of the patient due to the serious and/or complex managed problem(s) HTN.  Continue current management and monitor.  Other diagnoses were reviewed and found stable and will continue to monitor.  Counseled on regular exercise, maintenance of a healthy weight, balanced diet rich in fruits/vegetables and lean protein, and avoidance of unhealthy habits like smoking and excessive alcohol intake.   Also, counseled on importance of being compliant with medication, health appointments, diet and exercise.     No follow-ups on file.  7/8      Medication List with Changes/Refills   New Medications    ALLOPURINOL (ZYLOPRIM) 100 MG TABLET    Take 0.5 tablets (50 mg total) by mouth once daily for 7 days, THEN 1 tablet (100 mg total) once daily for 7 days, THEN 2 tablets (200 mg total) once daily for 7 days. Then start the 300 mg prescription.    ALLOPURINOL (ZYLOPRIM) 300 MG TABLET    Take 1 tablet (300 mg total) by mouth once daily.   Current Medications    ACETAMINOPHEN (TYLENOL) 325 MG TABLET    Take 1 tablet (325 mg total) by mouth every 6 (six) hours as needed for Pain.    AMLODIPINE (NORVASC) 5 MG TABLET    Take 1 tablet (5 mg total) by mouth every evening.    ASPIRIN (ECOTRIN) 81 MG EC TABLET    Take 81 mg by mouth every evening. HOLDING UNTIL INST BY MD    CLOPIDOGREL (PLAVIX) 75 MG TABLET    Take 1 tablet (75 mg total) by mouth once daily.    COLCHICINE (COLCRYS) 0.6 MG TABLET    TAKE 1 TABLET NOW AND 1 AT BEDTIME, THEN 1 DAILY    EZETIMIBE (ZETIA) 10 MG TABLET    Take 1 tablet (10 mg total) by mouth once daily.    IMIQUIMOD (ALDARA) 5 % CREAM    AAA nightly M-F for six weeks as directed.    NITROGLYCERIN (NITROSTAT) 0.4 MG SL TABLET    Place 1 tablet (0.4 mg total) under the tongue every 5 (five) minutes  as needed for Chest pain.    PRAVASTATIN (PRAVACHOL) 20 MG TABLET    Take 1 tablet (20 mg total) by mouth every evening.    VALSARTAN-HYDROCHLOROTHIAZIDE (DIOVAN-HCT) 320-12.5 MG PER TABLET    Take 1 tablet by mouth once daily.       BP Readings from Last 3 Encounters:   04/21/25 130/72   04/07/25 (!) 155/80   04/07/25 120/68     Hemoglobin A1C   Date Value Ref Range Status   10/19/2011 5.5 4.0 - 6.2 % Final     Lab Results   Component Value Date    TSH 2.556 02/02/2023     Lab Results   Component Value Date    LDLCALC 83.6 12/24/2024    LDLCALC 63.4 06/27/2024    LDLCALC 62.2 (L) 06/26/2023     Lab Results   Component Value Date    TRIG 127 12/24/2024    TRIG 228 (H) 06/27/2024    TRIG 114 06/26/2023     Wt Readings from Last 3 Encounters:   04/21/25 94.2 kg (207 lb 10.8 oz)   04/09/25 93.3 kg (205 lb 11 oz)   04/07/25 93.3 kg (205 lb 11 oz)     Lab Results   Component Value Date    HGB 15.1 04/07/2025    HCT 44.8 04/07/2025    WBC 10.20 04/07/2025    ALT 28 04/07/2025    AST 24 04/07/2025     04/07/2025    K 4.3 04/07/2025    CREATININE 1.47 (H) 04/07/2025    PSA 7.0 (H) 12/10/2019           Review of Systems        Objective:      Vitals:    04/21/25 0916   BP: 130/72   Pulse: (!) 48   Temp: 97.3 °F (36.3 °C)     Physical Exam  Cardiovascular:      Rate and Rhythm: Normal rate and regular rhythm.   Pulmonary:      Effort: Pulmonary effort is normal.      Breath sounds: Normal breath sounds.   Skin:     Comments: Left elbow: no erythema.  Mild swelling.  3 cm abrasion with granular tissue and no yellow exudate

## 2025-04-22 ENCOUNTER — OFFICE VISIT (OUTPATIENT)
Dept: ORTHOPEDICS | Facility: CLINIC | Age: 75
End: 2025-04-22
Payer: MEDICARE

## 2025-04-22 VITALS — HEIGHT: 70 IN | BODY MASS INDEX: 29.73 KG/M2 | WEIGHT: 207.69 LBS

## 2025-04-22 DIAGNOSIS — S59.902A INJURY OF LEFT ELBOW, INITIAL ENCOUNTER: ICD-10-CM

## 2025-04-22 DIAGNOSIS — S50.02XA HEMATOMA OF LEFT ELBOW: Primary | ICD-10-CM

## 2025-04-22 PROCEDURE — 3288F FALL RISK ASSESSMENT DOCD: CPT | Mod: HCNC,CPTII,S$GLB, | Performed by: PHYSICIAN ASSISTANT

## 2025-04-22 PROCEDURE — 99999 PR PBB SHADOW E&M-EST. PATIENT-LVL III: CPT | Mod: PBBFAC,HCNC,, | Performed by: PHYSICIAN ASSISTANT

## 2025-04-22 PROCEDURE — 1125F AMNT PAIN NOTED PAIN PRSNT: CPT | Mod: HCNC,CPTII,S$GLB, | Performed by: PHYSICIAN ASSISTANT

## 2025-04-22 PROCEDURE — 1159F MED LIST DOCD IN RCRD: CPT | Mod: HCNC,CPTII,S$GLB, | Performed by: PHYSICIAN ASSISTANT

## 2025-04-22 PROCEDURE — 1101F PT FALLS ASSESS-DOCD LE1/YR: CPT | Mod: HCNC,CPTII,S$GLB, | Performed by: PHYSICIAN ASSISTANT

## 2025-04-22 PROCEDURE — 1160F RVW MEDS BY RX/DR IN RCRD: CPT | Mod: HCNC,CPTII,S$GLB, | Performed by: PHYSICIAN ASSISTANT

## 2025-04-22 PROCEDURE — 99213 OFFICE O/P EST LOW 20 MIN: CPT | Mod: HCNC,S$GLB,, | Performed by: PHYSICIAN ASSISTANT

## 2025-04-25 NOTE — PROGRESS NOTES
4/25/2025    HPI:  Alfonso Ewing is a 75 y.o. male, who presents to clinic today for continued evaluation of his left elbow injury with healing wounds.  States he was finish taking the doxycycline.  States the drainage from his wound has significantly improved.  Denies any acute injuries since his last visit.  Denies any other complaints at this time.    PMHX:  Past Medical History:   Diagnosis Date    Allergy     Anticoagulant long-term use     ASA 81 mg, Plavix    BPH (benign prostatic hyperplasia)     BPH (benign prostatic hypertrophy)     Bradycardia     chronic since 2012    Bradycardia 06/21/2012    CAD (coronary artery disease) 02/13/2012 07/11/2011 mid LAD, 80% stenosis; mid LCX, 60% stenosis; mid RCA, 80%       stenosis;                                                                   11/14/2000 LAD, luminal irregularities; LCX, luminal irregularities;        RCA, occluded;                                                                  Cancer     prostate    Cataract     ou done//    CHF (congestive heart failure)     Coronary artery disease     2 stents    Dyslipidemia 02/13/2012    hx increased LFT while on niaspan an  vytorin. Myalgia with lipitor, zocor.     Dyspnea on exertion     stable, chronic    Essential hypertension 02/13/2012    Gout     HEARING LOSS     SEVERE -  No hearing aids anymore    History of myocardial infarction 2000    Hypertension     Nephrolithiasis 1980    passed it on his own    Otitis media     Sleep apnea     use cpap    Status post coronary artery stent placement 06/21/2012                                        Previous PCI:                                                               S/P coated stent to LAD, 07/29/2011                                         S/P coated stent to RCA, 07/29/2011                                         S/P stent to RCA, 11/14/2000             PSHX:  Past Surgical History:   Procedure Laterality Date    CARDIAC CATHETERIZATION      2  stents    CATARACT EXTRACTION W/  INTRAOCULAR LENS IMPLANT Right 11/24/2015    By Dr Byrd    CATARACT EXTRACTION W/  INTRAOCULAR LENS IMPLANT Left 1/12/2016    [facility]    COLONOSCOPY      COLONOSCOPY N/A 1/31/2019    Procedure: COLONOSCOPY;  Surgeon: Evan Basurto MD;  Location: [facility] ENDO;  Service: Endoscopy;  Laterality: N/A;    COLONOSCOPY, SCREENING, HIGH RISK PATIENT N/A 3/25/2025    Procedure: COLONOSCOPY, SCREENING, HIGH RISK PATIENT;  Surgeon: Evan Basurto MD;  Location: [facility] ENDO;  Service: Gastroenterology;  Laterality: N/A;    CORONARY STENT PLACEMENT      x 2    CORONARY STENT PLACEMENT N/A 5/9/2022    Procedure: NEREYDA to the LCX and RCA;  Surgeon: Juliano Borrero MD;  Location: [facility] CATH;  Service: Cardiology;  Laterality: N/A;    LEFT HEART CATHETERIZATION Left 5/9/2022    Procedure: Left heart cath;  Surgeon: Juliano Borrero MD;  Location: [facility] CATH;  Service: Cardiology;  Laterality: Left;    ROBOT-ASSISTED LAPAROSCOPIC PROSTATECTOMY USING DA VIVIEN XI N/A 7/31/2020    Procedure: XI ROBOTIC PROSTATECTOMY;  Surgeon: Ranjit Mckeon MD;  Location: [facility] OR;  Service: Urology;  Laterality: N/A;    ROBOT-ASSISTED LAPAROSCOPIC RETROPERITONEAL LYMPHADENECTOMY USING DA VIVIEN XI N/A 7/31/2020    Procedure: XI ROBOTIC LYMPHADENECTOMY, RETROPERITONEUM;  Surgeon: Ranjit Mckeon MD;  Location: [facility] OR;  Service: Urology;  Laterality: N/A;    TRANSRECTAL BIOPSY OF PROSTATE WITH ULTRASOUND GUIDANCE Bilateral 2/10/2020    Procedure: BIOPSY, PROSTATE, RECTAL APPROACH, WITH US GUIDANCE;  Surgeon: Tavares Thomas MD;  Location: [facility] OR;  Service: Urology;  Laterality: Bilateral;       FMHX:  Family History   Problem Relation Name Age of Onset    Stroke Mother      Kidney disease Mother      Glaucoma Mother      Heart failure Father      Heart disease Father      Hypertension Father      Hyperlipidemia Father      Glaucoma Brother      Diabetes Brother      Heart disease Brother      Hyperlipidemia  "Brother      Prostate cancer Brother      Cancer Sister      Hypertension Son Alfonso     Heart disease Brother      Diabetes Brother      Hyperlipidemia Brother      Heart disease Brother      Hyperlipidemia Brother      Heart disease Brother Alistair     Hyperlipidemia Brother Alistair     Hypertension Brother Alistair     Hypertension Brother Aba     Hyperlipidemia Brother Aba     Heart disease Sister      Hypertension Sister      Hyperlipidemia Sister      Heart disease Sister      Hypertension Sister      Hyperlipidemia Sister      Heart disease Sister      Hypertension Sister      Hyperlipidemia Sister      Prostate cancer Sister      Amblyopia Neg Hx      Blindness Neg Hx      Cataracts Neg Hx      Macular degeneration Neg Hx      Retinal detachment Neg Hx      Strabismus Neg Hx      Thyroid disease Neg Hx         SOCHX:  Social History     Tobacco Use    Smoking status: Never    Smokeless tobacco: Former     Types: Snuff     Quit date: 11/24/2005    Tobacco comments:     dipped tobacco   Substance Use Topics    Alcohol use: No       ALLERGIES:  Niacin, Promethazine, and Statins-hmg-coa reductase inhibitors    CURRENT MEDICATIONS:  Medications Ordered Prior to Encounter[1]    REVIEW OF SYSTEMS:  Review of Systems Complete; Negative, unless noted above.    GENERAL PHYSICAL EXAM:   Ht 5' 10" (1.778 m)   Wt 94.2 kg (207 lb 10.8 oz)   BMI 29.80 kg/m²    GEN: well developed, well nourished, no acute distress   PULM: No wheezing, no respiratory distress   CV: RRR    ORTHO EXAM:   Examination of the left elbow reveals mild edema.  No erythema, drainage, purulence, or any other signs of infection.  Able to flex extend the elbow appropriately.  Presence of a healing wound over the ulnar aspect of the posterior portion of the left elbow.  Sensation is grossly intact.  Capillary refill less than 2 seconds.    RADIOLOGY:   None.    ASSESSMENT:   Left elbow injury with open wound    PLAN:  1. I discussed with Alfonso ARRIAGA"  that he is progressing appropriately in the treatment course.  We discussed the best course of action this time is to perform daily dressing changes and wound washing with clean running water antibacterial soap.  We did discuss the importance of using gauze instead of a Band-Aid for the dressing.  He verbally agreed with the treatment plan.      2. His left elbow was thoroughly cleaned in clinic today with chlorhexidine.  His left elbow was then dressed with 4 x 4 gauze and Coban wrap.      3. I would like to have him follow up in clinic in 1 week for repeat evaluation.  He was instructed to contact the clinic for any problems or concerns.           [1]   Current Outpatient Medications on File Prior to Visit   Medication Sig Dispense Refill    acetaminophen (TYLENOL) 325 MG tablet Take 1 tablet (325 mg total) by mouth every 6 (six) hours as needed for Pain.      allopurinoL (ZYLOPRIM) 100 MG tablet Take 0.5 tablets (50 mg total) by mouth once daily for 7 days, THEN 1 tablet (100 mg total) once daily for 7 days, THEN 2 tablets (200 mg total) once daily for 7 days. Then start the 300 mg prescription. 25 tablet 0    allopurinoL (ZYLOPRIM) 300 MG tablet Take 1 tablet (300 mg total) by mouth once daily. 30 tablet 11    amLODIPine (NORVASC) 5 MG tablet Take 1 tablet (5 mg total) by mouth every evening. 90 tablet 4    aspirin (ECOTRIN) 81 MG EC tablet Take 81 mg by mouth every evening. HOLDING UNTIL INST BY MD      clopidogreL (PLAVIX) 75 mg tablet Take 1 tablet (75 mg total) by mouth once daily. 90 tablet 4    colchicine (COLCRYS) 0.6 mg tablet TAKE 1 TABLET NOW AND 1 AT BEDTIME, THEN 1 DAILY 90 tablet 4    ezetimibe (ZETIA) 10 mg tablet Take 1 tablet (10 mg total) by mouth once daily. 90 tablet 4    imiquimod (ALDARA) 5 % cream AAA nightly M-F for six weeks as directed. 48 packet 1    nitroGLYCERIN (NITROSTAT) 0.4 MG SL tablet Place 1 tablet (0.4 mg total) under the tongue every 5 (five) minutes as needed for Chest  pain. 25 tablet 4    pravastatin (PRAVACHOL) 20 MG tablet Take 1 tablet (20 mg total) by mouth every evening. 90 tablet 4    valsartan-hydrochlorothiazide (DIOVAN-HCT) 320-12.5 mg per tablet Take 1 tablet by mouth once daily. 90 tablet 3     No current facility-administered medications on file prior to visit.

## 2025-04-29 ENCOUNTER — OFFICE VISIT (OUTPATIENT)
Dept: ORTHOPEDICS | Facility: CLINIC | Age: 75
End: 2025-04-29
Payer: MEDICARE

## 2025-04-29 DIAGNOSIS — S51.002A OPEN WOUND OF LEFT ELBOW, INITIAL ENCOUNTER: Primary | ICD-10-CM

## 2025-04-29 PROCEDURE — 1126F AMNT PAIN NOTED NONE PRSNT: CPT | Mod: CPTII,HCNC,S$GLB, | Performed by: PHYSICIAN ASSISTANT

## 2025-04-29 PROCEDURE — 3288F FALL RISK ASSESSMENT DOCD: CPT | Mod: CPTII,HCNC,S$GLB, | Performed by: PHYSICIAN ASSISTANT

## 2025-04-29 PROCEDURE — 99999 PR PBB SHADOW E&M-EST. PATIENT-LVL III: CPT | Mod: PBBFAC,HCNC,, | Performed by: PHYSICIAN ASSISTANT

## 2025-04-29 PROCEDURE — 99213 OFFICE O/P EST LOW 20 MIN: CPT | Mod: HCNC,S$GLB,, | Performed by: PHYSICIAN ASSISTANT

## 2025-04-29 PROCEDURE — 1101F PT FALLS ASSESS-DOCD LE1/YR: CPT | Mod: CPTII,HCNC,S$GLB, | Performed by: PHYSICIAN ASSISTANT

## 2025-04-29 PROCEDURE — 1160F RVW MEDS BY RX/DR IN RCRD: CPT | Mod: CPTII,HCNC,S$GLB, | Performed by: PHYSICIAN ASSISTANT

## 2025-04-29 PROCEDURE — 1159F MED LIST DOCD IN RCRD: CPT | Mod: CPTII,HCNC,S$GLB, | Performed by: PHYSICIAN ASSISTANT

## 2025-04-29 NOTE — PROGRESS NOTES
4/29/2025    HPI:  Alfonso Ewing is a 75 y.o. male, who presents to clinic today for continued evaluation of his left elbow wound.  States he has cleaned and dressed the wound appropriately.  Denies any complaints at this time.    PMHX:  Past Medical History:   Diagnosis Date    Allergy     Anticoagulant long-term use     ASA 81 mg, Plavix    BPH (benign prostatic hyperplasia)     BPH (benign prostatic hypertrophy)     Bradycardia     chronic since 2012    Bradycardia 06/21/2012    CAD (coronary artery disease) 02/13/2012 07/11/2011 mid LAD, 80% stenosis; mid LCX, 60% stenosis; mid RCA, 80%       stenosis;                                                                   11/14/2000 LAD, luminal irregularities; LCX, luminal irregularities;        RCA, occluded;                                                                  Cancer     prostate    Cataract     ou done//    CHF (congestive heart failure)     Coronary artery disease     2 stents    Dyslipidemia 02/13/2012    hx increased LFT while on niaspan an  vytorin. Myalgia with lipitor, zocor.     Dyspnea on exertion     stable, chronic    Essential hypertension 02/13/2012    Gout     HEARING LOSS     SEVERE -  No hearing aids anymore    History of myocardial infarction 2000    Hypertension     Nephrolithiasis 1980    passed it on his own    Otitis media     Sleep apnea     use cpap    Status post coronary artery stent placement 06/21/2012                                        Previous PCI:                                                               S/P coated stent to LAD, 07/29/2011                                         S/P coated stent to RCA, 07/29/2011                                         S/P stent to RCA, 11/14/2000             PSHX:  Past Surgical History:   Procedure Laterality Date    CARDIAC CATHETERIZATION      2 stents    CATARACT EXTRACTION W/  INTRAOCULAR LENS IMPLANT Right 11/24/2015    By Dr Byrd    CATARACT EXTRACTION W/   INTRAOCULAR LENS IMPLANT Left 1/12/2016    [facility]    COLONOSCOPY      COLONOSCOPY N/A 1/31/2019    Procedure: COLONOSCOPY;  Surgeon: Evan Basurto MD;  Location: [facility] ENDO;  Service: Endoscopy;  Laterality: N/A;    COLONOSCOPY, SCREENING, HIGH RISK PATIENT N/A 3/25/2025    Procedure: COLONOSCOPY, SCREENING, HIGH RISK PATIENT;  Surgeon: Evan Basurto MD;  Location: [facility] ENDO;  Service: Gastroenterology;  Laterality: N/A;    CORONARY STENT PLACEMENT      x 2    CORONARY STENT PLACEMENT N/A 5/9/2022    Procedure: NEREYDA to the LCX and RCA;  Surgeon: Juliano Borrero MD;  Location: [facility] CATH;  Service: Cardiology;  Laterality: N/A;    LEFT HEART CATHETERIZATION Left 5/9/2022    Procedure: Left heart cath;  Surgeon: Juliano Borrero MD;  Location: [facility] CATH;  Service: Cardiology;  Laterality: Left;    ROBOT-ASSISTED LAPAROSCOPIC PROSTATECTOMY USING DA VIVIEN XI N/A 7/31/2020    Procedure: XI ROBOTIC PROSTATECTOMY;  Surgeon: Ranjit Mckeon MD;  Location: [facility] OR;  Service: Urology;  Laterality: N/A;    ROBOT-ASSISTED LAPAROSCOPIC RETROPERITONEAL LYMPHADENECTOMY USING DA VIVIEN XI N/A 7/31/2020    Procedure: XI ROBOTIC LYMPHADENECTOMY, RETROPERITONEUM;  Surgeon: Ranjit Mckeon MD;  Location: [facility] OR;  Service: Urology;  Laterality: N/A;    TRANSRECTAL BIOPSY OF PROSTATE WITH ULTRASOUND GUIDANCE Bilateral 2/10/2020    Procedure: BIOPSY, PROSTATE, RECTAL APPROACH, WITH US GUIDANCE;  Surgeon: Tavares Thomas MD;  Location: [facility] OR;  Service: Urology;  Laterality: Bilateral;       FMHX:  Family History   Problem Relation Name Age of Onset    Stroke Mother      Kidney disease Mother      Glaucoma Mother      Heart failure Father      Heart disease Father      Hypertension Father      Hyperlipidemia Father      Glaucoma Brother      Diabetes Brother      Heart disease Brother      Hyperlipidemia Brother      Prostate cancer Brother      Cancer Sister      Hypertension Son Alfonso     Heart disease Brother       Diabetes Brother      Hyperlipidemia Brother      Heart disease Brother      Hyperlipidemia Brother      Heart disease Brother Alistair     Hyperlipidemia Brother Alistair     Hypertension Brother Alistair     Hypertension Brother Aba     Hyperlipidemia Brother Aba     Heart disease Sister      Hypertension Sister      Hyperlipidemia Sister      Heart disease Sister      Hypertension Sister      Hyperlipidemia Sister      Heart disease Sister      Hypertension Sister      Hyperlipidemia Sister      Prostate cancer Sister      Amblyopia Neg Hx      Blindness Neg Hx      Cataracts Neg Hx      Macular degeneration Neg Hx      Retinal detachment Neg Hx      Strabismus Neg Hx      Thyroid disease Neg Hx         SOCHX:  Social History     Tobacco Use    Smoking status: Never    Smokeless tobacco: Former     Types: Snuff     Quit date: 11/24/2005    Tobacco comments:     dipped tobacco   Substance Use Topics    Alcohol use: No       ALLERGIES:  Niacin, Promethazine, and Statins-hmg-coa reductase inhibitors    CURRENT MEDICATIONS:  Medications Ordered Prior to Encounter[1]    REVIEW OF SYSTEMS:  Review of Systems Complete; Negative, unless noted above.    GENERAL PHYSICAL EXAM:   There were no vitals taken for this visit.   GEN: well developed, well nourished, no acute distress   PULM: No wheezing, no respiratory distress   CV: RRR    ORTHO EXAM:   Examination of the left elbow reveals an appropriately healing wound, that has slowly decreasing in size.  No drainage, fluctuance, purulence, erythema, or any signs of infection.  Able to flex extend the elbow appropriately.  Sensation is grossly intact of the left elbow.  Capillary refill less than 2 seconds.    RADIOLOGY:   None.    ASSESSMENT:   Left elbow wound with routine healing    PLAN:  1. I discussed with Alfonso Ewing in his wife that he is progressing appropriately in the treatment course.  We discussed the best course of action this time is to continue with the  daily dressing changes and wound washing with clean running water antibacterial soap.  They verbally agreed with the treatment plan.      2.  I would like him follow up in clinic in 2 weeks for repeat evaluation.  They were instructed to contact the clinic for any problems or concerns in the interim.           [1]   Current Outpatient Medications on File Prior to Visit   Medication Sig Dispense Refill    acetaminophen (TYLENOL) 325 MG tablet Take 1 tablet (325 mg total) by mouth every 6 (six) hours as needed for Pain.      allopurinoL (ZYLOPRIM) 100 MG tablet Take 0.5 tablets (50 mg total) by mouth once daily for 7 days, THEN 1 tablet (100 mg total) once daily for 7 days, THEN 2 tablets (200 mg total) once daily for 7 days. Then start the 300 mg prescription. 25 tablet 0    allopurinoL (ZYLOPRIM) 300 MG tablet Take 1 tablet (300 mg total) by mouth once daily. 30 tablet 11    amLODIPine (NORVASC) 5 MG tablet Take 1 tablet (5 mg total) by mouth every evening. 90 tablet 4    aspirin (ECOTRIN) 81 MG EC tablet Take 81 mg by mouth every evening. HOLDING UNTIL INST BY MD      clopidogreL (PLAVIX) 75 mg tablet Take 1 tablet (75 mg total) by mouth once daily. 90 tablet 4    colchicine (COLCRYS) 0.6 mg tablet TAKE 1 TABLET NOW AND 1 AT BEDTIME, THEN 1 DAILY 90 tablet 4    ezetimibe (ZETIA) 10 mg tablet Take 1 tablet (10 mg total) by mouth once daily. 90 tablet 4    imiquimod (ALDARA) 5 % cream AAA nightly M-F for six weeks as directed. 48 packet 1    nitroGLYCERIN (NITROSTAT) 0.4 MG SL tablet Place 1 tablet (0.4 mg total) under the tongue every 5 (five) minutes as needed for Chest pain. 25 tablet 4    pravastatin (PRAVACHOL) 20 MG tablet Take 1 tablet (20 mg total) by mouth every evening. 90 tablet 4    valsartan-hydrochlorothiazide (DIOVAN-HCT) 320-12.5 mg per tablet Take 1 tablet by mouth once daily. 90 tablet 3     No current facility-administered medications on file prior to visit.

## 2025-05-05 ENCOUNTER — OFFICE VISIT (OUTPATIENT)
Facility: CLINIC | Age: 75
End: 2025-05-05
Payer: MEDICARE

## 2025-05-05 DIAGNOSIS — L73.8 SEBACEOUS HYPERPLASIA: ICD-10-CM

## 2025-05-05 DIAGNOSIS — D22.9 MULTIPLE BENIGN NEVI: Primary | ICD-10-CM

## 2025-05-05 DIAGNOSIS — Z85.828 HISTORY OF NONMELANOMA SKIN CANCER: ICD-10-CM

## 2025-05-05 DIAGNOSIS — D18.01 CHERRY ANGIOMA: ICD-10-CM

## 2025-05-05 DIAGNOSIS — L57.0 AK (ACTINIC KERATOSIS): ICD-10-CM

## 2025-05-05 DIAGNOSIS — Z12.83 SCREENING FOR SKIN CANCER: ICD-10-CM

## 2025-05-05 DIAGNOSIS — L91.8 ACROCHORDON: ICD-10-CM

## 2025-05-05 DIAGNOSIS — L81.4 LENTIGINES: ICD-10-CM

## 2025-05-05 DIAGNOSIS — L82.1 SK (SEBORRHEIC KERATOSIS): ICD-10-CM

## 2025-05-05 PROCEDURE — 1101F PT FALLS ASSESS-DOCD LE1/YR: CPT | Mod: CPTII,HCNC,S$GLB, | Performed by: DERMATOLOGY

## 2025-05-05 PROCEDURE — 99999 PR PBB SHADOW E&M-EST. PATIENT-LVL II: CPT | Mod: PBBFAC,HCNC,, | Performed by: DERMATOLOGY

## 2025-05-05 PROCEDURE — 1159F MED LIST DOCD IN RCRD: CPT | Mod: CPTII,HCNC,S$GLB, | Performed by: DERMATOLOGY

## 2025-05-05 PROCEDURE — 99213 OFFICE O/P EST LOW 20 MIN: CPT | Mod: 25,HCNC,S$GLB, | Performed by: DERMATOLOGY

## 2025-05-05 PROCEDURE — 3288F FALL RISK ASSESSMENT DOCD: CPT | Mod: CPTII,HCNC,S$GLB, | Performed by: DERMATOLOGY

## 2025-05-05 PROCEDURE — 17004 DESTROY PREMAL LESIONS 15/>: CPT | Mod: HCNC,S$GLB,, | Performed by: DERMATOLOGY

## 2025-05-05 NOTE — PROGRESS NOTES
Subjective:      Patient ID:  Alfonso Ewing is a 75 y.o. male who presents for   Chief Complaint   Patient presents with    Skin Check     UBSC     HPI    Established patient.  Here today for upper body skin exam.      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 He is cooperative. No distress.   HENT:   Head: Normocephalic and atraumatic.   Eyes: Lids are normal. Lids are normal.  Right conjunctiva is not injected. Left conjunctiva is not injected. No conjunctival no injection.   Pulmonary/Chest: Effort normal. No respiratory distress.   Neurological: He is alert and oriented to person, place, and time. He is not disoriented.   Psychiatric: He has a normal mood and affect. His speech is normal and behavior is normal. Mood, memory, affect and thought content normal.   Skin:   Areas Examined (abnormalities noted in diagram):   Scalp / Hair Palpated and Inspected  Head / Face Inspection Performed  Neck Inspection Performed  Chest / Axilla Inspection Performed  Abdomen Inspection Performed  Back Inspection Performed  RUE Inspected  LUE Inspection Performed  Nails and Digits Inspection Performed                 Diagram Legend     Erythematous scaling macule/papule c/w actinic keratosis       Vascular papule c/w angioma      Pigmented verrucoid papule/plaque c/w seborrheic keratosis      Yellow umbilicated papule c/w sebaceous hyperplasia      Irregularly shaped tan macule c/w lentigo     1-2 mm smooth white papules consistent with Milia      Movable subcutaneous cyst with punctum c/w epidermal inclusion cyst      Subcutaneous movable cyst c/w pilar cyst      Firm pink to  brown papule c/w dermatofibroma      Pedunculated fleshy papule(s) c/w skin tag(s)      Evenly pigmented macule c/w junctional nevus     Mildly variegated pigmented, slightly irregular-bordered macule c/w mildly atypical nevus      Flesh colored to evenly pigmented papule c/w intradermal nevus       Pink pearly papule/plaque c/w basal cell carcinoma      Erythematous hyperkeratotic cursted plaque c/w SCC      Surgical scar with no sign of skin cancer recurrence      Open and closed comedones      Inflammatory papules and pustules      Verrucoid papule consistent consistent with wart     Erythematous eczematous patches and plaques     Dystrophic onycholytic nail with subungual debris c/w onychomycosis     Umbilicated papule    Erythematous-base heme-crusted tan verrucoid plaque consistent with inflamed seborrheic keratosis     Erythematous Silvery Scaling Plaque c/w Psoriasis     See annotation      Assessment / Plan:      AK (actinic keratosis)  - Discussed diagnosis, etiology, and precancerous nature of condition.   - Cryosurgery Procedure Note: Discussed procedure with patient/patient's guardian including risks and benefits as well as treatment alternatives. Risks of procedure include pain, itching, swelling, redness, blistering, crusting, wound formation, post-inflammatory pigmentary alteration, scar, recurrence. Verbal consent obtained. LN2 cryosurgery performed to 15+ lesion(s). Patient tolerated procedure well. After-visit wound care instructions reviewed and provided in writing.      Multiple benign nevi  - Discussed diagnosis, etiology, and benign-nature of condition.  - Reassured; no lesions suspicious for malignancy noted on exam today.   - Recommended routine self examination of skin. Discussed the ABCDEs of melanoma and ugly duckling sign.   - Recommended daily sun protection, including the use of OTC broad-spectrum sunscreen (SPF 30 or greater) and sun-protective clothing.      Lentigines  - Benign;  reassured treatment not necessary.   - Recommended daily sun protection, including the use of OTC broad-spectrum sunscreen (SPF 30 or greater) and sun-protective clothing.       SK (seborrheic keratosis)  Acrochordon  Cherry angioma  Sebaceous hyperplasia  - Benign; reassured treatment not necessary.      History of nonmelanoma skin cancer  Screening for skin cancer  - Upper body skin examination performed today.  - Findings listed above.   - Sites of prior malignancy examined - no concern for recurrence today.    - Recommended routine self examination of skin.    - Recommended daily sun protection, including the use of OTC broad-spectrum sunscreen (SPF 30 or greater) and sun-protective clothing.      Follow up in about 6 months (around 11/5/2025) for skin check, sooner PRN.

## 2025-05-07 NOTE — PATIENT INSTRUCTIONS
CRYOSURGERY      Your doctor has used a method called cryosurgery to treat your skin condition. Cryosurgery refers to the use of very cold substances to treat a variety of skin conditions such as warts, pre-skin cancers, molluscum contagiosum, sun spots, and several benign growths. The substance we use in cryosurgery is liquid nitrogen and is so cold (-195 degrees Celsius) that is burns when administered.     Following treatment in the office, the skin may immediately burn and become red. You may find the area around the lesion is affected as well. It is sometimes necessary to treat not only the lesion, but a small area of the surrounding normal skin to achieve a good response.     A blister, and even a blood filled blister, may form after treatment.   This is a normal response. If the blister is painful, it is acceptable to sterilize a needle and with rubbing alcohol and gently pop the blister. It is important that you gently wash the area with soap and warm water as the blister fluid may contain wart virus if a wart was treated. Do no remove the roof of the blister.     The area treated can take anywhere from 1-3 weeks to heal. Healing time depends on the kind skin lesion treated, the location, and how aggressively the lesion was treated. It is recommended that the areas treated are covered with Vaseline or bacitracin ointment and a band-aid. If a band-aid is not practical, just ointment applied several times per day will do. Keeping these areas moist will speed the healing time.    Treatment with liquid nitrogen can leave a scar. In dark skin, it may be a light or dark scar, in light skin it may be a white or pink scar. These will generally fade with time, but may never go away completely.     If you have any concerns after your treatment, please feel free to call the office.       [address] [place] [address]/ [phone] FAX/ [email or web address] What Are the Symptoms of Skin  Cancer?  A change in your skin is the most common sign of skin cancer. This could be a new growth, a sore that doesnt heal, or a change in a mole. Not all skin cancers look the same.    For melanoma specifically, a simple way to remember the warning signs is to remember the A-B-C-D-Es of melanoma--    A stands for asymmetrical. Does the mole or spot have an irregular shape with two parts that look very different?  B stands for border. Is the border irregular or jagged?  C is for color. Is the color uneven?  D is for diameter. Is the mole or spot larger than the size of a pea?  E is for evolving. Has the mole or spot changed during the past few weeks or months?    Talk to your doctor if you notice changes in your skin such as a new growth, a sore that doesnt heal, a change in an old growth, or any of the A-B-C-D-Es of melanoma    What Can I Do to Reduce My Risk of Skin Cancer?  Protection from ultraviolet (UV) radiation is important all year, not just during the summer or at the beach. UV rays from the sun can reach you on cloudy and hazy days, not just on bright and kimberly days. UV rays also reflect off of surfaces like water, cement, sand, and snow. Indoor tanning (using a tanning bed, marroquin, or sunlamp to get tan) exposes users to UV radiation.    The hours between 10 a.m. and 4 p.m. Daylight Saving Time (9 a.m. to 3 p.m. standard time) are the most hazardous for UV exposure outdoors in the continental [place]. UV rays from sunlight are the greatest during the late spring and early summer in [place].    CDC recommends easy options for protection from UV radiation--    Stay in the shade or indoors, especially during midday hours.  Wear clothing that covers your arms and legs.  Wear a hat with a wide brim to shade your face, head, ears, and neck.  Wear sunglasses that wrap around and block both UVA and UVB rays.  Use sunscreen with a sun protection factor (SPF) of 30 or higher, and both UVA  and UVB (broad spectrum) protection.  Avoid indoor tanning.    Adapted from https://www.cdc.gov/cancer/skin/basic_info/

## 2025-05-13 ENCOUNTER — OFFICE VISIT (OUTPATIENT)
Dept: ORTHOPEDICS | Facility: CLINIC | Age: 75
End: 2025-05-13
Payer: MEDICARE

## 2025-05-13 DIAGNOSIS — S51.002D OPEN WOUND OF LEFT ELBOW, SUBSEQUENT ENCOUNTER: Primary | ICD-10-CM

## 2025-05-13 PROCEDURE — 99999 PR PBB SHADOW E&M-EST. PATIENT-LVL III: CPT | Mod: PBBFAC,HCNC,, | Performed by: PHYSICIAN ASSISTANT

## 2025-05-13 PROCEDURE — 1126F AMNT PAIN NOTED NONE PRSNT: CPT | Mod: CPTII,HCNC,S$GLB, | Performed by: PHYSICIAN ASSISTANT

## 2025-05-13 PROCEDURE — 3288F FALL RISK ASSESSMENT DOCD: CPT | Mod: CPTII,HCNC,S$GLB, | Performed by: PHYSICIAN ASSISTANT

## 2025-05-13 PROCEDURE — 1160F RVW MEDS BY RX/DR IN RCRD: CPT | Mod: CPTII,HCNC,S$GLB, | Performed by: PHYSICIAN ASSISTANT

## 2025-05-13 PROCEDURE — 1101F PT FALLS ASSESS-DOCD LE1/YR: CPT | Mod: CPTII,HCNC,S$GLB, | Performed by: PHYSICIAN ASSISTANT

## 2025-05-13 PROCEDURE — 1159F MED LIST DOCD IN RCRD: CPT | Mod: CPTII,HCNC,S$GLB, | Performed by: PHYSICIAN ASSISTANT

## 2025-05-13 PROCEDURE — 99213 OFFICE O/P EST LOW 20 MIN: CPT | Mod: HCNC,S$GLB,, | Performed by: PHYSICIAN ASSISTANT

## 2025-05-13 NOTE — PROGRESS NOTES
5/13/2025    HPI:  Alfonso Ewing is a 75 y.o. male, who presents to clinic today for continued evaluation of his left elbow wound.  States he has performed the daily dressing changes as instructed.  States overall he is doing well.  States the wound continues to heal appropriately.  Denies any other complaints at this time.    PMHX:  Past Medical History:   Diagnosis Date    Allergy     Anticoagulant long-term use     ASA 81 mg, Plavix    BPH (benign prostatic hyperplasia)     BPH (benign prostatic hypertrophy)     Bradycardia     chronic since 2012    Bradycardia 06/21/2012    CAD (coronary artery disease) 02/13/2012 07/11/2011 mid LAD, 80% stenosis; mid LCX, 60% stenosis; mid RCA, 80%       stenosis;                                                                   11/14/2000 LAD, luminal irregularities; LCX, luminal irregularities;        RCA, occluded;                                                                  Cancer     prostate    Cataract     ou done//    CHF (congestive heart failure)     Coronary artery disease     2 stents    Dyslipidemia 02/13/2012    hx increased LFT while on niaspan an  vytorin. Myalgia with lipitor, zocor.     Dyspnea on exertion     stable, chronic    Essential hypertension 02/13/2012    Gout     HEARING LOSS     SEVERE -  No hearing aids anymore    History of myocardial infarction 2000    Hypertension     Nephrolithiasis 1980    passed it on his own    Otitis media     Sleep apnea     use cpap    Status post coronary artery stent placement 06/21/2012                                        Previous PCI:                                                               S/P coated stent to LAD, 07/29/2011                                         S/P coated stent to RCA, 07/29/2011                                         S/P stent to RCA, 11/14/2000             PSHX:  Past Surgical History:   Procedure Laterality Date    CARDIAC CATHETERIZATION      2 stents    CATARACT EXTRACTION W/   INTRAOCULAR LENS IMPLANT Right 11/24/2015    By Dr Byrd    CATARACT EXTRACTION W/  INTRAOCULAR LENS IMPLANT Left 1/12/2016    [facility]    COLONOSCOPY      COLONOSCOPY N/A 1/31/2019    Procedure: COLONOSCOPY;  Surgeon: Evan Basurto MD;  [place] [facility];  Service: Endoscopy;  Laterality: N/A;    COLONOSCOPY, SCREENING, HIGH RISK PATIENT N/A 3/25/2025    Procedure: COLONOSCOPY, SCREENING, HIGH RISK PATIENT;  Surgeon: Evan Basurto MD;  [place] [facility];  Service: Gastroenterology;  Laterality: N/A;    CORONARY STENT PLACEMENT      x 2    CORONARY STENT PLACEMENT N/A 5/9/2022    Procedure: NEREYDA to the LCX and RCA;  Surgeon: Juliano Borrero MD;  Location: [facility] CATH;  Service: Cardiology;  Laterality: N/A;    LEFT HEART CATHETERIZATION Left 5/9/2022    Procedure: Left heart cath;  Surgeon: Juliano Borrero MD;  Location: [facility] CATH;  Service: Cardiology;  Laterality: Left;    ROBOT-ASSISTED LAPAROSCOPIC PROSTATECTOMY USING DA VIVIEN XI N/A 7/31/2020    Procedure: XI ROBOTIC PROSTATECTOMY;  Surgeon: Ranjit Mckeon MD;  Location: [facility] OR;  Service: Urology;  Laterality: N/A;    ROBOT-ASSISTED LAPAROSCOPIC RETROPERITONEAL LYMPHADENECTOMY USING DA VIVIEN XI N/A 7/31/2020    Procedure: XI ROBOTIC LYMPHADENECTOMY, RETROPERITONEUM;  Surgeon: Ranjit Mckeon MD;  Location: [facility] OR;  Service: Urology;  Laterality: N/A;    TRANSRECTAL BIOPSY OF PROSTATE WITH ULTRASOUND GUIDANCE Bilateral 2/10/2020    Procedure: BIOPSY, PROSTATE, RECTAL APPROACH, WITH US GUIDANCE;  Surgeon: Tavares Thomas MD;  [place] [facility] [place];  Service: Urology;  Laterality: Bilateral;       FMHX:  Family History   Problem Relation Name Age of Onset    Stroke Mother      Kidney disease Mother      Glaucoma Mother      Heart failure Father      Heart disease Father      Hypertension Father      Hyperlipidemia Father      Glaucoma Brother      Diabetes Brother      Heart disease Brother      Hyperlipidemia Brother      Prostate cancer  Brother      Cancer Sister      Hypertension Son Alfonso     Heart disease Brother      Diabetes Brother      Hyperlipidemia Brother      Heart disease Brother      Hyperlipidemia Brother      Heart disease Brother Alistair     Hyperlipidemia Brother Alistair     Hypertension Brother Alistair     Hypertension Brother Aba     Hyperlipidemia Brother Aba     Heart disease Sister      Hypertension Sister      Hyperlipidemia Sister      Heart disease Sister      Hypertension Sister      Hyperlipidemia Sister      Heart disease Sister      Hypertension Sister      Hyperlipidemia Sister      Prostate cancer Sister      Amblyopia Neg Hx      Blindness Neg Hx      Cataracts Neg Hx      Macular degeneration Neg Hx      Retinal detachment Neg Hx      Strabismus Neg Hx      Thyroid disease Neg Hx         SOCHX:  Social History     Tobacco Use    Smoking status: Never    Smokeless tobacco: Former     Types: Snuff     Quit date: 11/24/2005    Tobacco comments:     dipped tobacco   Substance Use Topics    Alcohol use: No       ALLERGIES:  Niacin, Promethazine, and Statins-hmg-coa reductase inhibitors    CURRENT MEDICATIONS:  Medications Ordered Prior to Encounter[1]    REVIEW OF SYSTEMS:  Review of Systems Complete; Negative, unless noted above.    GENERAL PHYSICAL EXAM:   There were no vitals taken for this visit.   GEN: well developed, well nourished, no acute distress   PULM: No wheezing, no respiratory distress   CV: RRR    ORTHO EXAM:   Examination of the left elbow reveals an appropriately healing wound that continues to have appropriate eschar formation noted.  No drainage, erythema, fluctuance, purulence, or any signs of infection.  Able to flex extend elbow appropriately.  Sensation is grossly intact distally.  Capillary refill less than 2 seconds.    RADIOLOGY:   None.    ASSESSMENT:   Left elbow wound with routine healing    PLAN:  1. I discussed with Alfonso Ewing that he is progressing appropriately in the treatment  course.  We discussed the best course of action this time is to begin to do with his daily dressing changes and wound washing.  He verbally agreed with the treatment plan.      2.  I would like him follow up in clinic in 2 weeks for repeat evaluation.  He was instructed to contact the clinic for any problems or concerns in the interim.           [1]   Current Outpatient Medications on File Prior to Visit   Medication Sig Dispense Refill    acetaminophen (TYLENOL) 325 MG tablet Take 1 tablet (325 mg total) by mouth every 6 (six) hours as needed for Pain.      allopurinoL (ZYLOPRIM) 300 MG tablet Take 1 tablet (300 mg total) by mouth once daily. 30 tablet 11    amLODIPine (NORVASC) 5 MG tablet Take 1 tablet (5 mg total) by mouth every evening. 90 tablet 4    aspirin (ECOTRIN) 81 MG EC tablet Take 81 mg by mouth every evening. HOLDING UNTIL INST BY MD      clopidogreL (PLAVIX) 75 mg tablet Take 1 tablet (75 mg total) by mouth once daily. 90 tablet 4    colchicine (COLCRYS) 0.6 mg tablet TAKE 1 TABLET NOW AND 1 AT BEDTIME, THEN 1 DAILY 90 tablet 4    ezetimibe (ZETIA) 10 mg tablet Take 1 tablet (10 mg total) by mouth once daily. 90 tablet 4    imiquimod (ALDARA) 5 % cream AAA nightly M-F for six weeks as directed. 48 packet 1    nitroGLYCERIN (NITROSTAT) 0.4 MG SL tablet Place 1 tablet (0.4 mg total) under the tongue every 5 (five) minutes as needed for Chest pain. 25 tablet 4    pravastatin (PRAVACHOL) 20 MG tablet Take 1 tablet (20 mg total) by mouth every evening. 90 tablet 4    valsartan-hydrochlorothiazide (DIOVAN-HCT) 320-12.5 mg per tablet Take 1 tablet by mouth once daily. 90 tablet 3    [] allopurinoL (ZYLOPRIM) 100 MG tablet Take 0.5 tablets (50 mg total) by mouth once daily for 7 days, THEN 1 tablet (100 mg total) once daily for 7 days, THEN 2 tablets (200 mg total) once daily for 7 days. Then start the 300 mg prescription. 25 tablet 0     No current facility-administered medications on file prior to  visit.

## 2025-05-27 ENCOUNTER — OFFICE VISIT (OUTPATIENT)
Dept: ORTHOPEDICS | Facility: CLINIC | Age: 75
End: 2025-05-27
Payer: MEDICARE

## 2025-05-27 DIAGNOSIS — S51.002D OPEN WOUND OF LEFT ELBOW, SUBSEQUENT ENCOUNTER: Primary | ICD-10-CM

## 2025-05-27 PROCEDURE — 1159F MED LIST DOCD IN RCRD: CPT | Mod: CPTII,HCNC,S$GLB, | Performed by: PHYSICIAN ASSISTANT

## 2025-05-27 PROCEDURE — 1160F RVW MEDS BY RX/DR IN RCRD: CPT | Mod: CPTII,HCNC,S$GLB, | Performed by: PHYSICIAN ASSISTANT

## 2025-05-27 PROCEDURE — 99213 OFFICE O/P EST LOW 20 MIN: CPT | Mod: HCNC,S$GLB,, | Performed by: PHYSICIAN ASSISTANT

## 2025-05-27 PROCEDURE — 99999 PR PBB SHADOW E&M-EST. PATIENT-LVL II: CPT | Mod: PBBFAC,HCNC,, | Performed by: PHYSICIAN ASSISTANT

## 2025-05-27 NOTE — PROGRESS NOTES
5/27/2025    HPI:  Alfonso Ewing is a 75 y.o. male, who presents to clinic today for continued evaluation of his left elbow wound.  States he has kept it dry and cleaned as instructed.  States overall he is doing well.  States continues to heal appropriately.  Denies any other complaints this time.    PMHX:  Past Medical History:   Diagnosis Date    Allergy     Anticoagulant long-term use     ASA 81 mg, Plavix    BPH (benign prostatic hyperplasia)     BPH (benign prostatic hypertrophy)     Bradycardia     chronic since 2012    Bradycardia 06/21/2012    CAD (coronary artery disease) 02/13/2012 07/11/2011 mid LAD, 80% stenosis; mid LCX, 60% stenosis; mid RCA, 80%       stenosis;                                                                   11/14/2000 LAD, luminal irregularities; LCX, luminal irregularities;        RCA, occluded;                                                                  Cancer     prostate    Cataract     ou done//    CHF (congestive heart failure)     Coronary artery disease     2 stents    Dyslipidemia 02/13/2012    hx increased LFT while on niaspan an  vytorin. Myalgia with lipitor, zocor.     Dyspnea on exertion     stable, chronic    Essential hypertension 02/13/2012    Gout     HEARING LOSS     SEVERE -  No hearing aids anymore    History of myocardial infarction 2000    Hypertension     Nephrolithiasis 1980    passed it on his own    Otitis media     Sleep apnea     use cpap    Status post coronary artery stent placement 06/21/2012                                        Previous PCI:                                                               S/P coated stent to LAD, 07/29/2011                                         S/P coated stent to RCA, 07/29/2011                                         S/P stent to RCA, 11/14/2000             PSHX:  Past Surgical History:   Procedure Laterality Date    CARDIAC CATHETERIZATION      2 stents    CATARACT EXTRACTION W/  INTRAOCULAR LENS IMPLANT  Right 11/24/2015    By Dr Byrd    CATARACT EXTRACTION W/  INTRAOCULAR LENS IMPLANT Left 1/12/2016    [facility]    COLONOSCOPY      COLONOSCOPY N/A 1/31/2019    Procedure: COLONOSCOPY;  Surgeon: Evan Basurto MD;  Location: [facility] ENDO;  Service: Endoscopy;  Laterality: N/A;    COLONOSCOPY, SCREENING, HIGH RISK PATIENT N/A 3/25/2025    Procedure: COLONOSCOPY, SCREENING, HIGH RISK PATIENT;  Surgeon: Evan Basurto MD;  Location: [facility] ENDO;  Service: Gastroenterology;  Laterality: N/A;    CORONARY STENT PLACEMENT      x 2    CORONARY STENT PLACEMENT N/A 5/9/2022    Procedure: NEREYDA to the LCX and RCA;  Surgeon: Juliano Borrero MD;  Location: [facility] CATH;  Service: Cardiology;  Laterality: N/A;    LEFT HEART CATHETERIZATION Left 5/9/2022    Procedure: Left heart cath;  Surgeon: Juliano Borrero MD;  Location: [facility] CATH;  Service: Cardiology;  Laterality: Left;    ROBOT-ASSISTED LAPAROSCOPIC PROSTATECTOMY USING DA VIVIEN XI N/A 7/31/2020    Procedure: XI ROBOTIC PROSTATECTOMY;  Surgeon: Ranjit Mckeon MD;  Location: [facility] OR;  Service: Urology;  Laterality: N/A;    ROBOT-ASSISTED LAPAROSCOPIC RETROPERITONEAL LYMPHADENECTOMY USING DA VIVIEN XI N/A 7/31/2020    Procedure: XI ROBOTIC LYMPHADENECTOMY, RETROPERITONEUM;  Surgeon: Ranjit Mckeon MD;  Location: [facility] OR;  Service: Urology;  Laterality: N/A;    TRANSRECTAL BIOPSY OF PROSTATE WITH ULTRASOUND GUIDANCE Bilateral 2/10/2020    Procedure: BIOPSY, PROSTATE, RECTAL APPROACH, WITH US GUIDANCE;  Surgeon: Tavares Thomas MD;  Location: [facility] OR;  Service: Urology;  Laterality: Bilateral;       FMHX:  Family History   Problem Relation Name Age of Onset    Stroke Mother      Kidney disease Mother      Glaucoma Mother      Heart failure Father      Heart disease Father      Hypertension Father      Hyperlipidemia Father      Glaucoma Brother      Diabetes Brother      Heart disease Brother      Hyperlipidemia Brother      Prostate cancer Brother      Cancer Sister       Hypertension Son Alfonso     Heart disease Brother      Diabetes Brother      Hyperlipidemia Brother      Heart disease Brother      Hyperlipidemia Brother      Heart disease Brother Alistair     Hyperlipidemia Brother Alistair     Hypertension Brother Alistair     Hypertension Brother Aba     Hyperlipidemia Brother Aba     Heart disease Sister      Hypertension Sister      Hyperlipidemia Sister      Heart disease Sister      Hypertension Sister      Hyperlipidemia Sister      Heart disease Sister      Hypertension Sister      Hyperlipidemia Sister      Prostate cancer Sister      Amblyopia Neg Hx      Blindness Neg Hx      Cataracts Neg Hx      Macular degeneration Neg Hx      Retinal detachment Neg Hx      Strabismus Neg Hx      Thyroid disease Neg Hx         SOCHX:  Social History     Tobacco Use    Smoking status: Never    Smokeless tobacco: Former     Types: Snuff     Quit date: 11/24/2005    Tobacco comments:     dipped tobacco   Substance Use Topics    Alcohol use: No       ALLERGIES:  Niacin, Promethazine, and Statins-hmg-coa reductase inhibitors    CURRENT MEDICATIONS:  Medications Ordered Prior to Encounter[1]    REVIEW OF SYSTEMS:  Review of Systems Complete; Negative, unless noted above.    GENERAL PHYSICAL EXAM:   There were no vitals taken for this visit.   GEN: well developed, well nourished, no acute distress   PULM: No wheezing, no respiratory distress   CV: RRR    ORTHO EXAM:   Examination of the left elbow reveals an appropriately healing wound over the posterior medial aspect of the left elbow.  Presence of appropriate eschar formation noted.  No erythema, ecchymosis, fluctuance, purulence, or any wound complications.  Full intact range of motion left elbow.  Normal sensation in the radial, ulnar, median nerve distributions.  Capillary refill less than 2 seconds.    RADIOLOGY:   None.    ASSESSMENT:   Left elbow wound with routine healing    PLAN:  1. I discussed with Alfonso Ewing that he is  progressing appropriately in the treatment course.  We discussed the best course of action this time is to closely monitor the wound as it finishes healing.  We did discuss for any wound complications or concerns to contact the clinic for reappointment.  He verbally agreed with the treatment plan.      2.  I would like him follow up in clinic on a PRN basis.  He was instructed to contact the clinic for any problems or concerns in the interim.           [1]   Current Outpatient Medications on File Prior to Visit   Medication Sig Dispense Refill    acetaminophen (TYLENOL) 325 MG tablet Take 1 tablet (325 mg total) by mouth every 6 (six) hours as needed for Pain.      allopurinoL (ZYLOPRIM) 300 MG tablet Take 1 tablet (300 mg total) by mouth once daily. 30 tablet 11    amLODIPine (NORVASC) 5 MG tablet Take 1 tablet (5 mg total) by mouth every evening. 90 tablet 4    aspirin (ECOTRIN) 81 MG EC tablet Take 81 mg by mouth every evening. HOLDING UNTIL INST BY MD      clopidogreL (PLAVIX) 75 mg tablet Take 1 tablet (75 mg total) by mouth once daily. 90 tablet 4    colchicine (COLCRYS) 0.6 mg tablet TAKE 1 TABLET NOW AND 1 AT BEDTIME, THEN 1 DAILY 90 tablet 4    ezetimibe (ZETIA) 10 mg tablet Take 1 tablet (10 mg total) by mouth once daily. 90 tablet 4    imiquimod (ALDARA) 5 % cream AAA nightly M-F for six weeks as directed. 48 packet 1    nitroGLYCERIN (NITROSTAT) 0.4 MG SL tablet Place 1 tablet (0.4 mg total) under the tongue every 5 (five) minutes as needed for Chest pain. 25 tablet 4    pravastatin (PRAVACHOL) 20 MG tablet Take 1 tablet (20 mg total) by mouth every evening. 90 tablet 4    valsartan-hydrochlorothiazide (DIOVAN-HCT) 320-12.5 mg per tablet Take 1 tablet by mouth once daily. 90 tablet 3     No current facility-administered medications on file prior to visit.

## 2025-07-01 ENCOUNTER — LAB VISIT (OUTPATIENT)
Dept: LAB | Facility: HOSPITAL | Age: 75
End: 2025-07-01
Attending: INTERNAL MEDICINE
Payer: MEDICARE

## 2025-07-01 DIAGNOSIS — M10.00 IDIOPATHIC GOUT, UNSPECIFIED CHRONICITY, UNSPECIFIED SITE: ICD-10-CM

## 2025-07-01 DIAGNOSIS — I10 ESSENTIAL HYPERTENSION: ICD-10-CM

## 2025-07-01 DIAGNOSIS — Z85.46 HISTORY OF PROSTATE CANCER: ICD-10-CM

## 2025-07-01 DIAGNOSIS — D64.9 NORMOCYTIC ANEMIA: ICD-10-CM

## 2025-07-01 DIAGNOSIS — E78.2 MIXED HYPERLIPIDEMIA: ICD-10-CM

## 2025-07-01 DIAGNOSIS — R73.03 PREDIABETES: ICD-10-CM

## 2025-07-01 LAB
ABSOLUTE EOSINOPHIL (OHS): 0.11 K/UL
ABSOLUTE MONOCYTE (OHS): 0.53 K/UL (ref 0.3–1)
ABSOLUTE NEUTROPHIL COUNT (OHS): 4.16 K/UL (ref 1.8–7.7)
ALBUMIN SERPL BCP-MCNC: 4.1 G/DL (ref 3.5–5.2)
ALP SERPL-CCNC: 40 UNIT/L (ref 40–150)
ALT SERPL W/O P-5'-P-CCNC: 23 UNIT/L (ref 10–44)
ANION GAP (OHS): 8 MMOL/L (ref 8–16)
AST SERPL-CCNC: 22 UNIT/L (ref 11–45)
BASOPHILS # BLD AUTO: 0.03 K/UL
BASOPHILS NFR BLD AUTO: 0.5 %
BILIRUB SERPL-MCNC: 0.7 MG/DL (ref 0.1–1)
BUN SERPL-MCNC: 30 MG/DL (ref 8–23)
CALCIUM SERPL-MCNC: 9.4 MG/DL (ref 8.7–10.5)
CHLORIDE SERPL-SCNC: 104 MMOL/L (ref 95–110)
CHOLEST SERPL-MCNC: 132 MG/DL (ref 120–199)
CHOLEST/HDLC SERPL: 3.8 {RATIO} (ref 2–5)
CO2 SERPL-SCNC: 27 MMOL/L (ref 23–29)
CREAT SERPL-MCNC: 1.6 MG/DL (ref 0.5–1.4)
EAG (OHS): 114 MG/DL (ref 68–131)
ERYTHROCYTE [DISTWIDTH] IN BLOOD BY AUTOMATED COUNT: 13.8 % (ref 11.5–14.5)
GFR SERPLBLD CREATININE-BSD FMLA CKD-EPI: 44 ML/MIN/1.73/M2
GLUCOSE SERPL-MCNC: 101 MG/DL (ref 70–110)
HBA1C MFR BLD: 5.6 % (ref 4–5.6)
HCT VFR BLD AUTO: 41.3 % (ref 40–54)
HDLC SERPL-MCNC: 35 MG/DL (ref 40–75)
HDLC SERPL: 26.5 % (ref 20–50)
HGB BLD-MCNC: 13.9 GM/DL (ref 14–18)
IMM GRANULOCYTES # BLD AUTO: 0.01 K/UL (ref 0–0.04)
IMM GRANULOCYTES NFR BLD AUTO: 0.2 % (ref 0–0.5)
LDLC SERPL CALC-MCNC: 60.6 MG/DL (ref 63–159)
LYMPHOCYTES # BLD AUTO: 1.28 K/UL (ref 1–4.8)
MCH RBC QN AUTO: 31.1 PG (ref 27–31)
MCHC RBC AUTO-ENTMCNC: 33.7 G/DL (ref 32–36)
MCV RBC AUTO: 92 FL (ref 82–98)
NONHDLC SERPL-MCNC: 97 MG/DL
NUCLEATED RBC (/100WBC) (OHS): 0 /100 WBC
PLATELET # BLD AUTO: 201 K/UL (ref 150–450)
PMV BLD AUTO: 12.3 FL (ref 9.2–12.9)
POTASSIUM SERPL-SCNC: 3.9 MMOL/L (ref 3.5–5.1)
PROT SERPL-MCNC: 6.8 GM/DL (ref 6–8.4)
PSA SERPL-MCNC: <0.01 NG/ML
RBC # BLD AUTO: 4.47 M/UL (ref 4.6–6.2)
RELATIVE EOSINOPHIL (OHS): 1.8 %
RELATIVE LYMPHOCYTE (OHS): 20.9 % (ref 18–48)
RELATIVE MONOCYTE (OHS): 8.7 % (ref 4–15)
RELATIVE NEUTROPHIL (OHS): 67.9 % (ref 38–73)
SODIUM SERPL-SCNC: 139 MMOL/L (ref 136–145)
TRIGL SERPL-MCNC: 182 MG/DL (ref 30–150)
URATE SERPL-MCNC: 5.2 MG/DL (ref 3.4–7)
WBC # BLD AUTO: 6.12 K/UL (ref 3.9–12.7)

## 2025-07-01 PROCEDURE — 80061 LIPID PANEL: CPT | Mod: HCNC

## 2025-07-01 PROCEDURE — 85025 COMPLETE CBC W/AUTO DIFF WBC: CPT | Mod: HCNC

## 2025-07-01 PROCEDURE — 84153 ASSAY OF PSA TOTAL: CPT | Mod: HCNC

## 2025-07-01 PROCEDURE — 36415 COLL VENOUS BLD VENIPUNCTURE: CPT | Mod: HCNC,PO

## 2025-07-01 PROCEDURE — 82040 ASSAY OF SERUM ALBUMIN: CPT | Mod: HCNC

## 2025-07-01 PROCEDURE — 83036 HEMOGLOBIN GLYCOSYLATED A1C: CPT | Mod: HCNC

## 2025-07-01 PROCEDURE — 84550 ASSAY OF BLOOD/URIC ACID: CPT | Mod: HCNC

## 2025-07-08 ENCOUNTER — OFFICE VISIT (OUTPATIENT)
Dept: FAMILY MEDICINE | Facility: CLINIC | Age: 75
End: 2025-07-08
Payer: MEDICARE

## 2025-07-08 VITALS
BODY MASS INDEX: 29.1 KG/M2 | DIASTOLIC BLOOD PRESSURE: 74 MMHG | HEART RATE: 51 BPM | SYSTOLIC BLOOD PRESSURE: 132 MMHG | OXYGEN SATURATION: 96 % | WEIGHT: 202.81 LBS

## 2025-07-08 DIAGNOSIS — I10 ESSENTIAL HYPERTENSION: ICD-10-CM

## 2025-07-08 DIAGNOSIS — M10.9 GOUT, UNSPECIFIED CAUSE, UNSPECIFIED CHRONICITY, UNSPECIFIED SITE: ICD-10-CM

## 2025-07-08 DIAGNOSIS — E78.2 MIXED HYPERLIPIDEMIA: ICD-10-CM

## 2025-07-08 DIAGNOSIS — N18.32 STAGE 3B CHRONIC KIDNEY DISEASE: ICD-10-CM

## 2025-07-08 DIAGNOSIS — Z85.46 HISTORY OF PROSTATE CANCER: ICD-10-CM

## 2025-07-08 DIAGNOSIS — Z00.00 ROUTINE PHYSICAL EXAMINATION: Primary | ICD-10-CM

## 2025-07-08 DIAGNOSIS — N52.9 ERECTILE DYSFUNCTION, UNSPECIFIED ERECTILE DYSFUNCTION TYPE: ICD-10-CM

## 2025-07-08 DIAGNOSIS — R73.03 PREDIABETES: ICD-10-CM

## 2025-07-08 PROCEDURE — 3075F SYST BP GE 130 - 139MM HG: CPT | Mod: CPTII,HCNC,S$GLB, | Performed by: INTERNAL MEDICINE

## 2025-07-08 PROCEDURE — 99397 PER PM REEVAL EST PAT 65+ YR: CPT | Mod: HCNC,S$GLB,, | Performed by: INTERNAL MEDICINE

## 2025-07-08 PROCEDURE — 1159F MED LIST DOCD IN RCRD: CPT | Mod: CPTII,HCNC,S$GLB, | Performed by: INTERNAL MEDICINE

## 2025-07-08 PROCEDURE — 3288F FALL RISK ASSESSMENT DOCD: CPT | Mod: CPTII,HCNC,S$GLB, | Performed by: INTERNAL MEDICINE

## 2025-07-08 PROCEDURE — 99999 PR PBB SHADOW E&M-EST. PATIENT-LVL III: CPT | Mod: PBBFAC,HCNC,, | Performed by: INTERNAL MEDICINE

## 2025-07-08 PROCEDURE — 99214 OFFICE O/P EST MOD 30 MIN: CPT | Mod: 25,HCNC,S$GLB, | Performed by: INTERNAL MEDICINE

## 2025-07-08 PROCEDURE — 1100F PTFALLS ASSESS-DOCD GE2>/YR: CPT | Mod: CPTII,HCNC,S$GLB, | Performed by: INTERNAL MEDICINE

## 2025-07-08 PROCEDURE — 3078F DIAST BP <80 MM HG: CPT | Mod: CPTII,HCNC,S$GLB, | Performed by: INTERNAL MEDICINE

## 2025-07-08 PROCEDURE — 1160F RVW MEDS BY RX/DR IN RCRD: CPT | Mod: CPTII,HCNC,S$GLB, | Performed by: INTERNAL MEDICINE

## 2025-07-08 RX ORDER — TADALAFIL 20 MG/1
20 TABLET ORAL DAILY
Qty: 10 TABLET | Refills: 11 | Status: SHIPPED | OUTPATIENT
Start: 2025-07-08 | End: 2026-07-08

## 2025-07-08 NOTE — PROGRESS NOTES
Subjective:       Patient ID: Alfonso Ewing is a 75 y.o. male.  Chief Complaint: Annual Exam     HPI    Here for routine health maintenance.          Gout - controlled.  UA wnl.  + crystals in knee aspiration.  Allopurinol and prn colchicine     CAD - failed repeat stress test, which led to 2 more stents 5/2022.  Hx MI s/p stents x2 prior.  Dr Borrero   Bradycardia - being evaluated by cardiology.  No LOC, lightheadedness, weakness     HTN - controlled.      HLD - controlled goal < 70 LDL on Zetia (CAN) and 40 mg pravastatin.  Previous high dose statin caused muscle pain.   Transaminitis - corrected; card dec pravastatin back down to 20 mg and wants recheck in 4 mo     CKD IIIa - mild;   Does not take NSAIDS     Prostate cancer s/p surgical removal 2020.  PSA undetectable       ED - controlled with Viagra, but feels bad for 3 days after.  Wants try Cialis     preDM - stable  High triglycerides - uncontrolled  High OJ and Sweet tea       Assessment:       1. Routine physical examination    2. Essential hypertension    3. Mixed hyperlipidemia    4. Prediabetes    5. Stage 3b chronic kidney disease    6. Gout, unspecified cause, unspecified chronicity, unspecified site    7. Erectile dysfunction, unspecified erectile dysfunction type    8. History of prostate cancer        Plan:       Routine physical examination    Essential hypertension  -     Comprehensive Metabolic Panel; Future; Expected date: 01/04/2026    Mixed hyperlipidemia  -     Comprehensive Metabolic Panel; Future; Expected date: 01/04/2026    Prediabetes  -     Hemoglobin A1C; Future; Expected date: 01/04/2026    Stage 3b chronic kidney disease    Gout, unspecified cause, unspecified chronicity, unspecified site  -     Uric Acid; Future; Expected date: 01/04/2026    Erectile dysfunction, unspecified erectile dysfunction type  -     tadalafiL (CIALIS) 20 MG Tab; Take 1 tablet (20 mg total) by mouth once daily.  Dispense: 10 tablet; Refill: 11    History of  prostate cancer  -     Prostate Specific Antigen, Diagnostic; Future; Expected date: 01/04/2026            Wellness reviewed          Visit today included increased complexity associated with the care of the episodic problem HLD addressed and managing the longitudinal care of the patient due to the serious and/or complex managed problem(s) HLD.  Continue current management and monitor.  Other diagnoses were reviewed and found stable and will continue to monitor.  Counseled on regular exercise, maintenance of a healthy weight, balanced diet rich in fruits/vegetables and lean protein, and avoidance of unhealthy habits like smoking and excessive alcohol intake.   Also, counseled on importance of being compliant with medication, health appointments, diet and exercise.     Follow up in about 6 months (around 1/15/2026).      Medication List with Changes/Refills   New Medications    TADALAFIL (CIALIS) 20 MG TAB    Take 1 tablet (20 mg total) by mouth once daily.   Current Medications    ACETAMINOPHEN (TYLENOL) 325 MG TABLET    Take 1 tablet (325 mg total) by mouth every 6 (six) hours as needed for Pain.    ALLOPURINOL (ZYLOPRIM) 300 MG TABLET    Take 1 tablet (300 mg total) by mouth once daily.    AMLODIPINE (NORVASC) 5 MG TABLET    TAKE 1 TABLET(5 MG) BY MOUTH EVERY EVENING    ASPIRIN (ECOTRIN) 81 MG EC TABLET    Take 81 mg by mouth every evening. HOLDING UNTIL INST BY MD    CLOPIDOGREL (PLAVIX) 75 MG TABLET    TAKE 1 TABLET(75 MG) BY MOUTH DAILY    COLCHICINE (COLCRYS) 0.6 MG TABLET    TAKE 1 TABLET NOW AND 1 AT BEDTIME, THEN 1 DAILY    EZETIMIBE (ZETIA) 10 MG TABLET    TAKE 1 TABLET(10 MG) BY MOUTH DAILY    IMIQUIMOD (ALDARA) 5 % CREAM    AAA nightly M-F for six weeks as directed.    NITROGLYCERIN (NITROSTAT) 0.4 MG SL TABLET    Place 1 tablet (0.4 mg total) under the tongue every 5 (five) minutes as needed for Chest pain.    PRAVASTATIN (PRAVACHOL) 20 MG TABLET    Take 1 tablet (20 mg total) by mouth every evening.     VALSARTAN-HYDROCHLOROTHIAZIDE (DIOVAN-HCT) 320-12.5 MG PER TABLET    Take 1 tablet by mouth once daily.       BP Readings from Last 3 Encounters:   07/08/25 132/74   04/21/25 130/72   04/07/25 (!) 155/80     Hemoglobin A1C   Date Value Ref Range Status   10/19/2011 5.5 4.0 - 6.2 % Final     Hemoglobin A1c   Date Value Ref Range Status   07/01/2025 5.6 4.0 - 5.6 % Final     Comment:     ADA Screening Guidelines:  5.7-6.4%  Consistent with prediabetes  >=6.5%  Consistent with diabetes    High levels of fetal hemoglobin interfere with the HbA1C  assay. Heterozygous hemoglobin variants (HbS, HgC, etc)do  not significantly interfere with this assay.   However, presence of multiple variants may affect accuracy.     Lab Results   Component Value Date    TSH 2.556 02/02/2023     Lab Results   Component Value Date    LDLCALC 60.6 (L) 07/01/2025    LDLCALC 83.6 12/24/2024    LDLCALC 63.4 06/27/2024     Lab Results   Component Value Date    TRIG 182 (H) 07/01/2025    TRIG 127 12/24/2024    TRIG 228 (H) 06/27/2024     Wt Readings from Last 3 Encounters:   07/08/25 92 kg (202 lb 13.2 oz)   04/22/25 94.2 kg (207 lb 10.8 oz)   04/21/25 94.2 kg (207 lb 10.8 oz)     Lab Results   Component Value Date    HGB 13.9 (L) 07/01/2025    HCT 41.3 07/01/2025    WBC 6.12 07/01/2025    ALT 23 07/01/2025    AST 22 07/01/2025     07/01/2025    K 3.9 07/01/2025    CREATININE 1.6 (H) 07/01/2025    PSA <0.01 07/01/2025           Review of Systems   Constitutional:  Negative for diaphoresis and fever.   HENT:  Negative for drooling and nosebleeds.    Eyes:  Negative for discharge and redness.   Respiratory:  Negative for apnea and choking.    Cardiovascular:  Negative for chest pain and palpitations.   Gastrointestinal:  Negative for abdominal pain and nausea.   Skin:  Negative for color change.   Neurological:  Negative for seizures and syncope.   Psychiatric/Behavioral:  Negative for behavioral problems.            Objective:      Vitals:     07/08/25 0841   BP: 132/74   Pulse: (!) 51     Physical Exam  Vitals reviewed.   Eyes:      Conjunctiva/sclera: Conjunctivae normal.   Neck:      Thyroid: No thyromegaly.      Trachea: Trachea normal.   Cardiovascular:      Rate and Rhythm: Normal rate and regular rhythm.      Comments: Edema negative  Pulmonary:      Effort: Pulmonary effort is normal.      Breath sounds: Normal breath sounds.   Abdominal:      General: Bowel sounds are normal.      Palpations: Abdomen is soft. There is no hepatomegaly.   Musculoskeletal:      Cervical back: Normal range of motion.      Comments: ROM normal bilateral  Strength normal bilateral   Skin:     General: Skin is warm and dry.   Neurological:      Deep Tendon Reflexes: Reflexes are normal and symmetric.   Psychiatric:      Comments: Alert and Oriented

## 2025-08-13 ENCOUNTER — OFFICE VISIT (OUTPATIENT)
Dept: ORTHOPEDICS | Facility: CLINIC | Age: 75
End: 2025-08-13
Payer: MEDICARE

## 2025-08-13 VITALS — WEIGHT: 201.94 LBS | HEIGHT: 70 IN | BODY MASS INDEX: 28.91 KG/M2

## 2025-08-13 DIAGNOSIS — M17.10 ARTHRITIS OF KNEE: Primary | ICD-10-CM

## 2025-08-13 DIAGNOSIS — M25.461 KNEE EFFUSION, RIGHT: ICD-10-CM

## 2025-08-13 PROCEDURE — 99214 OFFICE O/P EST MOD 30 MIN: CPT | Mod: 25,HCNC,S$GLB, | Performed by: ORTHOPAEDIC SURGERY

## 2025-08-13 PROCEDURE — 1125F AMNT PAIN NOTED PAIN PRSNT: CPT | Mod: CPTII,HCNC,S$GLB, | Performed by: ORTHOPAEDIC SURGERY

## 2025-08-13 PROCEDURE — 99999 PR PBB SHADOW E&M-EST. PATIENT-LVL II: CPT | Mod: PBBFAC,HCNC,, | Performed by: ORTHOPAEDIC SURGERY

## 2025-08-13 PROCEDURE — 20610 DRAIN/INJ JOINT/BURSA W/O US: CPT | Mod: HCNC,RT,S$GLB, | Performed by: ORTHOPAEDIC SURGERY

## 2025-08-13 PROCEDURE — 1160F RVW MEDS BY RX/DR IN RCRD: CPT | Mod: CPTII,HCNC,S$GLB, | Performed by: ORTHOPAEDIC SURGERY

## 2025-08-13 PROCEDURE — 1159F MED LIST DOCD IN RCRD: CPT | Mod: CPTII,HCNC,S$GLB, | Performed by: ORTHOPAEDIC SURGERY

## 2025-08-13 RX ORDER — TRIAMCINOLONE ACETONIDE 40 MG/ML
40 INJECTION, SUSPENSION INTRA-ARTICULAR; INTRAMUSCULAR
Status: DISCONTINUED | OUTPATIENT
Start: 2025-08-13 | End: 2025-08-13 | Stop reason: HOSPADM

## 2025-08-13 RX ADMIN — TRIAMCINOLONE ACETONIDE 40 MG: 40 INJECTION, SUSPENSION INTRA-ARTICULAR; INTRAMUSCULAR at 10:08

## (undated) DEVICE — CUP MEDICINE STERILE 2OZ

## (undated) DEVICE — SCRUB 10% POVIDONE IODINE 4OZ

## (undated) DEVICE — SEE MEDLINE ITEM 146362

## (undated) DEVICE — NEEDLE HYPO 18X1.5 SG

## (undated) DEVICE — GLOVE BIOGEL ECLIPSE SZ 7.5

## (undated) DEVICE — LUBRICANT SURGILUBE 2 OZ

## (undated) DEVICE — SYR 10CC LUER LOCK

## (undated) DEVICE — PAD PREP 50/CA

## (undated) DEVICE — NDL TISSUE BIOPSY MAGNUM

## (undated) DEVICE — MARKER SKIN STND TIP BLUE BARR

## (undated) DEVICE — COVER TRANSDUCER LATEX N/STERI

## (undated) DEVICE — SYR 50CC LL

## (undated) DEVICE — SOL IRR STRL WATER 500ML

## (undated) DEVICE — SEE MEDLINE ITEM 152474

## (undated) DEVICE — GAUZE SPONGE 4'X4' 8PLY NS

## (undated) DEVICE — FORMALIN 60ML PREFILLED CONT

## (undated) DEVICE — SEE MEDLINE ITEM 157128

## (undated) DEVICE — NDL SPINAL 22GX7 SPINOCAN